# Patient Record
Sex: FEMALE | Race: WHITE | Employment: OTHER | URBAN - METROPOLITAN AREA
[De-identification: names, ages, dates, MRNs, and addresses within clinical notes are randomized per-mention and may not be internally consistent; named-entity substitution may affect disease eponyms.]

---

## 2020-08-26 ENCOUNTER — OFFICE VISIT (OUTPATIENT)
Dept: FAMILY MEDICINE CLINIC | Age: 25
End: 2020-08-26
Payer: COMMERCIAL

## 2020-08-26 VITALS
SYSTOLIC BLOOD PRESSURE: 104 MMHG | HEART RATE: 66 BPM | BODY MASS INDEX: 22.3 KG/M2 | DIASTOLIC BLOOD PRESSURE: 72 MMHG | HEIGHT: 64 IN | OXYGEN SATURATION: 99 % | WEIGHT: 130.6 LBS | TEMPERATURE: 98.3 F

## 2020-08-26 PROBLEM — F32.A ANXIETY AND DEPRESSION: Status: ACTIVE | Noted: 2020-08-26

## 2020-08-26 PROBLEM — N92.6 IRREGULAR MENSES: Status: ACTIVE | Noted: 2020-08-26

## 2020-08-26 PROBLEM — F41.9 ANXIETY AND DEPRESSION: Status: ACTIVE | Noted: 2020-08-26

## 2020-08-26 PROCEDURE — 99395 PREV VISIT EST AGE 18-39: CPT | Performed by: FAMILY MEDICINE

## 2020-08-26 PROCEDURE — 96160 PT-FOCUSED HLTH RISK ASSMT: CPT | Performed by: FAMILY MEDICINE

## 2020-08-26 RX ORDER — ESCITALOPRAM OXALATE 10 MG/1
10 TABLET ORAL DAILY
Qty: 30 TABLET | Refills: 5 | Status: SHIPPED | OUTPATIENT
Start: 2020-08-26 | End: 2021-01-14

## 2020-08-26 RX ORDER — ESCITALOPRAM OXALATE 5 MG/1
TABLET ORAL
COMMUNITY
Start: 2020-08-05 | End: 2020-08-26 | Stop reason: DRUGHIGH

## 2020-08-26 SDOH — SOCIAL STABILITY: SOCIAL NETWORK: HOW OFTEN DO YOU GET TOGETHER WITH FRIENDS OR RELATIVES?: TWICE A WEEK

## 2020-08-26 SDOH — SOCIAL STABILITY: SOCIAL NETWORK: HOW OFTEN DO YOU ATTEND CHURCH OR RELIGIOUS SERVICES?: NEVER

## 2020-08-26 SDOH — SOCIAL STABILITY: SOCIAL NETWORK: IN A TYPICAL WEEK, HOW MANY TIMES DO YOU TALK ON THE PHONE WITH FAMILY, FRIENDS, OR NEIGHBORS?: TWICE A WEEK

## 2020-08-26 SDOH — SOCIAL STABILITY: SOCIAL INSECURITY: WITHIN THE LAST YEAR, HAVE YOU BEEN AFRAID OF YOUR PARTNER OR EX-PARTNER?: NO

## 2020-08-26 SDOH — SOCIAL STABILITY: SOCIAL INSECURITY
WITHIN THE LAST YEAR, HAVE TO BEEN RAPED OR FORCED TO HAVE ANY KIND OF SEXUAL ACTIVITY BY YOUR PARTNER OR EX-PARTNER?: NO

## 2020-08-26 SDOH — SOCIAL STABILITY: SOCIAL INSECURITY: WITHIN THE LAST YEAR, HAVE YOU BEEN HUMILIATED OR EMOTIONALLY ABUSED IN OTHER WAYS BY YOUR PARTNER OR EX-PARTNER?: NO

## 2020-08-26 SDOH — HEALTH STABILITY: PHYSICAL HEALTH: ON AVERAGE, HOW MANY MINUTES DO YOU ENGAGE IN EXERCISE AT THIS LEVEL?: 60 MIN

## 2020-08-26 SDOH — SOCIAL STABILITY: SOCIAL NETWORK: HOW OFTEN DO YOU ATTENT MEETINGS OF THE CLUB OR ORGANIZATION YOU BELONG TO?: NEVER

## 2020-08-26 SDOH — SOCIAL STABILITY: SOCIAL NETWORK: ARE YOU MARRIED, WIDOWED, DIVORCED, SEPARATED, NEVER MARRIED, OR LIVING WITH A PARTNER?: MARRIED

## 2020-08-26 SDOH — HEALTH STABILITY: PHYSICAL HEALTH: ON AVERAGE, HOW MANY DAYS PER WEEK DO YOU ENGAGE IN MODERATE TO STRENUOUS EXERCISE (LIKE A BRISK WALK)?: 7 DAYS

## 2020-08-26 SDOH — SOCIAL STABILITY: SOCIAL INSECURITY
WITHIN THE LAST YEAR, HAVE YOU BEEN KICKED, HIT, SLAPPED, OR OTHERWISE PHYSICALLY HURT BY YOUR PARTNER OR EX-PARTNER?: NO

## 2020-08-26 SDOH — HEALTH STABILITY: MENTAL HEALTH
STRESS IS WHEN SOMEONE FEELS TENSE, NERVOUS, ANXIOUS, OR CAN'T SLEEP AT NIGHT BECAUSE THEIR MIND IS TROUBLED. HOW STRESSED ARE YOU?: RATHER MUCH

## 2020-08-26 SDOH — SOCIAL STABILITY: SOCIAL NETWORK
DO YOU BELONG TO ANY CLUBS OR ORGANIZATIONS SUCH AS CHURCH GROUPS UNIONS, FRATERNAL OR ATHLETIC GROUPS, OR SCHOOL GROUPS?: NO

## 2020-08-26 ASSESSMENT — PATIENT HEALTH QUESTIONNAIRE - PHQ9
4. FEELING TIRED OR HAVING LITTLE ENERGY: 2
2. FEELING DOWN, DEPRESSED OR HOPELESS: 2
3. TROUBLE FALLING OR STAYING ASLEEP: 2
9. THOUGHTS THAT YOU WOULD BE BETTER OFF DEAD, OR OF HURTING YOURSELF: 0
5. POOR APPETITE OR OVEREATING: 0
7. TROUBLE CONCENTRATING ON THINGS, SUCH AS READING THE NEWSPAPER OR WATCHING TELEVISION: 0
SUM OF ALL RESPONSES TO PHQ QUESTIONS 1-9: 8
1. LITTLE INTEREST OR PLEASURE IN DOING THINGS: 1
SUM OF ALL RESPONSES TO PHQ9 QUESTIONS 1 & 2: 3
6. FEELING BAD ABOUT YOURSELF - OR THAT YOU ARE A FAILURE OR HAVE LET YOURSELF OR YOUR FAMILY DOWN: 1
10. IF YOU CHECKED OFF ANY PROBLEMS, HOW DIFFICULT HAVE THESE PROBLEMS MADE IT FOR YOU TO DO YOUR WORK, TAKE CARE OF THINGS AT HOME, OR GET ALONG WITH OTHER PEOPLE: 2
SUM OF ALL RESPONSES TO PHQ QUESTIONS 1-9: 8

## 2020-08-26 ASSESSMENT — ANXIETY QUESTIONNAIRES
6. BECOMING EASILY ANNOYED OR IRRITABLE: 1-SEVERAL DAYS
2. NOT BEING ABLE TO STOP OR CONTROL WORRYING: 1-SEVERAL DAYS
7. FEELING AFRAID AS IF SOMETHING AWFUL MIGHT HAPPEN: 0-NOT AT ALL
1. FEELING NERVOUS, ANXIOUS, OR ON EDGE: 1-SEVERAL DAYS
3. WORRYING TOO MUCH ABOUT DIFFERENT THINGS: 1-SEVERAL DAYS
5. BEING SO RESTLESS THAT IT IS HARD TO SIT STILL: 0-NOT AT ALL
GAD7 TOTAL SCORE: 4
4. TROUBLE RELAXING: 0-NOT AT ALL

## 2020-08-26 ASSESSMENT — ENCOUNTER SYMPTOMS
EYE DISCHARGE: 0
EYE PAIN: 0
SORE THROAT: 0
CONSTIPATION: 0
DIARRHEA: 0
WHEEZING: 0
SHORTNESS OF BREATH: 0
BLOOD IN STOOL: 0
RHINORRHEA: 0
BACK PAIN: 1
ABDOMINAL PAIN: 0
COLOR CHANGE: 0

## 2020-08-26 NOTE — PATIENT INSTRUCTIONS
-increase lexapro to 10 mg daily  -see OBGYN in mean time  -see behavioral health at your own discretion and my recommendation  -remain active and recommend 150 minutes a week of physical activity, at least 1500 to 2000 calories a day higher fat/protein, lower carbohydrate    See me back in 6 weeks

## 2020-08-26 NOTE — PROGRESS NOTES
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 No Known Allergies    Social History     Socioeconomic History    Marital status:      Spouse name: Not on file    Number of children: Not on file    Years of education: Not on file    Highest education level: Not on file   Occupational History    Not on file   Social Needs    Financial resource strain: Not on file    Food insecurity     Worry: Not on file     Inability: Not on file    Transportation needs     Medical: Not on file     Non-medical: Not on file   Tobacco Use    Smoking status: Never Smoker    Smokeless tobacco: Never Used   Substance and Sexual Activity    Alcohol use:  Yes     Alcohol/week: 6.0 standard drinks     Types: 3 Glasses of wine, 3 Cans of beer per week    Drug use: Never    Sexual activity: Yes     Partners: Male   Lifestyle    Physical activity     Days per week: 7 days     Minutes per session: 60 min    Stress: Rather much Relationships    Social connections     Talks on phone: Twice a week     Gets together: Twice a week     Attends Adventism service: Never     Active member of club or organization: No     Attends meetings of clubs or organizations: Never     Relationship status:     Intimate partner violence     Fear of current or ex partner: No     Emotionally abused: No     Physically abused: No     Forced sexual activity: No   Other Topics Concern    Not on file   Social History Narrative    Not on file       There is no immunization history on file for this patient. Past medical, surgical, and social history reviewed and updated. Medications, immunizations, and allergies reviewed and updated     Review of Systems   Constitutional: Negative for chills, fever and unexpected weight change. HENT: Negative for congestion, rhinorrhea and sore throat. Eyes: Negative for pain, discharge and visual disturbance. Respiratory: Negative for shortness of breath and wheezing. Cardiovascular: Negative for chest pain and leg swelling. Gastrointestinal: Negative for abdominal pain, blood in stool, constipation and diarrhea. Endocrine: Negative for polyuria. Genitourinary: Negative for difficulty urinating, dysuria, flank pain, menstrual problem, pelvic pain, vaginal discharge and vaginal pain. Musculoskeletal: Positive for back pain. Negative for arthralgias and neck pain. Skin: Negative for color change, rash and wound. Allergic/Immunologic: Negative for environmental allergies, food allergies and immunocompromised state. Neurological: Positive for headaches. Negative for dizziness, speech difficulty, weakness and light-headedness. Hematological: Negative for adenopathy. Does not bruise/bleed easily. Psychiatric/Behavioral: Positive for dysphoric mood and sleep disturbance (trouble falling asleep; needs TV to fall asleep). Negative for suicidal ideas. The patient is nervous/anxious. OBJECTIVE:  /72 (Site: Right Upper Arm, Position: Sitting)   Pulse 66   Temp 98.3 °F (36.8 °C)   Ht 5' 3.9\" (1.623 m)   Wt 130 lb 9.6 oz (59.2 kg)   SpO2 99%   BMI 22.49 kg/m²     Physical Exam  Constitutional:       General: She is not in acute distress. Appearance: She is well-developed. HENT:      Head: Normocephalic and atraumatic. Right Ear: Tympanic membrane normal.      Left Ear: Tympanic membrane normal.      Nose: Nose normal. No rhinorrhea. Mouth/Throat:      Pharynx: Uvula midline. Eyes:      Pupils: Pupils are equal, round, and reactive to light. Neck:      Trachea: No tracheal deviation. Cardiovascular:      Rate and Rhythm: Normal rate and regular rhythm. Heart sounds: Normal heart sounds. No murmur. No friction rub. No gallop. Pulmonary:      Effort: Pulmonary effort is normal. No respiratory distress. Breath sounds: Normal breath sounds. No wheezing or rales. Abdominal:      General: Bowel sounds are normal. There is no distension. Palpations: Abdomen is soft. Tenderness: There is no abdominal tenderness. There is no rebound. Musculoskeletal: Normal range of motion. General: No tenderness. Lymphadenopathy:      Cervical: No cervical adenopathy. Skin:     General: Skin is warm and dry. Findings: No erythema or rash. Comments: Dry skin/chapping above upper lip   Neurological:      Mental Status: She is alert and oriented to person, place, and time. Cranial Nerves: No cranial nerve deficit. Deep Tendon Reflexes:      Reflex Scores:       Tricep reflexes are 2+ on the right side and 2+ on the left side. Bicep reflexes are 2+ on the right side and 2+ on the left side. Brachioradialis reflexes are 2+ on the right side and 2+ on the left side. Patellar reflexes are 2+ on the right side and 2+ on the left side. Psychiatric:         Mood and Affect: Mood is anxious and depressed.  Affect is tearful. Speech: Speech normal.         Behavior: Behavior normal.         Thought Content: Thought content does not include homicidal or suicidal ideation. Judgment: Judgment normal.       ASSESSMENT/PLAN:  Vera Cabot is 32 y/o female here for establish care/annual physical.  Had chapped upper lips, anxiety/depression, concerns about fertility, need for preconception counseling/fertility work up. Referred to behavioral health for mood, increased lexapro to 10 mg daily. Referred to OBGYN for womne's health. Hx of migraines, no Rx indicated currently for prophylaxis/ of headaches. Had HPV series, Tdap in college. Pap smear in past year or two in [place]. Will get records from previous PCP/OBGYN. Follow up in 6 weeks for depression and anxiety. 1. Encounter to establish care  2. Annual physical exam  -Chart/records reviewed, history and physical performed, health maintenance addressed and updated, presenting problems addressed. 3. Anxiety and depression  PHQ-9 Total Score: 8 (2020 11:43 AM)  Thoughts that you would be better off dead, or of hurting yourself in some way: 0 (2020 11:43 AM)    DIONI 7 SCORE 2020   DIONI-7 Total Score 4     - escitalopram (LEXAPRO) 10 MG tablet; Take 1 tablet by mouth daily  Dispense: 30 tablet; Refill: 5  - External Referral To Psychology    4. Pre-conception counseling  5. Cervical cancer screening  6. Irregular menses  - [facility], DO, Obstetrics, [place]      Reviewed treatment plan with patient. Patient verbalized understanding to treatment plan and questions were answered. Return in about 6 weeks (around 10/7/2020). Marilyn Karimi.      2020

## 2020-09-30 ENCOUNTER — OFFICE VISIT (OUTPATIENT)
Dept: ORTHOPEDIC SURGERY | Age: 25
End: 2020-09-30
Payer: COMMERCIAL

## 2020-09-30 ENCOUNTER — TELEPHONE (OUTPATIENT)
Dept: ORTHOPEDIC SURGERY | Age: 25
End: 2020-09-30

## 2020-09-30 VITALS — WEIGHT: 130.51 LBS | HEIGHT: 64 IN | BODY MASS INDEX: 22.28 KG/M2

## 2020-09-30 PROCEDURE — L4361 PNEUMA/VAC WALK BOOT PRE OTS: HCPCS | Performed by: ORTHOPAEDIC SURGERY

## 2020-09-30 PROCEDURE — 99203 OFFICE O/P NEW LOW 30 MIN: CPT | Performed by: ORTHOPAEDIC SURGERY

## 2020-09-30 NOTE — PROGRESS NOTES
deformity or injury. Range of motion is unremarkable. There is no gross instability. There are no rashes, ulcerations or lesions. Strength and tone are normal.    Radiology:     X-rays obtained and reviewed in office:  Views 3  Location right ankle  Impression shows no evidence of obvious fracture mortise is well reduced. Assessment : Right anterior lateral ankle pain ATFL injury    Impression:  No diagnosis found. Office Procedures:  No orders of the defined types were placed in this encounter. Treatment Plan:  The etiology of anterior lateral ankle sprain and it's appropriate treatment were discussed in great detail. I wrote out her plan of care and copied to the media section of her chart.   I put her in a high tide boot Ace bandage she can wean off the crutches over the next 2 weeks follow-up with me in 2 weeks repeat x-rays of the ankle and hopefully at that point I can get her into a brace

## 2020-10-07 ENCOUNTER — OFFICE VISIT (OUTPATIENT)
Dept: FAMILY MEDICINE CLINIC | Age: 25
End: 2020-10-07
Payer: COMMERCIAL

## 2020-10-07 VITALS
WEIGHT: 134.2 LBS | SYSTOLIC BLOOD PRESSURE: 98 MMHG | BODY MASS INDEX: 22.91 KG/M2 | TEMPERATURE: 98.3 F | OXYGEN SATURATION: 98 % | HEART RATE: 73 BPM | DIASTOLIC BLOOD PRESSURE: 62 MMHG | HEIGHT: 64 IN

## 2020-10-07 PROCEDURE — 99213 OFFICE O/P EST LOW 20 MIN: CPT | Performed by: FAMILY MEDICINE

## 2020-10-07 RX ORDER — ESCITALOPRAM OXALATE 10 MG/1
10 TABLET ORAL DAILY
COMMUNITY
Start: 2020-08-26 | End: 2020-10-07 | Stop reason: SDUPTHER

## 2020-10-07 SDOH — ECONOMIC STABILITY: FOOD INSECURITY: WITHIN THE PAST 12 MONTHS, YOU WORRIED THAT YOUR FOOD WOULD RUN OUT BEFORE YOU GOT MONEY TO BUY MORE.: NEVER TRUE

## 2020-10-07 SDOH — ECONOMIC STABILITY: TRANSPORTATION INSECURITY
IN THE PAST 12 MONTHS, HAS THE LACK OF TRANSPORTATION KEPT YOU FROM MEDICAL APPOINTMENTS OR FROM GETTING MEDICATIONS?: NO

## 2020-10-07 SDOH — ECONOMIC STABILITY: INCOME INSECURITY: HOW HARD IS IT FOR YOU TO PAY FOR THE VERY BASICS LIKE FOOD, HOUSING, MEDICAL CARE, AND HEATING?: NOT HARD AT ALL

## 2020-10-07 SDOH — ECONOMIC STABILITY: TRANSPORTATION INSECURITY
IN THE PAST 12 MONTHS, HAS LACK OF TRANSPORTATION KEPT YOU FROM MEETINGS, WORK, OR FROM GETTING THINGS NEEDED FOR DAILY LIVING?: NO

## 2020-10-07 SDOH — ECONOMIC STABILITY: FOOD INSECURITY: WITHIN THE PAST 12 MONTHS, THE FOOD YOU BOUGHT JUST DIDN'T LAST AND YOU DIDN'T HAVE MONEY TO GET MORE.: NEVER TRUE

## 2020-10-07 ASSESSMENT — PATIENT HEALTH QUESTIONNAIRE - PHQ9
SUM OF ALL RESPONSES TO PHQ QUESTIONS 1-9: 7
9. THOUGHTS THAT YOU WOULD BE BETTER OFF DEAD, OR OF HURTING YOURSELF: 0
6. FEELING BAD ABOUT YOURSELF - OR THAT YOU ARE A FAILURE OR HAVE LET YOURSELF OR YOUR FAMILY DOWN: 1
10. IF YOU CHECKED OFF ANY PROBLEMS, HOW DIFFICULT HAVE THESE PROBLEMS MADE IT FOR YOU TO DO YOUR WORK, TAKE CARE OF THINGS AT HOME, OR GET ALONG WITH OTHER PEOPLE: 0
4. FEELING TIRED OR HAVING LITTLE ENERGY: 1
SUM OF ALL RESPONSES TO PHQ9 QUESTIONS 1 & 2: 2
8. MOVING OR SPEAKING SO SLOWLY THAT OTHER PEOPLE COULD HAVE NOTICED. OR THE OPPOSITE, BEING SO FIGETY OR RESTLESS THAT YOU HAVE BEEN MOVING AROUND A LOT MORE THAN USUAL: 0
5. POOR APPETITE OR OVEREATING: 0
SUM OF ALL RESPONSES TO PHQ QUESTIONS 1-9: 7
2. FEELING DOWN, DEPRESSED OR HOPELESS: 1
1. LITTLE INTEREST OR PLEASURE IN DOING THINGS: 1
3. TROUBLE FALLING OR STAYING ASLEEP: 2
7. TROUBLE CONCENTRATING ON THINGS, SUCH AS READING THE NEWSPAPER OR WATCHING TELEVISION: 1

## 2020-10-07 ASSESSMENT — ANXIETY QUESTIONNAIRES
2. NOT BEING ABLE TO STOP OR CONTROL WORRYING: 0-NOT AT ALL
GAD7 TOTAL SCORE: 3
5. BEING SO RESTLESS THAT IT IS HARD TO SIT STILL: 0-NOT AT ALL
6. BECOMING EASILY ANNOYED OR IRRITABLE: 1-SEVERAL DAYS
1. FEELING NERVOUS, ANXIOUS, OR ON EDGE: 0-NOT AT ALL
4. TROUBLE RELAXING: 1-SEVERAL DAYS
7. FEELING AFRAID AS IF SOMETHING AWFUL MIGHT HAPPEN: 0-NOT AT ALL
3. WORRYING TOO MUCH ABOUT DIFFERENT THINGS: 1-SEVERAL DAYS

## 2020-10-07 ASSESSMENT — ENCOUNTER SYMPTOMS
SORE THROAT: 0
BLOOD IN STOOL: 0
ABDOMINAL PAIN: 0
DIARRHEA: 0
SHORTNESS OF BREATH: 0
TROUBLE SWALLOWING: 0
CONSTIPATION: 0
RHINORRHEA: 0

## 2020-10-07 NOTE — PROGRESS NOTES
SUBJECTIVE:  Chief Complaint   Patient presents with    Anxiety     pt states that the increase dose of her Lexapro has helped some.  Depression    Ear Fullness     pt states that she was seen in an urgent care for an ear infection, finished antibiotics but still has muffled hearing.  Ankle Injury    Ankle Pain     HPI     Lamonte Barrett is a 31 y. o.female that presents today for 6 week follow up for mood, ankle sprain, ear pain/hearing change:    -Stress/anxiety/depression:  -hast not sought out but will consider behavioral health  -increased lexapro from 5 to 10 mg daily last OV  -still having times of tearfulness or feels like doesn't want to talk  -overall feeling some better per patient  PHQ-9 Total Score: 7 (10/7/2020  8:49 AM)  Thoughts that you would be better off dead, or of hurting yourself in some way: 0 (10/7/2020  8:49 AM)    DIONI 7 SCORE 10/7/2020 8/26/2020   DIONI-7 Total Score 3 4     -Right ankle sprain:  Sprained right ankle 9/24/2020  Saw Dr. Sejal Maurer on 9/30/2020  Aircast boot for a weeks  Still very bruised, not nearly as swollen  Pain with getting in and out of car and on stairs  Okay as long as she is in the boot.    -Hearing change/right ear pain:  About 4 weeks ago had right ear pain  Got worse at first, mild pain, discharge and hearing loss  Treated for otitis externa  With cortisporin drops and Augmentin pills  No longer in pain but having muffled hearing and some fullness    Past Medical History:   Diagnosis Date    Anxiety     Depression     Headache        Past Surgical History:   Procedure Laterality Date    WISDOM TOOTH EXTRACTION         Family History   Problem Relation Age of Onset    No Known Problems Mother     Kidney stones Father     Collagen Disease Father         diverticulitis       Current Outpatient Medications   Medication Sig Dispense Refill    escitalopram (LEXAPRO) 10 MG tablet Take 1 tablet by mouth daily 30 tablet 5     No current facility-administered medications for this visit. No Known Allergies    Social History     Socioeconomic History    Marital status:      Spouse name: Deirdre Every Number of children: Not on file    Years of education: Not on file    Highest education level: Not on file   Occupational History    Not on file   Social Needs    Financial resource strain: Not hard at all   [place] insecurity     Worry: Never true     Inability: Never true   [employer] needs     Medical: No     Non-medical: No   Tobacco Use    Smoking status: Never Smoker    Smokeless tobacco: Never Used   Substance and Sexual Activity    Alcohol use: Yes     Alcohol/week: 6.0 standard drinks     Types: 3 Glasses of wine, 3 Cans of beer per week    Drug use: Never    Sexual activity: Yes     Partners: Male   Lifestyle    Physical activity     Days per week: 7 days     Minutes per session: 60 min    Stress: Rather much   Relationships    Social connections     Talks on phone: Twice a week     Gets together: Twice a week     Attends Spiritism service: Never     Active member of club or organization: No     Attends meetings of clubs or organizations: Never     Relationship status:     Intimate partner violence     Fear of current or ex partner: No     Emotionally abused: No     Physically abused: No     Forced sexual activity: No   Other Topics Concern    Not on file   Social History Narrative    Not on file       There is no immunization history on file for this patient. Past medical, surgical, and social history reviewed and updated. Medications, immunizations, and allergies reviewed and updated     Review of Systems   Constitutional: Negative for chills, fever and unexpected weight change. HENT: Positive for hearing loss (right hearing change). Negative for congestion, ear discharge, ear pain, rhinorrhea, sore throat and trouble swallowing. Eyes: Negative for visual disturbance. Respiratory: Negative for shortness of breath. Cardiovascular: Negative for chest pain. Gastrointestinal: Negative for abdominal pain, blood in stool, constipation and diarrhea. Endocrine: Negative for polyuria. Genitourinary: Negative for dysuria and hematuria. Musculoskeletal: Positive for arthralgias and joint swelling (right ankle). Skin: Negative for rash. Allergic/Immunologic: Negative for environmental allergies and food allergies. Neurological: Negative for weakness, numbness and headaches. Psychiatric/Behavioral: Positive for dysphoric mood and sleep disturbance. The patient is nervous/anxious. OBJECTIVE:  BP 98/62   Pulse 73   Temp 98.3 °F (36.8 °C) (Temporal)   Ht 5' 3.9\" (1.623 m)   Wt 134 lb 3.2 oz (60.9 kg)   LMP 09/18/2020   SpO2 98%   BMI 23.11 kg/m²     Physical Exam  Constitutional:       General: She is not in acute distress. Appearance: She is well-developed. HENT:      Head: Normocephalic and atraumatic. Right Ear: A middle ear effusion is present. There is impacted cerumen. Tympanic membrane is erythematous. Left Ear: Tympanic membrane normal.      Ears:      [place]ona exam findings: lateralizes right. Right Rinne: AC > BC. Left Rinne: AC > BC. Nose: Nose normal. No rhinorrhea. Mouth/Throat:      Pharynx: Uvula midline. Eyes:      Pupils: Pupils are equal, round, and reactive to light. Neck:      Trachea: No tracheal deviation. Cardiovascular:      Rate and Rhythm: Normal rate and regular rhythm. Heart sounds: Normal heart sounds. No murmur. No friction rub. No gallop. Pulmonary:      Effort: Pulmonary effort is normal. No respiratory distress. Breath sounds: Normal breath sounds. No wheezing or rales. Abdominal:      General: Bowel sounds are normal. There is no distension. Palpations: Abdomen is soft. Tenderness: There is no abdominal tenderness. There is no rebound. Musculoskeletal: Normal range of motion. General: No tenderness. Lymphadenopathy:      Cervical: No cervical adenopathy. Skin:     General: Skin is warm and dry. Findings: No erythema or rash. Neurological:      Mental Status: She is alert and oriented to person, place, and time. Cranial Nerves: No cranial nerve deficit. Psychiatric:         Speech: Speech normal.         Thought Content: Thought content does not include homicidal or suicidal ideation. ASSESSMENT/PLAN:  Harris Rossi is 79-year-old female seen today for anxiety and depression follow-up, right ear pain and hearing change, and right ankle sprain. Mood somewhat improved but still about the same on Lexapro 10 mg which we increased from 5 mg 6 weeks ago. Patient is planning to reach out  with behavioral health in the future but has not sought out care yet. Right ankle sprain in the last few weeks and followed up with orthopedics and has a follow-up in 1 week. Currently in a right sided pneumo boot/Aircast.  Patient also with otitis externa and treated with oral and topical antibiotics at an urgent care, still having some muffling of the right ear, some tenderness to palpation when looking in the ear today and inner ear redness and cerumen. Encourage patient to do an additional 7 days of Cortisporin drops and if no improvement we will refer to ENT for more formal testing. Barahona test lateralized to the affected ear today. 1. Anxiety and depression  PHQ-9 Total Score: 7 (10/7/2020  8:49 AM)  Thoughts that you would be better off dead, or of hurting yourself in some way: 0 (10/7/2020  8:49 AM)    DIONI 7 SCORE 10/7/2020 8/26/2020   DIONI-7 Total Score 3 4     -will consider seeing behavioral health  -continue lexapro 10 mg daily    2. Other infective acute otitis externa of right ear  -restart cortisporin drops for 7 more days  -refer for audiometry/ENT assessment if persists/fails to improve    3.  Sprain of right ankle, unspecified ligament, sequela  -continue air cast, follow up with  [facility] as planned in 1 week    Reviewed treatment plan with patient. Patient verbalized understanding to treatment plan and questions were answered. Return if symptoms worsen or fail to improve. Paris Ramires.      10/7/2020

## 2020-10-07 NOTE — PATIENT INSTRUCTIONS
Use the cortisporin drops for an additional 7 days  Let me know if not improving or still muffled and will have you see the ENT  Continue Lexapro at 10 mg daily  See behavioral health  Follow up with Dr. Courtney Kaur  See gynecology/fertility at your convenience

## 2020-10-21 NOTE — PROGRESS NOTES
Subjective: Patient is here for follow-up of right ankle pain. She states that she has been out of the boot a little bit and her pain is gone down significantly since last time I saw her on 9/30/2020. 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 She has been using her boot on a regular basis and is significantly better  Objective: Physical exam shows minimal effusion in the right ankle. Anterior drawer and talar tilt show no gross laxity sensations intact 10 degrees dorsiflexion 50 degrees of plantarflexion anterior drawer and talar tilt show minimal laxity patient is tender over the ATFL not over the peroneals  Imaging: 3 views of the right ankle show some lucency on the AP and the either anterior lateral or posterior lateral tibia. Otherwise mortise is well reduced  Assessment and plan: Patient is doing significantly better. We talked about using a brace versus a work boot she will gradually increase her activities follow-up with me in 4 weeks as needed.   If she does follow-up repeat x-rays of the ankle should be obtained

## 2020-10-22 ENCOUNTER — OFFICE VISIT (OUTPATIENT)
Dept: ORTHOPEDIC SURGERY | Age: 25
End: 2020-10-22
Payer: COMMERCIAL

## 2020-10-22 VITALS — BODY MASS INDEX: 23.74 KG/M2 | WEIGHT: 134 LBS | HEIGHT: 63 IN

## 2020-10-22 PROCEDURE — 99214 OFFICE O/P EST MOD 30 MIN: CPT | Performed by: ORTHOPAEDIC SURGERY

## 2021-01-12 ENCOUNTER — OFFICE VISIT (OUTPATIENT)
Dept: PRIMARY CARE CLINIC | Age: 26
End: 2021-01-12
Payer: COMMERCIAL

## 2021-01-12 VITALS
HEART RATE: 84 BPM | HEIGHT: 63 IN | WEIGHT: 137.8 LBS | BODY MASS INDEX: 24.41 KG/M2 | DIASTOLIC BLOOD PRESSURE: 84 MMHG | OXYGEN SATURATION: 98 % | SYSTOLIC BLOOD PRESSURE: 118 MMHG | TEMPERATURE: 97.1 F

## 2021-01-12 DIAGNOSIS — F41.9 ANXIETY AND DEPRESSION: Primary | ICD-10-CM

## 2021-01-12 DIAGNOSIS — F32.A ANXIETY AND DEPRESSION: Primary | ICD-10-CM

## 2021-01-12 PROCEDURE — 99203 OFFICE O/P NEW LOW 30 MIN: CPT | Performed by: FAMILY MEDICINE

## 2021-01-12 ASSESSMENT — ENCOUNTER SYMPTOMS
CHEST TIGHTNESS: 0
SINUS PRESSURE: 0
SHORTNESS OF BREATH: 0
VOMITING: 0
SORE THROAT: 0
COUGH: 0
NAUSEA: 0
BLOOD IN STOOL: 0
SINUS PAIN: 0
ABDOMINAL PAIN: 0
WHEEZING: 0
DIARRHEA: 0

## 2021-01-12 NOTE — PROGRESS NOTES
Subjective  Nely Tavares, 31 y.o. female presents today with:  Chief Complaint   Patient presents with    Medication Check         Past Medical History:   Diagnosis Date    Anxiety     Depression     Headache      Past Surgical History:   Procedure Laterality Date    WISDOM TOOTH EXTRACTION       Social History     Socioeconomic History    Marital status:      Spouse name: Richard Elliott Number of children: Not on file    Years of education: Not on file    Highest education level: Not on file   Occupational History    Not on file   Social Needs    Financial resource strain: Not hard at all   [employer] insecurity     Worry: Never true     Inability: Never true   [employer] needs     Medical: No     Non-medical: No   Tobacco Use    Smoking status: Never Smoker    Smokeless tobacco: Never Used   Substance and Sexual Activity    Alcohol use:  Yes     Alcohol/week: 6.0 standard drinks     Types: 3 Glasses of wine, 3 Cans of beer per week    Drug use: Never    Sexual activity: Yes     Partners: Male   Lifestyle    Physical activity     Days per week: 7 days     Minutes per session: 60 min    Stress: Rather much   Relationships    Social connections     Talks on phone: Twice a week     Gets together: Twice a week     Attends Anglican service: Never     Active member of club or organization: No     Attends meetings of clubs or organizations: Never     Relationship status:     Intimate partner violence     Fear of current or ex partner: No     Emotionally abused: No     Physically abused: No     Forced sexual activity: No   Other Topics Concern    Not on file   Social History Narrative    Not on file     Family History   Problem Relation Age of Onset    No Known Problems Mother     Kidney stones Father     Collagen Disease Father         diverticulitis     No Known Allergies  Current Outpatient Medications   Medication Sig Dispense Refill    escitalopram (LEXAPRO) 10 MG tablet Take 1 tablet by mouth daily 30 tablet 5     No current facility-administered medications for this visit. PMH, Surgical Hx, Family Hx, and Social Hx reviewed and updated. Health Maintenance reviewed. Mood Disorders Screen:  Risk factors: none  Symptoms:  endorses none, denies {[id number]  No data recorded       Safety Assessment:  Functional ability/ADLs:  Difficulty with bathing- no, grooming- no, meals- no, incontinence- no.  Driving- no. Home safety: Lives with family . Number of stairs to enter home: 0, within home: 24.  Risk factors for falls: no.  Home environment modifications:  no.   Exercise: walking  Seatbelt use: yes      Cognitive Screening:  Clock drawing test score: 0/5. Mini-mental status exam score: not completed because not due yet. Last eye exam: na, normal    End of Life Planning:  Advanced Directive: has NO advanced directive - not interested in additional information. Objective      Reviewed with the patient: current clinical status,medications, activities and diet.

## 2021-01-12 NOTE — PATIENT INSTRUCTIONS
Patient Education        Depression Treatment: Care Instructions  Your Care Instructions     Depression is a condition that affects the way you feel, think, and act. It causes symptoms such as low energy, loss of interest in daily activities, and sadness or grouchiness that goes on for a long time. Depression is very common and affects men and women of all ages. Depression is a medical illness caused by changes in the natural chemicals in your brain. It is not a character flaw, and it does not mean that you are a bad or weak person. It does not mean that you are going crazy. It is important to know that depression can be treated. Medicines, counseling, and self-care can all help. Many people do not get help because they are embarrassed or think that they will get over the depression on their own. But some people do not get better without treatment. Follow-up care is a key part of your treatment and safety. Be sure to make and go to all appointments, and call your doctor if you are having problems. It's also a good idea to know your test results and keep a list of the medicines you take. How can you care for yourself at home? Learn about antidepressant medicines  Antidepressant medicines can improve or end the symptoms of depression. You may need to take the medicine for at least 6 months, and often longer. Keep taking your medicine even if you feel better. If you stop taking it too soon, your symptoms may come back or get worse. You may start to feel better within 1 to 3 weeks of taking antidepressant medicine. But it can take as many as 6 to 8 weeks to see more improvement. Talk to your doctor if you have problems with your medicine or if you do not notice any improvement after 3 weeks. Antidepressants can make you feel tired, dizzy, or nervous. Some people have dry mouth, constipation, headaches, sexual problems, an upset stomach, or diarrhea.  Many of these side effects are mild and go away on their own after you take the medicine for a few weeks. Some may last longer. Talk to your doctor if side effects bother you too much. You might be able to try a different medicine. If you are pregnant or breastfeeding, talk to your doctor about what medicines you can take. Learn about counseling  In many cases, counseling can work as well as medicines to treat mild to moderate depression. Counseling is done by licensed mental health providers, such as psychologists, social workers, and some types of nurses. It can be done in one-on-one sessions or in a group setting. Many people find group sessions helpful. Cognitive-behavioral therapy is a type of counseling. In this treatment therapy, you learn how to see and change unhelpful thinking styles that may be adding to your depression. Counseling and medicines often work well when used together. Here are other things you could try to help with depression:  · Get regular exercise. It may help you feel better. · Plan something pleasant for yourself every day. Include activities that you have enjoyed in the past.  · Get enough sleep. Talk to your doctor if you have problems sleeping. · Eat a balanced diet. If you do not feel hungry, eat small snacks rather than large meals. · Avoid using illegal drugs or marijuana and drinking alcohol. Do not take medicines that have not been prescribed for you. They may interfere with your treatment, or they may make your depression worse. · Spend time with family and friends. It may help to speak openly about your depression with people you trust.  · Take your medicines exactly as prescribed. Call your doctor if you think you are having a problem with your medicine. · Do not make major life decisions while you are depressed. Depression may change the way you think. You will be able to make better decisions after you feel better. · Think positively. Challenge negative thoughts with statements such as \"I am hopeful\";  \"Things will get better\"; and \"I can ask for the help I need. \" Write down these statements and read them often, even if you don't believe them yet. · Be patient with yourself. It took time for your depression to develop, and it will take time for your symptoms to improve. Do not take on too much or be too hard on yourself. · Learn all you can about depression from written and online materials. · Check out behavioral health classes to learn more about dealing with depression. · If you or someone you know talks about suicide, self-harm, or feeling hopeless, get help right away. Call the [address] [place] [address] at 3-436-617-CXZL ([phone]) or text HOME to 266705 to access the [employer] Text Line. Consider saving these numbers in your phone. When should you call for help? Call 911 anytime you think you may need emergency care. For example, call if:    · You feel you cannot stop from hurting yourself or someone else. Call your doctor now or seek immediate medical care if:    · You hear voices.     · You feel much more depressed. Watch closely for changes in your health, and be sure to contact your doctor if:    · You are having problems with your depression medicine.     · You are not getting better as expected. Where can you learn more? Go to [email or web address] org and sign in to your Ch[employer] account. Enter G909 in the [employer] box to learn more about \"Depression Treatment: Care Instructions. \"     If you do not have an account, please click on the \"Sign Up Now\" link. Current as of: January 31, 2020               Content Version: 12.6  © 1563-5239 [employer]. Care instructions adapted under license by [facility] [facility] If you have questions about a medical condition or this instruction, always ask your healthcare professional. [address] any warranty or liability for your use of this information.

## 2021-01-12 NOTE — PROGRESS NOTES
Richelle Xiong  1995    No Known Allergies  Current Outpatient Medications   Medication Sig Dispense Refill    escitalopram (LEXAPRO) 10 MG tablet Take 1 tablet by mouth daily 30 tablet 5     No current facility-administered medications for this visit. Consultants:   Patient Care Team:  Nhung Fontanez. Doneta Boxer., DO as PCP - General (Family Medicine)  Nhung Fontanez. Doneta Boxer., DO as PCP - [facility] Radha Schmidt Provider    Chief Complaint:     Richelle Xiong is a 31 y.o. female  who presents for New Patient with Personalized Prevention Plan Services. 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 Patient states that she has been on Lexapro 10 mg daily for many years, she explains that on [place] [employer] she decided to go ahead and stop the medication because she did not feel that it was helping. She states that she was having a hard time functioning on the medication felt like her depression was worse in her anxiety and understands that she did the wrong thing by just stopping the medication without consulting her medical physician but states that she was going through some withdrawal such as tingling randomly throughout her body and irritability. This has been over a month now and states that most of these symptoms have resolved. States that she is not wanting to go back on medication at this point. Denies any SI or HI. She is wondering what options are at this point. Patient denies any other acute complaints or concerns.         Patient Active Problem List   Diagnosis    Anxiety and depression    Irregular menses           Health Maintenance Completed:  Health Maintenance   Topic Date Due    Hepatitis C screen  1995    HPV vaccine (1 - 2-dose series) 04/19/2005    HIV screen 04/19/2009    DTaP/Tdap/Td vaccine (1 - Tdap) 04/19/2013    Cervical cancer screen  04/19/2015    Flu vaccine (1) 09/01/2020    Hepatitis A vaccine  Aged Out    Hepatitis B vaccine  Aged Out    Hib vaccine  Aged Out    Meningococcal (ACWY) vaccine  Aged Out    Pneumococcal 0-64 years Vaccine  Aged Out    Varicella vaccine  Discontinued            There is no immunization history on file for this patient. Review of Systems:    Review of Systems   Constitutional: Negative for chills, diaphoresis, fatigue, fever and unexpected weight change. HENT: Negative for congestion, ear pain, sinus pressure, sinus pain and sore throat. Eyes: Negative for visual disturbance. Respiratory: Negative for cough, chest tightness, shortness of breath and wheezing. Cardiovascular: Negative for chest pain and palpitations. Gastrointestinal: Negative for abdominal pain, blood in stool, diarrhea, nausea and vomiting. Endocrine: Negative for cold intolerance and heat intolerance. Genitourinary: Negative for dysuria and hematuria. Musculoskeletal: Negative for arthralgias, gait problem and myalgias. Skin: Negative for pallor and rash. Neurological: Negative for dizziness and headaches. Hematological: Negative for adenopathy. Does not bruise/bleed easily. Psychiatric/Behavioral: Positive for agitation. Negative for behavioral problems, confusion and suicidal ideas. The patient is not nervous/anxious. Physical Exam:     Vitals:    01/12/21 0831   BP: 118/84   Pulse: 84   Temp: 97.1 °F (36.2 °C)   TempSrc: Temporal   SpO2: 98%   Weight: 137 lb 12.8 oz (62.5 kg)   Height: 5' 3\" (1.6 m)     Body mass index is 24.41 kg/m². BP Readings from Last 3 Encounters:   01/12/21 118/84   10/07/20 98/62   08/26/20 104/72          Physical Exam  Constitutional:       Appearance: Normal appearance. HENT:      Head: Normocephalic and atraumatic.       Mouth/Throat:      Mouth: Mucous membranes are moist. Pharynx: Oropharynx is clear. No oropharyngeal exudate or posterior oropharyngeal erythema. Eyes:      Extraocular Movements: Extraocular movements intact. Pupils: Pupils are equal, round, and reactive to light. Neck:      Musculoskeletal: Normal range of motion and neck supple. Cardiovascular:      Rate and Rhythm: Normal rate and regular rhythm. Pulses: Normal pulses. Heart sounds: Normal heart sounds. Pulmonary:      Effort: Pulmonary effort is normal.      Breath sounds: Normal breath sounds. Abdominal:      General: Bowel sounds are normal.      Palpations: Abdomen is soft. Tenderness: There is no abdominal tenderness. There is no guarding. Musculoskeletal: Normal range of motion. General: No swelling. Skin:     General: Skin is warm and dry. Capillary Refill: Capillary refill takes less than 2 seconds. Neurological:      General: No focal deficit present. Mental Status: She is alert and oriented to person, place, and time. Psychiatric:         Mood and Affect: Mood normal.         Behavior: Behavior normal.            Assessment/Plan:  Odalys was seen today for medication check. 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 Longstanding history. Previously on Lexapro 10 mg daily times many years, but stopped on [place] [employer] 2020.   Counseled patient on the importance of acutely withdrawing SSRI therapy. Patient was apologetic and appreciative. Counseled on treatment options including medical management and CBT. Patient elects to proceed with CBT at this point. Referral completed for behavioral medicine, Dr. Jp Moser. If patient wishes to consider medical management we will have this discussion and I am happy to initiate therapy as indicated. Health Maintenance:  (F) Breast Cancer Screen:   (F) Cervical Cancer Screen: Followed by [facility]  CRC/Colonoscopy Screening:   Lung Ca Screening: Annual LDCT (+smoker age 49-80, smoked within 15 years, total of 20 pack yr history):  DEXA Screen:  HIV Screen: (16-65 yr old, and all pregnant patients): Hep C Screen: (18-79 yr old):  [facility] [address] Screen:  (all pts with cirrhosis and high risk Hep B (US q6 mo)):    Return in about 3 months (around 4/12/2021) for Annual and FU on Dep/Anxiety. 3 months   - for annual, does not need PAP, has completed with [address] on Dep/Anxiety, consider restarting meds (previously on Lexapro but didn't feel that worked  - FU on Dr. Eder [facility]      MA Note Attestation:  I have reviewed the chief complaint and history of present illness (including ROS and [address]) and vital documentation by my staff and I agree with their documentation and have added where applicable. EMR Dragon/transcription disclaimer:  Much of this encounter note is electronic transcription/translation of spoken language to printed texts. The electronic translation of spoken language may be erroneous, or at times, nonsensical words or phrases may be inadvertently transcribed.   Although I have reviewed the note for such errors, some may still exist.

## 2021-01-13 ENCOUNTER — OFFICE VISIT (OUTPATIENT)
Dept: PRIMARY CARE CLINIC | Age: 26
End: 2021-01-13
Payer: COMMERCIAL

## 2021-01-13 DIAGNOSIS — F41.1 GENERALIZED ANXIETY DISORDER: ICD-10-CM

## 2021-01-13 DIAGNOSIS — F33.2 SEVERE EPISODE OF RECURRENT MAJOR DEPRESSIVE DISORDER, WITHOUT PSYCHOTIC FEATURES (HCC): Primary | ICD-10-CM

## 2021-01-13 PROCEDURE — 90791 PSYCH DIAGNOSTIC EVALUATION: CPT | Performed by: COUNSELOR

## 2021-01-13 ASSESSMENT — PATIENT HEALTH QUESTIONNAIRE - PHQ9
5. POOR APPETITE OR OVEREATING: 3
SUM OF ALL RESPONSES TO PHQ QUESTIONS 1-9: 24
2. FEELING DOWN, DEPRESSED OR HOPELESS: 3
4. FEELING TIRED OR HAVING LITTLE ENERGY: 3
6. FEELING BAD ABOUT YOURSELF - OR THAT YOU ARE A FAILURE OR HAVE LET YOURSELF OR YOUR FAMILY DOWN: 3
3. TROUBLE FALLING OR STAYING ASLEEP: 3
9. THOUGHTS THAT YOU WOULD BE BETTER OFF DEAD, OR OF HURTING YOURSELF: 0

## 2021-01-13 ASSESSMENT — COLUMBIA-SUICIDE SEVERITY RATING SCALE - C-SSRS
2. HAVE YOU ACTUALLY HAD ANY THOUGHTS OF KILLING YOURSELF?: NO
6. HAVE YOU EVER DONE ANYTHING, STARTED TO DO ANYTHING, OR PREPARED TO DO ANYTHING TO END YOUR LIFE?: NO
1. WITHIN THE PAST MONTH, HAVE YOU WISHED YOU WERE DEAD OR WISHED YOU COULD GO TO SLEEP AND NOT WAKE UP?: NO

## 2021-01-14 DIAGNOSIS — F33.2 SEVERE EPISODE OF RECURRENT MAJOR DEPRESSIVE DISORDER, WITHOUT PSYCHOTIC FEATURES (HCC): Primary | ICD-10-CM

## 2021-01-14 RX ORDER — CITALOPRAM 20 MG/1
20 TABLET ORAL DAILY
Qty: 90 TABLET | Refills: 3 | Status: SHIPPED | OUTPATIENT
Start: 2021-01-14 | End: 2022-01-10

## 2021-01-14 NOTE — PROGRESS NOTES
[facility]    Time Start: 1:05pm    Time End:  2:15pm   Date of Service:  1/13/2021    Basis of Evaluation:   [x]  Clinical Interview  [x]  Review of Medical Record    [x] Patient Health Questionnaire (PHQ)  []  Interview family member    Presenting Concerns:  Luane Cushing is a 31 y.o. who was referred by her primary care physician, Saulo Hatch. Sheron Gonzalez DO, due to concerns about depression and anxiety. Odalys reported the following symptoms of depression : anhedonia, depressed mood, difficulty concentrating, fatigue, feelings of worthlessness/guilt and hopelessness. She also reported crying spells, loss of energy, sense of worthlessness, sense of helplessness, sense of hopelessness, irritability, too much sleep, sad mood, loss of interest and anxiousness. In addition, Odalys reported symptoms of excessive worry, fearfulness and depression. There is no evidence of mare or a thought disorder. She reported that her symptoms began \"all throughout childhood, but became noticeably worse later in college when classes became smaller. 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 \"    Previous behavioral health treatment history: None  has a current medication list which includes the following prescription(s): escitalopram.    PHQ Scores 1/13/2021 10/7/2020 8/26/2020   PHQ2 Score 6 2 3   PHQ9 Score 24 7 8     Interpretation of Total Score Depression Severity: 1-4 = Minimal depression, 5-9 = Mild depression, 10-14 = Moderate depression, 15-19 = Moderately severe depression, 20-27 = Severe depression    Odalys    Mental Status:  Appearance: age appropriate, casually dressed and poor hygiene   Affect:  consistent with expectations based upon mood and flat   Attitude: Cooperative, [place] and Friendly   Mood:   Dysphoric and Anxious Thought Process:  Flight of ideas, Disorganized and goal directed   Delusions: no evidence of delusions   Perceptions: Depersonalization   Behavior:   open, friendly, cooperative, restless, tearful and difficult to redirect   Psychomotor: Within normal limits   Speech: Within normal limits   Eye Contact: fair   Orientation:  oriented to person, place, time, and general circumstances   Judgment & Insight:  normal insight and judgment     Risk Assessment:  Current Suicide Risk:  no suicidal ideation, nonsuicidal morbid ideation  Current Homicide Risk:  no homocidal ideation    Odalys reported no previous history of suicidal ideation or behavior. Social History/Functioning:  Elza Jimenez is currently living in a home with her  Josefa Mora,  3yrs, together a total of 8yrs), numerous farm animals, and reported a good social support network including her sister and parents, though they live in [place], and describes her mother as having \"poor boundaries. \". She denied any current cultural or spiritual concerns. Social History     Socioeconomic History    Marital status:      Spouse name: Rubi Scott Number of children: Not on file    Years of education: Not on file    Highest education level: Not on file   Occupational History    Not on file   Social Needs    Financial resource strain: Not hard at all   [employer] insecurity     Worry: Never true     Inability: Never true   [employer] needs     Medical: No     Non-medical: No   Tobacco Use    Smoking status: Never Smoker    Smokeless tobacco: Never Used   Substance and Sexual Activity    Alcohol use:  Yes     Alcohol/week: 6.0 standard drinks     Types: 3 Glasses of wine, 3 Cans of beer per week    Drug use: Never    Sexual activity: Yes     Partners: Male   Lifestyle    Physical activity     Days per week: 7 days     Minutes per session: 60 min    Stress: Rather much   Relationships    Social connections     Talks on phone: Twice a week     Gets and depressive symptoms have worsened, therapist will follow-up with primary care physician to prescribe an antidepressant, if agreeable. Initial Treatment Plan/Recommendations:    No follow-ups on file. Contact Rosario Monsivais as needed. Electronically signed by Rosario Monsivais Ed.D., [facility]  Licensed Professional Clinical Counselor  Director of [place] [id number] and [facility] Medicine Residency Program at [address]    This note will not be viewable in Tasspasshart for the following reason(s). This is a Psychotherapy Note.

## 2021-01-20 ENCOUNTER — OFFICE VISIT (OUTPATIENT)
Dept: PRIMARY CARE CLINIC | Age: 26
End: 2021-01-20
Payer: COMMERCIAL

## 2021-01-20 DIAGNOSIS — F41.1 GENERALIZED ANXIETY DISORDER: ICD-10-CM

## 2021-01-20 DIAGNOSIS — F33.9 MAJOR DEPRESSION, RECURRENT, CHRONIC (HCC): Primary | ICD-10-CM

## 2021-01-20 PROCEDURE — 90837 PSYTX W PT 60 MINUTES: CPT | Performed by: COUNSELOR

## 2021-01-21 NOTE — PROGRESS NOTES
[address] [facility]    Time Start: 9:00 am    Time End:  10:00 am   Date of Service:  1/20/2021    Subjective:  Odalys reported a significant increase in positive mood, resulting from new medication prescribed by Dr. Timmy Avendano, as well as the recent multi-day visit from her sister and her two nieces. She shared, \"I just feel so much better and want to dance. \" Further, Odalys shared, \"I hope I stay feeling this way after my sister and nieces leave to return home this afternoon. \" In addition to a more positive mood, Odalys reported that her  has been incredibly positive since her sister's visit (which is not typically the case). She remains optimistic the positivity will remain a constant, and will report back on her mood, post-family visit, next week's session. Odalys shared that having a positive mood has implored her to seek out having some difficult conversations with her  that have been ruminating for some time. First, Bony Maddox approached her  about his chronic use of marijuana, challenging him to find alternate coping mechanisms to deal with physical pain, and offered her support to help him in any way he needed. Her  was agreeable to decreasing use, and has gone from daily use to 1x/week. Second, Bony Maddox discussed their ongoing attempt to get pregnant (a triggering subject for her  because he thinks he is responsible for their fertility issues). A sperm sample has been submitted for evaluation, and both Odalys and her  will have their results within a week. Therapist and Bony Maddox role-played conversations with her  regarding both outcomes of the results. Odalys was complimented on her healthy communication techniques in application to these 'tough' conversations with her .     Last, Odalys described her desire to take more of a role in her community, specifically with serving the homeless population in some way. She expressed a desire to create a daily schedule for accountability, as well as to do some preliminary research on areas she can provide service to the homeless community. Objective:  Mental Status  Appearance: age appropriate and casually dressed   Affect:  consistent with expectations based upon mood   Attitude: Cooperative and Friendly   Mood:   Anxious   Thought Process:  Linear and goal directed   Delusions: no evidence of delusions   Perceptions: No perceptual disturbance   Behavior:   open, friendly, cooperative   Psychomotor: Within normal limits   Speech: Within normal limits   Eye Contact: good   Orientation:  oriented to person, place, time, and general circumstances   Judgment & Insight:  normal insight and judgment     Risk Assessment:  Current Suicide Risk:  no suicidal ideation, nonsuicidal morbid ideation  Current Homicide Risk:  no homocidal ideation    Diagnosis:   Diagnosis Orders   1. Major depression, recurrent, chronic ([facility] [address]     2. Generalized anxiety disorder         Plan/Recommendations: Today, Odalys participated in treatment for treatment for depression and anxiety. Primary goals of therapy continue to be collaboratively identified as creating a daily schedule, work through challenges regarding infertility, empower self-esteem and confidence-building, dispute irrational thoughts, and insight development into depression and anxiety symptoms. Continue use of Cognitive Behavioral therapy to challenge irrational thoughts, Psychoanalysis for insight development, Existential Therapy for acceptance and motivation, and Dialectical Behavioral Therapy to address emotional management. Electronically signed by Cynthia Mullins Ed.D., [facility]  Licensed Professional Clinical Counselor  Director of [facility] and [facility] Medicine Residency Program at [facility]    This note will not be viewable in GOBAhart for the following reason(s).  This is a Psychotherapy Note.

## 2021-01-27 ENCOUNTER — OFFICE VISIT (OUTPATIENT)
Dept: PRIMARY CARE CLINIC | Age: 26
End: 2021-01-27
Payer: COMMERCIAL

## 2021-01-27 DIAGNOSIS — F41.1 GENERALIZED ANXIETY DISORDER: ICD-10-CM

## 2021-01-27 DIAGNOSIS — F33.9 MAJOR DEPRESSION, RECURRENT, CHRONIC (HCC): Primary | ICD-10-CM

## 2021-01-27 PROCEDURE — 90837 PSYTX W PT 60 MINUTES: CPT | Performed by: COUNSELOR

## 2021-01-27 NOTE — PROGRESS NOTES
[facility]    Time Start: 9:00am   Time End:  10:00am  Date of Service:  1/27/2021    Subjective:  Saurabh Sanchez reports a \"very successful week. \" Her medication and participation in therapy is what she attributes to her success with depression. She is committed a to a new motto in life, \"Progress over [employer]. \" She wants to focus on noticing the subtle improvements and opportunities for growth, and has begun to notice these in just the past week. Some of these areas of growth include her emotionally mature response to her  sharing that his sperm count results are low, and responding calmly when her sister invaded her privacy by finding her antidepressants in the bathroom and \"judged\" her for having to take them. 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 It is her plan to discuss this plan, along with a desire to communicate emotions more with her , during an upcoming week-long camping retreat. Keeping a schedule, as outlined last session, has \"significantly\" contributed to decreased depression in the past week; though, she aims to wake up earlier to help her  feed the animals in the morning. Techniques for waking up when the alarm goes off were discussed. Objective:  Mental Status  Appearance: age appropriate and casually dressed   Affect:  consistent with expectations based upon mood   Attitude: Cooperative, Derral Villanueva, Friendly and Help-seeking   Mood:   Anxious    Thought Process:  Linear and goal directed   Delusions: no evidence of delusions   Perceptions: No perceptual disturbance   Behavior:   open, friendly and cooperative   Psychomotor: Within normal limits   Speech:    Within normal limits   Eye Contact: good   Orientation:  oriented to person, place, time, and general circumstances   Judgment & Insight:  normal insight and judgment     Risk Assessment:  Current Suicide

## 2021-02-10 ENCOUNTER — OFFICE VISIT (OUTPATIENT)
Dept: PRIMARY CARE CLINIC | Age: 26
End: 2021-02-10
Payer: COMMERCIAL

## 2021-02-10 DIAGNOSIS — F41.1 GENERALIZED ANXIETY DISORDER: ICD-10-CM

## 2021-02-10 DIAGNOSIS — F33.41 RECURRENT MAJOR DEPRESSIVE DISORDER, IN PARTIAL REMISSION (HCC): Primary | ICD-10-CM

## 2021-02-10 PROCEDURE — 90837 PSYTX W PT 60 MINUTES: CPT | Performed by: COUNSELOR

## 2021-02-11 NOTE — PROGRESS NOTES
1000 [facility]    Time Start: 10:00am   Time End:  11:00am  Date of Service:  2/10/2021    Subjective:   Odalys shared that her anxiety within the past two weeks has been \"fine. 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 In regards to her depression, she reports that she is \"feeling okay. ..but would like to have more focus. \" Specifically, she continues to work on getting up at [address] [place] [address] to help her  with feeding the animals; however, she usually doesn't awake until 8 or 9am. She was successful getting up early one day in the past week. Clinician reminded her of the motto Odalys shared in a previous session, \"progress over perfection,\" and applauded her ability to be successful for the one day she awoke on-time. Odalys shared that the recent 1-week getaway with her  was \"very restful and needed. \" Several goals were set last session for this trip, including creating a personal and business plan for the upcoming year with her . These goals were reportedly completed, as evidenced by the two having created a work schedule (something Odalys asked for to assist in identifying her role within the business), and talking more about how they wanted to spend quality time nurturing their relationship within the upcoming year. 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 Odalys expressed a desire to increase exercise and improve communication with her , but feels these goals will not be top priority until the next phase of the business tasks are complete (next 2-3 months).     Objective:  Mental Status  Appearance: age appropriate, casually dressed and within normal Limits   Affect:  consistent with expectations based upon mood   Attitude: Cooperative and Engageable   Mood:   Describe: Content   Thought Process:  Linear and goal directed   Delusions: no evidence of delusions   Perceptions: No perceptual disturbance   Behavior:   open and cooperative   Psychomotor: Within normal limits   Speech: Within normal limits   Eye Contact: good   Orientation:  oriented to person, place, time, and general circumstances   Judgment & Insight:  normal insight and judgment     Risk Assessment:  Current Suicide Risk:  no suicidal ideation, nonsuicidal morbid ideation  Current Homicide Risk:  no homocidal ideation    Diagnosis:   Diagnosis Orders   1. Recurrent major depressive disorder, in partial remission ([facility] [address]     2. Generalized anxiety disorder         Plan/Recommendations: Sara Bazzi participated in treatment for treatment for depression and anxiety. Primary goals of therapy continue to be collaboratively identified as creating a daily schedule, work through challenges regarding infertility, empower self-esteem and confidence-building, dispute irrational thoughts, and insight development into decreasing symptoms of depression and anxiety. Continue use of Cognitive Behavioral therapy to challenge irrational thoughts, Psychoanalysis for insight development, Existential Therapy for acceptance and motivation, and Dialectical Behavioral Therapy to address emotional management.       Electronically signed by Loida Dick Ed.D., [facility]  Licensed Professional Clinical Counselor  Director of Behavioral Medicine  Family and Community Medicine Residency Program at [facility]  This note will not be viewable in Jay[employer] for the following reason(s). This is a Psychotherapy Note.

## 2021-02-24 ENCOUNTER — OFFICE VISIT (OUTPATIENT)
Dept: PRIMARY CARE CLINIC | Age: 26
End: 2021-02-24
Payer: COMMERCIAL

## 2021-02-24 ENCOUNTER — HOSPITAL ENCOUNTER (OUTPATIENT)
Age: 26
Discharge: HOME | End: 2021-02-24
Payer: COMMERCIAL

## 2021-02-24 VITALS
HEART RATE: 72 BPM | OXYGEN SATURATION: 98 % | SYSTOLIC BLOOD PRESSURE: 114 MMHG | HEIGHT: 63 IN | BODY MASS INDEX: 23.92 KG/M2 | DIASTOLIC BLOOD PRESSURE: 65 MMHG | TEMPERATURE: 98.1 F | WEIGHT: 135 LBS

## 2021-02-24 DIAGNOSIS — F33.41 RECURRENT MAJOR DEPRESSIVE DISORDER IN PARTIAL REMISSION (HCC): Primary | ICD-10-CM

## 2021-02-24 DIAGNOSIS — G47.10 EXCESSIVE SLEEPINESS: ICD-10-CM

## 2021-02-24 DIAGNOSIS — R53.83 FATIGUE, UNSPECIFIED TYPE: ICD-10-CM

## 2021-02-24 DIAGNOSIS — F32.A ANXIETY AND DEPRESSION: ICD-10-CM

## 2021-02-24 DIAGNOSIS — F41.9 ANXIETY AND DEPRESSION: ICD-10-CM

## 2021-02-24 DIAGNOSIS — F41.1 GENERALIZED ANXIETY DISORDER: ICD-10-CM

## 2021-02-24 DIAGNOSIS — R53.83 FATIGUE, UNSPECIFIED TYPE: Primary | ICD-10-CM

## 2021-02-24 LAB
A/G RATIO: 1.5 (ref 1.1–2.2)
ALBUMIN SERPL-MCNC: 4.4 G/DL (ref 3.4–5)
ALP BLD-CCNC: 63 U/L (ref 40–129)
ALT SERPL-CCNC: 13 U/L (ref 10–40)
ANION GAP SERPL CALCULATED.3IONS-SCNC: 9 MMOL/L (ref 3–16)
AST SERPL-CCNC: 19 U/L (ref 15–37)
BASOPHILS ABSOLUTE: 0 K/UL (ref 0–0.2)
BASOPHILS RELATIVE PERCENT: 0.8 %
BILIRUB SERPL-MCNC: 1 MG/DL (ref 0–1)
BUN BLDV-MCNC: 10 MG/DL (ref 7–20)
CALCIUM SERPL-MCNC: 9.5 MG/DL (ref 8.3–10.6)
CHLORIDE BLD-SCNC: 101 MMOL/L (ref 99–110)
CHOLESTEROL, TOTAL: 196 MG/DL (ref 0–199)
CO2: 25 MMOL/L (ref 21–32)
CREAT SERPL-MCNC: 0.6 MG/DL (ref 0.6–1.1)
EOSINOPHILS ABSOLUTE: 0.2 K/UL (ref 0–0.6)
EOSINOPHILS RELATIVE PERCENT: 2.6 %
GFR AFRICAN AMERICAN: >60
GFR NON-AFRICAN AMERICAN: >60
GLOBULIN: 2.9 G/DL
GLUCOSE BLD-MCNC: 62 MG/DL (ref 70–99)
HCT VFR BLD CALC: 37.6 % (ref 36–48)
HDLC SERPL-MCNC: 58 MG/DL (ref 40–60)
HEMOGLOBIN: 13.1 G/DL (ref 12–16)
LDL CHOLESTEROL CALCULATED: 118 MG/DL
LYMPHOCYTES ABSOLUTE: 1.6 K/UL (ref 1–5.1)
LYMPHOCYTES RELATIVE PERCENT: 28 %
MCH RBC QN AUTO: 31 PG (ref 26–34)
MCHC RBC AUTO-ENTMCNC: 34.8 G/DL (ref 31–36)
MCV RBC AUTO: 89.1 FL (ref 80–100)
MONOCYTES ABSOLUTE: 0.4 K/UL (ref 0–1.3)
MONOCYTES RELATIVE PERCENT: 6.3 %
NEUTROPHILS ABSOLUTE: 3.6 K/UL (ref 1.7–7.7)
NEUTROPHILS RELATIVE PERCENT: 62.3 %
PDW BLD-RTO: 12.4 % (ref 12.4–15.4)
PLATELET # BLD: 285 K/UL (ref 135–450)
PMV BLD AUTO: 7.8 FL (ref 5–10.5)
POTASSIUM SERPL-SCNC: 3.9 MMOL/L (ref 3.5–5.1)
RBC # BLD: 4.21 M/UL (ref 4–5.2)
SODIUM BLD-SCNC: 135 MMOL/L (ref 136–145)
TOTAL PROTEIN: 7.3 G/DL (ref 6.4–8.2)
TRIGL SERPL-MCNC: 101 MG/DL (ref 0–150)
TSH REFLEX FT4: 0.78 UIU/ML (ref 0.27–4.2)
TSH REFLEX: NORMAL UIU/ML (ref 0.27–4.2)
VITAMIN D 25-HYDROXY: 33.1 NG/ML
VLDLC SERPL CALC-MCNC: 20 MG/DL
WBC # BLD: 5.8 K/UL (ref 4–11)

## 2021-02-24 PROCEDURE — 90837 PSYTX W PT 60 MINUTES: CPT | Performed by: COUNSELOR

## 2021-02-24 PROCEDURE — 82306 VITAMIN D 25 HYDROXY: CPT

## 2021-02-24 PROCEDURE — 83036 HEMOGLOBIN GLYCOSYLATED A1C: CPT

## 2021-02-24 PROCEDURE — 85025 COMPLETE CBC W/AUTO DIFF WBC: CPT

## 2021-02-24 PROCEDURE — 99213 OFFICE O/P EST LOW 20 MIN: CPT | Performed by: FAMILY MEDICINE

## 2021-02-24 PROCEDURE — 36415 COLL VENOUS BLD VENIPUNCTURE: CPT

## 2021-02-24 PROCEDURE — 80053 COMPREHEN METABOLIC PANEL: CPT

## 2021-02-24 PROCEDURE — 84443 ASSAY THYROID STIM HORMONE: CPT

## 2021-02-24 PROCEDURE — 80061 LIPID PANEL: CPT

## 2021-02-24 ASSESSMENT — ENCOUNTER SYMPTOMS
COUGH: 0
VOMITING: 0
NAUSEA: 0
SHORTNESS OF BREATH: 0

## 2021-02-24 NOTE — PATIENT INSTRUCTIONS
Patient Education        Fatigue: Care Instructions  Your Care Instructions     Fatigue is a feeling of tiredness, exhaustion, or lack of energy. You may feel fatigue because of too much or not enough activity. It can also come from stress, lack of sleep, boredom, and poor diet. Many medical problems, such as viral infections, can cause fatigue. Emotional problems, especially depression, are often the cause of fatigue. Fatigue is most often a symptom of another problem. Treatment for fatigue depends on the cause. For example, if you have fatigue because you have a certain health problem, treating this problem also treats your fatigue. If depression or anxiety is the cause, treatment may help. Follow-up care is a key part of your treatment and safety. Be sure to make and go to all appointments, and call your doctor if you are having problems. It's also a good idea to know your test results and keep a list of the medicines you take. How can you care for yourself at home? · Get regular exercise. But don't overdo it. Go back and forth between rest and exercise. · Get plenty of rest.  · Eat a healthy diet. Do not skip meals, especially breakfast.  · Reduce your use of caffeine, tobacco, and alcohol. Caffeine is most often found in coffee, tea, cola drinks, and chocolate. · Limit medicines that can cause fatigue. This includes tranquilizers and cold and allergy medicines. When should you call for help? Watch closely for changes in your health, and be sure to contact your doctor if:    · You have new symptoms such as fever or a rash.     · Your fatigue gets worse.     · You have been feeling down, depressed, or hopeless. Or you may have lost interest in things that you usually enjoy.     · You are not getting better as expected. Where can you learn more? Go to [email or web address] and sign in to your [email or web address] account.  Enter O886 in the [employer] box to learn more about \"Fatigue: Care Instructions. \"     If you do not have an account, please click on the \"Sign Up Now\" link. Current as of: June 26, 2019               Content Version: 12.6  © 9862-5834 [employer]. Care instructions adapted under license by [facility] ([facility]). If you have questions about a medical condition or this instruction, always ask your healthcare professional. [address] any warranty or liability for your use of this information.

## 2021-02-25 LAB
ESTIMATED AVERAGE GLUCOSE: 76.7 MG/DL
HBA1C MFR BLD: 4.3 %

## 2021-02-25 NOTE — PROGRESS NOTES
1000 [facility]    Time Start: 9:00am    Time End:  10:00am  Date of Service:  2/24/2021    Subjective:  Marina Wing began the session describing her previous two weeks as having been \"really good. \" She continues to experience decreased symptoms of depression and anxiety, and attributes the success to her new medication, as well as her motivation to using skills learned in clinical therapy. While doing well clinically, Odalys has reported \"significant fatigue\" and wonders if this is not attributed as a side effect from her anti-anxiety medication. 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 Overall, Deja Henderson spent a good portion of the session describing how everything is going really well, yet really busy with the new business, and did not seem inspired to discuss any systemic or familial issues; however, when asked to discuss the nature of her relationship with her mother, which has been previously discussed as \"fine,\" Odalys seemed uniquely triggered and shared that she always has to \"force herself\" to communicate with her mom. She went into further detail regarding her lack of relationship with her mom and her perception of her mom, which includes feeling that she is \"lazy and has lost having any role for herself in her life because she does not engage in work or hobbies. \" Marina Wing was agreeable to explore the impacts that her perception of her mom has on the decisions she makes in her life in light of having strong perceptions of her mothers behavior and her desire to \"be nothing like her. \"      Objective:  Mental Status  Appearance: age appropriate, casually dressed and within normal Limits   Affect:  consistent with expectations based upon mood   Attitude: Cooperative   Mood:   Anxious   Thought Process:  Linear and goal directed   Delusions: no evidence of delusions and mood congruent   Perceptions: No perceptual disturbance   Behavior:   open   Psychomotor: Within normal limits   Speech: Within normal limits   Eye Contact: good   Orientation:  oriented to person, place, time, and general circumstances   Judgment & Insight:  normal insight and judgment     Risk Assessment:  Current Suicide Risk:  no suicidal ideation, nonsuicidal morbid ideation  Current Homicide Risk:  no homocidal ideation    Diagnosis:   Diagnosis Orders   1. Recurrent major depressive disorder in partial remission ([facility] [address]     2. Generalized anxiety disorder         Plan/Recommendations: Ruben Ball participated in treatment for treatment for depression and anxiety. Primary goals of therapy continue to be collaboratively identified as creating a daily schedule, work through challenges regarding infertility, empower self-esteem and confidence-building, dispute irrational thoughts, and insight development into decreasing symptoms of depression and anxiety. Continue use of Cognitive Behavioral therapy to challenge irrational thoughts, Psychoanalysis for insight development, Existential Therapy for acceptance and motivation, and Dialectical Behavioral Therapy to address emotional management.       Electronically signed by Gonzalez Hunter Ed.D., [facility]  Licensed Professional Clinical Counselor  Director of Behavioral Medicine  Family and Community Medicine Residency Program at [facility]    This note will not be viewable in [employer] for the following reason(s). This is a Psychotherapy Note.

## 2021-02-25 NOTE — RESULT ENCOUNTER NOTE
All of your labs look okay. I do not see any lab results that explain your fatigue. Please follow up with recommendations as we discussed at your appointment. Thank you.

## 2021-04-07 ENCOUNTER — OFFICE VISIT (OUTPATIENT)
Dept: PRIMARY CARE CLINIC | Age: 26
End: 2021-04-07
Payer: COMMERCIAL

## 2021-04-07 DIAGNOSIS — F33.41 RECURRENT MAJOR DEPRESSIVE DISORDER IN PARTIAL REMISSION (HCC): Primary | ICD-10-CM

## 2021-04-07 PROCEDURE — 90837 PSYTX W PT 60 MINUTES: CPT | Performed by: COUNSELOR

## 2021-04-07 NOTE — PROGRESS NOTES
1000 [facility]    Time Start: 9:00am   Time End: 10:00am  Date of Service:  4/7/2021    Subjective:  Odalys shared she has been doing [place] good\" over the course of the past month, in regards to depression symptoms. 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 \" She noticed that her schedule requires her to work every Saturday over the course of the next year, so she made a surprise visit to her hometown in [place] to visit with her parents and siblings. Odalys reports the visit went well, except for a moment where she became frustrated in conversation with her mother. In this instance, Maykel Edward felt her mother was \"complaining for over an hour about something tedious, and didn't even ask me how I was doing. \" Further, Odalys shared that she proposed her mother see a therapist, to which her mother became offended. With the exception of that exchange, Odalys shared the rest of her three day trip with her family went very well. Jonas Rowland communicated her ongoing struggle to strengthen communication with her . She reports her 's addiction to marijuana, and attempt to quit, renders him incapable of effectively communicating stressors and feelings within the relationship. 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 Odalys role-played an upcoming conversation with her , using \"I\" statements to invite her  to seek clinical therapy for mental health issues.  Follow up next session on progress of conversation. As an ongoing goal, Larissa Giles seeks to further develop her self-identity outside of work and family roles. She seeks intellectually fulfill herself by taking a permaculture class, and aims to have more dinners with friends to carve time out for wellness. Assess wellness next session. Objective:  Mental Status  Appearance: age appropriate, casually dressed and within normal Limits   Affect:  consistent with expectations based upon mood   Attitude: Cooperative, [place] and Friendly   Mood:   Dysphoric and Anxious   Thought Process:  Linear and goal directed   Delusions: no evidence of delusions   Perceptions: No perceptual disturbance   Behavior:   open, friendly, cooperative and restless   Psychomotor: Within normal limits   Speech: Within normal limits   Eye Contact: good   Orientation:  oriented to person, place, time, and general circumstances   Judgment & Insight:  normal insight and judgment     Risk Assessment:  Current Suicide Risk:  no suicidal ideation, nonsuicidal morbid ideation  Current Homicide Risk:  no homocidal ideation    Diagnosis:   Diagnosis Orders   1. Recurrent [facility]         Plan/Recommendations: Today, Odalys participated in treatment for treatment for depression and anxiety. Primary goals of therapy continue to be collaboratively identified as creating a daily schedule, work through challenges regarding infertility, empower self-esteem and confidence-building, dispute irrational thoughts, and insight development into decreasing symptoms of depression and anxiety. Continue use of Cognitive Behavioral therapy to challenge irrational thoughts, Psychoanalysis for insight development, Existential Therapy for acceptance and motivation, and Dialectical Behavioral Therapy to address emotional management.        Electronically signed by Maeve Bourgeois Ed.D., [facility]  Licensed Professional Clinical Counselor  Director of Behavioral

## 2021-04-28 ENCOUNTER — OFFICE VISIT (OUTPATIENT)
Dept: BEHAVIORAL/MENTAL HEALTH CLINIC | Age: 26
End: 2021-04-28
Payer: COMMERCIAL

## 2021-04-28 DIAGNOSIS — F33.41 RECURRENT MAJOR DEPRESSIVE DISORDER IN PARTIAL REMISSION (HCC): Primary | ICD-10-CM

## 2021-04-28 DIAGNOSIS — F41.1 GENERALIZED ANXIETY DISORDER: ICD-10-CM

## 2021-04-28 PROCEDURE — 90837 PSYTX W PT 60 MINUTES: CPT | Performed by: COUNSELOR

## 2021-04-28 NOTE — PROGRESS NOTES
1000 [facility]    Time Start: 10:11am   Time End:  11:10am  Date of Service:  4/28/2021    Subjective:  Odalys shared her anxiety and depression has remained at a lower level over the past several weeks, which she attributes to effective medication and employing clinical techniques learned in therapy She reports several experiences over the past several weeks that have caused some incidences of anxiety and depression, including her grandfather's passing last week, a \"very big fight\" with her mother last week, ongoing marital challenges with her , and her inability to consistently wake up at 8am.    In response to her delayed wake time in the morning, Chicho Catherine has agreed to complete a sleep journal, in order to identify patterns of behavior relating to her abilities and inabilities to consistently awake at 8am each morning. Review next session. Odalys shared that she struggles with her mother having issue with the boundaries Odalys has placed regarding her decision not to share her mental health journey, and the judgment that exist because of this decision. Poonam Calderón became upset when she noticed her mother texting everyone in the family about her sister needing to know specific information because of anxiety that she was experiencing. Odalys perceived it was \"out of place and wrong\" for her mother to divulge her sister's anxiety to everyone in the family. She explored and processed the insight of her projecting her own mental health journey boundaries onto her sister, and agreed this occurred. Odalys confirmed that her  continues to struggle with excessive marijuana use, and has not been following his doctors request to complete a food journal. These efforts have been outlined in order to increase his current low sperm count, as it relates to their ability to become pregnant.  Odalys shared that she continues to support her  through his Truong Owens  This note will not be viewable in MyChart for the following reason(s). This is a Psychotherapy Note.

## 2021-05-12 ENCOUNTER — OFFICE VISIT (OUTPATIENT)
Dept: BEHAVIORAL/MENTAL HEALTH CLINIC | Age: 26
End: 2021-05-12
Payer: COMMERCIAL

## 2021-05-12 DIAGNOSIS — F33.41 RECURRENT MAJOR DEPRESSIVE DISORDER, IN PARTIAL REMISSION (HCC): Primary | ICD-10-CM

## 2021-05-12 DIAGNOSIS — F41.1 GENERALIZED ANXIETY DISORDER: ICD-10-CM

## 2021-05-12 PROCEDURE — 99215 OFFICE O/P EST HI 40 MIN: CPT | Performed by: COUNSELOR

## 2021-05-13 NOTE — PROGRESS NOTES
1000 [facility]    Time Start: 10:00am  Time End:  11:01   Date of Service:  5/12/2021    Subjective:  Odalys shared the past couple of weeks have been a bit stressful, specifically as it relates to her reported inability to focus and complete projects. For example, she outlined several tasks that include property maintenance, accounting, cleaning out items in a barn, as some projects that she has started but has not completed. Odalys reports a noticeable difference in her inability to focus, compared to her ability to focus in the past. She is curious if her lack of focus is attributed to her depression medication. Clinician and Tamanna Torrez outlined a list of existing variables in Vivian's life that would justifiably validate focus challenges. Odalys agreed the broad scope of tasks that need to be completed prevent her from preferably diving into depths of just a couple projects. Furthermore., Sabine Ginny attributes a sense of burnout to lack of wellness. Odalys spent last Saturday night inviting friends over to her house and was enjoying herself for the first time in days until she reported that her  pulled her aside to ask why everyone else 'gets the best version of her and he gets the worst.' Odalys acknowledges the productive value of having social connections, external to her , to her overall health and wellness, but reports feeling guilty that her  doesn't always get the best version of her. Role-playing and communication in these specific scenarios will be conducted next session where Odalys can assertively communicate her needs in the relationship. Odalys confirmed she would delay requesting a decrease dose of antidepressant, and would further assess the origin of her lack of focus. She will report back on her assessment next session.     Objective:  Mental Status  Appearance: age appropriate, casually dressed and within normal Limits Affect:  consistent with expectations based upon mood   Attitude: Cooperative and Engageable   Mood:   Dysphoric   Thought Process:  Linear and goal directed   Delusions: no evidence of delusions   Perceptions: No perceptual disturbance   Behavior:   open, cooperative and tearful   Psychomotor: Within normal limits   Speech: Within normal limits   Eye Contact: good   Orientation:  oriented to person, place, time, and general circumstances   Judgment & Insight:  normal insight and judgment     Risk Assessment:  Current Suicide Risk:  no suicidal ideation, nonsuicidal morbid ideation  Current Homicide Risk:  no homocidal ideation    Diagnosis:   Diagnosis Orders   1. Recurrent major depressive disorder, in partial remission ([facility] [address]     2. Generalized anxiety disorder         Plan/Recommendations:  Continue clinical therapy treatment using evidence-based techniques to address collaboratively identified goals, including creating and following a daily schedule, process and address challenges regarding infertility, empower self-esteem and confidence-building, dispute irrational thoughts, and insight development into decreasing symptoms of depression and anxiety. Continue use of Cognitive Behavioral therapy to challenge irrational thoughts, Psychoanalysis for insight development, Existential Therapy for acceptance and motivation, and Dialectical Behavioral Therapy to address emotional management.       Electronically signed by Ceci Marte Ed.D., [facility]  Licensed Professional Clinical Counselor  Director of Behavioral Medicine  Family and Community Medicine Residency Program at [address] [place] [address]  This note will not be viewable in [employer] for the following reason(s). This is a Psychotherapy Note.

## 2021-05-26 ENCOUNTER — OFFICE VISIT (OUTPATIENT)
Dept: BEHAVIORAL/MENTAL HEALTH CLINIC | Age: 26
End: 2021-05-26
Payer: COMMERCIAL

## 2021-05-26 DIAGNOSIS — F33.41 RECURRENT MAJOR DEPRESSIVE DISORDER, IN PARTIAL REMISSION (HCC): Primary | ICD-10-CM

## 2021-05-26 DIAGNOSIS — F41.1 GENERALIZED ANXIETY DISORDER: ICD-10-CM

## 2021-05-26 PROCEDURE — 90837 PSYTX W PT 60 MINUTES: CPT | Performed by: COUNSELOR

## 2021-05-26 NOTE — PROGRESS NOTES
1000 [facility]    Time Start: 10:07am   Time End:  11:00am  Date of Service:  2021    Subjective:  Odalys described the past week as being \"moderately better\" than last. While she attributes decreased anxiety and depression symptoms, she remains unclear about her perceived \"extreme lack of focus\" in her personal and professional life. She communicates having created schedules, lists, charts, etc.; however, she finds herself wandering from project to project and unable to focus on finishing them. Clinician was able to have Odalys share several projects that she did finish, including a bedroom location change. Clinician ruled out potential issues with wellness, boredom, and anxiety. No clear or obvious reason for lack of focus has surfaced or developed to insight. Clinician invited Talita Silva to use the '94253' grounding technique to keep herself presentt and in the moment when she feels her mind wander. Assess for adult ADHD next session. While Odalys communicated that anxiety and depression symptoms have mostly been averted since last session, she shared the death anniversary of a close college friend on Tuesday, who passed from suicide five years ago, kept her feeling in a sad mood all day this past Tuesday. Further, this event kept her low because a mutual friend of the  friend has recently had a baby, reminding Odalys of the challenges in her own situation where she has not been able to conceive. Odalys shared she was able to communicate her thoughts and feelings about the mood shift with her  Soham Sotelo, despite knowing that bringing up the infertility is a sensitive subject for him. Odalys shared that she will be driving to [place] to spend the weekend with her parents and siblings, and she remains optimistic that it will be a fun trip. Recent family trips have resulted in verbal arguments between Talita Silva and her mother and sister. Follow-up next session. Objective:  Mental Status  Appearance: age appropriate, casually dressed and within normal Limits   Affect:  flat   Attitude: Cooperative   Mood:   Dysphoric and Anxious   Thought Process:  Linear and goal directed   Delusions: no evidence of delusions   Perceptions: No perceptual disturbance   Behavior:   open, friendly, cooperative and tearful   Psychomotor: Within normal limits   Speech:   [place]   Eye Contact: fair   Orientation:  oriented to person, place, time, and general circumstances   Judgment & Insight:  normal insight and judgment     Risk Assessment:  Current Suicide Risk:  no suicidal ideation, nonsuicidal morbid ideation  Current Homicide Risk:  no homocidal ideation    Diagnosis:   Diagnosis Orders   1. Recurrent major depressive disorder, in partial remission ([facility] [address]     2. Generalized anxiety disorder         Plan/Recommendations:  Continue clinical therapy treatment using evidence-based techniques to address collaboratively identified goals, including creating and following a daily schedule, process and address challenges regarding infertility, empower self-esteem and confidence-building, dispute irrational thoughts, and insight development into decreasing symptoms of depression and anxiety.  Continue use of Cognitive Behavioral therapy to challenge irrational thoughts, Psychoanalysis for insight development, Existential Therapy for acceptance and motivation, and Dialectical Behavioral Therapy to address emotional management.       Electronically signed by Basil Albrecht Ed.D., [facility]  Licensed Professional Clinical Counselor  Director of Behavioral Medicine  [facility] Residency Program at [address] [place] [address]

## 2021-06-23 ENCOUNTER — OFFICE VISIT (OUTPATIENT)
Dept: BEHAVIORAL/MENTAL HEALTH CLINIC | Age: 26
End: 2021-06-23
Payer: COMMERCIAL

## 2021-06-23 DIAGNOSIS — F41.1 GENERALIZED ANXIETY DISORDER: ICD-10-CM

## 2021-06-23 DIAGNOSIS — F33.41 RECURRENT MAJOR DEPRESSIVE DISORDER, IN PARTIAL REMISSION (HCC): Primary | ICD-10-CM

## 2021-06-23 DIAGNOSIS — Z63.0 MARITAL STRESS: ICD-10-CM

## 2021-06-23 PROCEDURE — 99215 OFFICE O/P EST HI 40 MIN: CPT | Performed by: COUNSELOR

## 2021-06-23 SDOH — SOCIAL STABILITY - SOCIAL INSECURITY: PROBLEMS IN RELATIONSHIP WITH SPOUSE OR PARTNER: Z63.0

## 2021-06-23 NOTE — PROGRESS NOTES
[facility]    Time Start:1:04pm   Time End: 2:00pm   Date of Service:  6/23/2021    Subjective:  Odalys reported her depression and anxiety has increased since last session, correlated to marital stress and poor communication with her , Smooth Winter. They are business owners of a mushroom farm, and she feels that her  is hyper-critical of her productivity levels, as well as performance. Specifically, he does not help her when she asks for help. Further, she desires Smooth Winter to communicate more about his feelings, and to listen to hers, but indicates he does not effectively communicate, and does not seem to make any changes when she communicates what she needs. Marital counseling was recommended, and Odalys shared she would inquire with Smooth Winter about his interest.     Paula Garcia reports that she continues to struggle to get out of bed, mainly because she \"dreads another long day of hard work\" that she no longer feels inspired by. She does not enjoy the work anymore, and does not enjoy feeling like a failure at all tasks. Additionally, she reports saying \"I'm sorry\" repeatedly to Smooth Winter all throughout the day, and \"hates\" that she has become someone who \"apologizes all of the time. \" She has considered alternative employment. Another variable contributing to her depression and anxiety include her feeling that Mathew's family does not prioritize spending time or \"making and effort\" in engaging with them. She provided several examples where they were left out or \"not considered\" when family plans were made. Paula Garcia is frustrated that Mathew's response to her frustration is to \"communicate with them. \" She seeks more actionable response from Smooth Winter. In this regard, and all areas listed during session, Paula Garcia was invited to communicate with Smooth Winter once more, with specific points and areas of support needed. Follow-up next session.      Overall, Odalys reports that she has noticed an increase of feelings where she \"doesn't want to be alive. \" Currently, she experiences suicidal ideation, without intent or plan. Follow-up next session. Objective:  Mental Status  Appearance: age appropriate, casually dressed and within normal Limits   Affect:  flat   Attitude: Distant   Mood:   Dysphoric and Anxious   Thought Process:  Vague and goal directed   Delusions: no evidence of delusions   Perceptions: No perceptual disturbance   Behavior:   guarded, shy and tearful   Psychomotor: Within normal limits   Speech:   Soft and Laconic   Eye Contact: fair   Orientation:  oriented to person, place, time, and general circumstances   Judgment & Insight:  normal insight and judgment     Risk Assessment:  Current Suicide Risk:  suicidal ideation with no plan or intent, nonsuicidal morbid ideation  Current Homicide Risk:  no homocidal ideation    Diagnosis:   Diagnosis Orders   1. Recurrent major depressive disorder, in partial remission ([facility] [address]     2. Generalized anxiety disorder     3. Marital stress         Plan/Recommendations:  Continue clinical therapy treatment using evidence-based techniques to address collaboratively identified goals, including creating and following a daily schedule, process and address challenges regarding infertility, empower self-esteem and confidence-building, dispute irrational thoughts, and insight development into decreasing symptoms of depression and anxiety.  Continue use of Cognitive Behavioral therapy to challenge irrational thoughts, Psychoanalysis for insight development, Existential Therapy for acceptance and motivation, and Dialectical Behavioral Therapy to address emotional management.     Electronically signed by Kate Celaya Ed.D., [facility]  Licensed Professional Clinical Counselor  Director of Behavioral Medicine  [facility] Medicine Residency Program at [address] [place] [address]

## 2021-06-25 ENCOUNTER — TELEPHONE (OUTPATIENT)
Dept: PRIMARY CARE CLINIC | Age: 26
End: 2021-06-25

## 2021-06-25 NOTE — TELEPHONE ENCOUNTER
Informed pt of no appointments available and to go to urgent care. Pt expressed understand and stated that she will go.

## 2021-06-25 NOTE — TELEPHONE ENCOUNTER
----- Message from Chay Talley sent at 6/25/2021  8:48 AM EDT -----  Subject: Appointment Request    Reason for Call: Urgent Eye Problem    QUESTIONS  Type of Appointment? New Patient/New to Provider  Reason for appointment request? No appointments available during search  Additional Information for Provider? Patient eye is red and is having pain     ---------------------------------------------------------------------------  --------------  CALL BACK INFO  What is the best way for the office to contact you? OK to leave message on   voicemail  Preferred Call Back Phone Number? [phone]  ---------------------------------------------------------------------------  --------------  SCRIPT ANSWERS  Relationship to Patient? Self  Appointment reason? Symptomatic  Select script based on patient symptoms? Adult Eye Problem [Pink Eye,   Conjunctivitis, Glaucoma, Macular Degeneration]  Have you had an injury or trauma? No  Have you had sudden loss of vision? No  Are you having eye pain? Yes  Have you been diagnosed with, awaiting test results for, or told that you   are suspected of having COVID-19 (Coronavirus)? (If patient has tested   negative or was tested as a requirement for work, school, or travel and   not based on symptoms, answer no)? No  Do you currently have flu-like symptoms including fever or chills, cough,   shortness of breath, difficulty breathing, or new loss of taste or smell? No  Have you had close contact with someone with COVID-19 in the last 14 days? No  (Service Expert  click yes below to proceed with [place] As Usual   Scheduling)?  Yes

## 2021-07-14 ENCOUNTER — E-VISIT (OUTPATIENT)
Dept: PRIMARY CARE CLINIC | Age: 26
End: 2021-07-14
Payer: COMMERCIAL

## 2021-07-14 DIAGNOSIS — H10.31 ACUTE BACTERIAL CONJUNCTIVITIS OF RIGHT EYE: Primary | ICD-10-CM

## 2021-07-14 PROCEDURE — 99421 OL DIG E/M SVC 5-10 MIN: CPT | Performed by: NURSE PRACTITIONER

## 2021-07-14 RX ORDER — GENTAMICIN SULFATE 3 MG/ML
1 SOLUTION/ DROPS OPHTHALMIC 4 TIMES DAILY
Qty: 5 ML | Refills: 0 | Status: SHIPPED | OUTPATIENT
Start: 2021-07-14 | End: 2021-07-19

## 2021-07-14 NOTE — PROGRESS NOTES
Thank you for submitting an Evisit. I'm sorry that you aren't feeling well.  Based upon your responses and photo(s), I think that you most likely have bacterial conjunctivitis (pink eye).  I have sent the following prescription(s) to your pharmacy: gentamycin drops. For additional symptom relief, please refer to the supportive care guidelines provided below. Please contact your primary care provider's office if any of the following occur, as they can be signs of a more serious eye problem:  · You have pain in your eye(s), not just irritation on the surface  · Your eye(s) become extremely light sensitive  · You have a change in vision or loss of vision  · You have an increase in discharge from the eye(s)  · Your eye(s) has not started to improve or begins to get worse within 48 hours after you start using antibiotics  · Pinkeye lasts longer than 7 days    Sincerely,  SHLOMO Wu - CNP    Pinkeye: Care Instructions    With pinkeye, the lining of your eyelid and the eye surface become red and swollen. The lining is called the conjunctiva. Pinkeye is also called conjunctivitis. Pinkeye can be caused by bacteria, a virus, or an allergy. How can you care for yourself at home? · Use moist cotton or a clean, wet cloth to remove the crust from your eyes. Wipe from the inside corner of your eye to the outside. Use a clean part of the cloth for each wipe. · Close your eyes and put cold or warm wet cloths on them a few times a day if your eyes hurt or are itching. · Do not wear contact lenses until your pinkeye is gone. Clean the contacts and storage case. · If you wear disposable contacts, get out a new pair when your eyes have cleared and it is safe to wear contacts again. · Do not wear eye make-up until your pinkeye is gone, then you should replace your old eye make-up. Prevent pinkeye from spreading  · Wash your hands often.  Always wash them before and after you treat pinkeye or touch your eyes or face.  · Don't share towels, pillows, or washcloths while you have pinkeye. Use clean linens, towels, and washcloths each day. · Do not share your contact lens equipment, containers, or solutions. · Do not share your eye medicine. If your pinkeye is caused by bacteria   · This type of pinkeye can spread quickly from person to person, usually from touching your eye(s), then other surfaces. · Pinkeye from bacteria usually clears up 2 to 3 days after you start treatment with antibiotic eyedrops or ointment. · If the doctor gave you antibiotic medicine, such as an ointment or eyedrops, use it as directed. Do not stop using it just because your eyes start to look better. You need to take the full course of antibiotics. Keep the bottle tip clean. · To put in eyedrops or ointment:  · Tilt your head back and pull your lower eyelid down with one finger. · Drop or squirt the medicine inside the lower lid. · Close your eye for 30 to 60 seconds to let the drops or ointment move around. · Do not touch the tip of the bottle or tube to your eye, eyelid, eyelashes, or any other surface. © 8614-3311 [employer]. Care instructions adapted under license by [facility] ([facility]). If you have questions about a medical condition or this instruction, always ask your healthcare professional. [place] [address] any warranty or liability for your use of this information.

## 2021-07-30 ENCOUNTER — OFFICE VISIT (OUTPATIENT)
Dept: BEHAVIORAL/MENTAL HEALTH CLINIC | Age: 26
End: 2021-07-30
Payer: COMMERCIAL

## 2021-07-30 DIAGNOSIS — F41.1 GENERALIZED ANXIETY DISORDER: ICD-10-CM

## 2021-07-30 DIAGNOSIS — Z63.0 MARITAL STRESS: ICD-10-CM

## 2021-07-30 DIAGNOSIS — F33.41 RECURRENT MAJOR DEPRESSIVE DISORDER, IN PARTIAL REMISSION (HCC): Primary | ICD-10-CM

## 2021-07-30 PROCEDURE — 90834 PSYTX W PT 45 MINUTES: CPT | Performed by: COUNSELOR

## 2021-07-30 SDOH — SOCIAL STABILITY - SOCIAL INSECURITY: PROBLEMS IN RELATIONSHIP WITH SPOUSE OR PARTNER: Z63.0

## 2021-07-30 NOTE — PROGRESS NOTES
[address] [facility]    Time Start: 3:57pm   Time End: 4:49pm  Date of Service:  7/30/2021    Subjective:  Odalys shared that she has experienced \"improved mood\" since last session on account of business doing better, her 's improved communication, and getting more social opportunities in her life. Though she describes things as \"improved,\" she admits things still aren't where she'd like them to be. 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 She continues to have concerns about what she considers ADHD symptoms, and requested assessment next session. Clinician will follow-up. Odalys communicated that she still struggles with infertility, as it relates to her 's low sperm count. She feels the issue is beyond his diet and marijuana use, and looks forward to future imaging testing that he is scheduled to receive, to learn more information about options for family planning. Objective:  Mental Status:  Appearance: age appropriate, casually dressed and within normal Limits   Affect:  consistent with expectations based upon mood   Attitude: Cooperative and Engageable   Mood:   Apathetic and Anxious   Thought Process:  Linear and goal directed   Delusions: no evidence of delusions   Perceptions: No perceptual disturbance   Behavior:   open and cooperative   Psychomotor: Within normal limits   Speech:    Within normal limits   Eye Contact: good   Orientation:  oriented to person, place, time, and general circumstances   Judgment & Insight:  normal insight and judgment     Risk Assessment:  Current Suicide Risk:  no suicidal ideation, nonsuicidal morbid ideation  Current Homicide Risk:  no homocidal ideation    Diagnosis:   Diagnosis Orders 1. Recurrent major depressive disorder, in partial remission ([facility] [address]     2. Generalized anxiety disorder     3. Marital stress         Plan/Recommendations:  Continue clinical therapy treatment using evidence-based techniques to address collaboratively identified goals, including creating and following a daily schedule, process and address challenges regarding infertility, empower self-esteem and confidence-building, dispute irrational thoughts, and insight development into decreasing symptoms of depression and anxiety.  Continue use of Cognitive Behavioral therapy to challenge irrational thoughts, Psychoanalysis for insight development, Existential Therapy for acceptance and motivation, and Dialectical Behavioral Therapy to address emotional management.     Electronically signed by Juanita Richardson Ed.D., LPCC-S  Licensed Professional Clinical Counselor  Director of Behavioral Medicine  Family and [facility] Medicine Residency Program at [facility]

## 2021-09-13 ENCOUNTER — OFFICE VISIT (OUTPATIENT)
Dept: BEHAVIORAL/MENTAL HEALTH CLINIC | Age: 26
End: 2021-09-13
Payer: COMMERCIAL

## 2021-09-13 DIAGNOSIS — F41.1 GENERALIZED ANXIETY DISORDER: ICD-10-CM

## 2021-09-13 DIAGNOSIS — F33.41 RECURRENT MAJOR DEPRESSIVE DISORDER, IN PARTIAL REMISSION (HCC): Primary | ICD-10-CM

## 2021-09-13 DIAGNOSIS — Z63.0 MARITAL STRESS: ICD-10-CM

## 2021-09-13 PROCEDURE — 90837 PSYTX W PT 60 MINUTES: CPT | Performed by: COUNSELOR

## 2021-09-13 SDOH — SOCIAL STABILITY - SOCIAL INSECURITY: PROBLEMS IN RELATIONSHIP WITH SPOUSE OR PARTNER: Z63.0

## 2021-09-13 NOTE — PROGRESS NOTES
1000 [facility]    Time Start: 9:31am    Time End: 10:30am  Date of Service:  9/13/2021    Subjective:  Liz Bhat reports that a recent vacation to [place] with various friends and family has left her feeling an improved mood, focus, and confidence. 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 Her guilt was further perpetuated when she arrived back home from vacation and Lesa Herzog made frequent comments about how challenging it was without her help, and that he wish she knew more how to handle the farm so he could have a vacation. Significant communication issues remain in the marriage, according to Liz Bhat. She feels Mathew's need to have a vacation, and inability to take one (his choice), leaves her managing his ongoing moods and poor coping strategies, which include his increase in marijuana use, and increased alcohol use. Clinician provided a role-play scenario for Odalys to communicate an effective plan for supporting Mathew's need to schedule a vacation (cross-training was brought up), as well as her communication regarding issues related to his substance abuse. Odalys presented as motivated to have the vacation planning conversation, and contemplative in her motivation to discuss his substance use. Odalys requests an ADHD assessment next visit.     Objective:    PHQ Scores 1/13/2021 10/7/2020 8/26/2020   PHQ2 Score 6 2 3   PHQ9 Score 24 7 8     Interpretation of Total Score Depression Severity: 1-4 = Minimal depression, 5-9 = Mild depression, 10-14 = Moderate depression, 15-19 = Moderately severe depression, 20-27 = Severe depression    Mental Status:  Appearance: age appropriate, casually dressed and within normal Limits   Affect:  consistent with expectations based upon mood   Attitude: Cooperative and Engageable   Mood:   Dysphoric and Apathetic   Thought Process:  Linear and goal directed   Delusions: no evidence of delusions   Perceptions: No perceptual disturbance   Behavior:   open, cooperative and tearful   Psychomotor: Within normal limits   Speech: Within normal limits   Eye Contact: good   Orientation:  oriented to person, place, time, and general circumstances   Judgment & Insight:  normal insight and judgment     Risk Assessment:  Current Suicide Risk:  no suicidal ideation, nonsuicidal morbid ideation  Current Homicide Risk:  no homocidal ideation    Diagnosis:   Diagnosis Orders   1. Recurrent major depressive disorder, in partial remission ([facility] [address]     2. Generalized anxiety disorder     3. Marital stress         Plan/Recommendations:  Continue clinical therapy treatment using evidence-based techniques to address collaboratively identified goals, including creating and following a daily schedule, process and address challenges regarding infertility, empower self-esteem and confidence-building, dispute irrational thoughts, and insight development into decreasing symptoms of depression and anxiety.  Continue use of Cognitive Behavioral therapy to challenge irrational thoughts, Psychoanalysis for insight development, Existential Therapy for acceptance and motivation, and Dialectical Behavioral Therapy to address emotional management.     Electronically signed by Celia Kerr Ed.D., LPCC-S  Licensed Professional Clinical Counselor  Director of Behavioral Medicine  Family and [facility] Medicine Residency Program at [facility]

## 2021-09-14 ENCOUNTER — OFFICE VISIT (OUTPATIENT)
Dept: PRIMARY CARE CLINIC | Age: 26
End: 2021-09-14
Payer: COMMERCIAL

## 2021-09-14 VITALS
BODY MASS INDEX: 24.09 KG/M2 | WEIGHT: 136 LBS | OXYGEN SATURATION: 98 % | SYSTOLIC BLOOD PRESSURE: 118 MMHG | DIASTOLIC BLOOD PRESSURE: 74 MMHG | HEART RATE: 76 BPM

## 2021-09-14 DIAGNOSIS — J30.89 ENVIRONMENTAL AND SEASONAL ALLERGIES: Primary | ICD-10-CM

## 2021-09-14 DIAGNOSIS — K59.00 CONSTIPATION, UNSPECIFIED CONSTIPATION TYPE: ICD-10-CM

## 2021-09-14 DIAGNOSIS — Z00.00 HEALTHCARE MAINTENANCE: ICD-10-CM

## 2021-09-14 PROBLEM — N92.6 IRREGULAR MENSES: Status: RESOLVED | Noted: 2020-08-26 | Resolved: 2021-09-14

## 2021-09-14 PROCEDURE — 99213 OFFICE O/P EST LOW 20 MIN: CPT | Performed by: STUDENT IN AN ORGANIZED HEALTH CARE EDUCATION/TRAINING PROGRAM

## 2021-09-14 RX ORDER — MONTELUKAST SODIUM 10 MG/1
10 TABLET ORAL DAILY
Qty: 30 TABLET | Refills: 3 | Status: SHIPPED | OUTPATIENT
Start: 2021-09-14 | End: 2022-05-18 | Stop reason: SDUPTHER

## 2021-09-14 RX ORDER — FLUTICASONE PROPIONATE 50 MCG
2 SPRAY, SUSPENSION (ML) NASAL DAILY
Qty: 48 G | Refills: 3 | Status: SHIPPED | OUTPATIENT
Start: 2021-09-14 | End: 2022-01-18

## 2021-09-14 RX ORDER — FEXOFENADINE HCL 180 MG/1
180 TABLET ORAL DAILY
Qty: 30 TABLET | Refills: 3 | Status: SHIPPED | OUTPATIENT
Start: 2021-09-14 | End: 2022-01-12

## 2021-09-14 RX ORDER — OLOPATADINE HYDROCHLORIDE 1 MG/ML
1 SOLUTION/ DROPS OPHTHALMIC 2 TIMES DAILY
Qty: 1 EACH | Refills: 3 | Status: SHIPPED | OUTPATIENT
Start: 2021-09-14 | End: 2021-10-14

## 2021-09-14 ASSESSMENT — ENCOUNTER SYMPTOMS
EYE PAIN: 0
EYE REDNESS: 0
EYE ITCHING: 1
FACIAL SWELLING: 0
RHINORRHEA: 1
WHEEZING: 0
SHORTNESS OF BREATH: 0
STRIDOR: 0
COUGH: 1
EYE DISCHARGE: 1
PHOTOPHOBIA: 0
CONSTIPATION: 1

## 2021-09-14 NOTE — PROGRESS NOTES
having a HEPA filter due to the nature of her work (). Denied swelling of the throat and fevers. She reports having constipation in the last 2 weeks and only going to the bathroom 3 times per week. Her diet is low in fiber. Denies any blood in her stool or in her urine. Patient Active Problem List   Diagnosis    Anxiety and depression    Environmental and seasonal allergies       Past Medical History:    Past Medical History:   Diagnosis Date    Anxiety     Depression     Environmental and seasonal allergies 2017    Headache     Irregular menses 08/26/2020    Sprain of anterior talofibular ligament of right ankle        Past Surgical History:  Past Surgical History:   Procedure Laterality Date    WISDOM TOOTH EXTRACTION         Home Meds:  Prior to Visit Medications    Medication Sig Taking? Authorizing Provider   fexofenadine (ALLEGRA) 180 MG tablet Take 1 tablet by mouth daily Yes Mercy Yap MD   fluticasone (FLONASE) 50 MCG/ACT nasal spray 2 sprays by Each Nostril route daily Yes Mercy Yap MD   montelukast (SINGULAIR) 10 MG tablet Take 1 tablet by mouth daily Yes Mercy Yap MD   olopatadine (PATANOL) 0.1 % ophthalmic solution Place 1 drop into both eyes 2 times daily Please dispense a bottle per month. Yes Mercy Yap MD   citalopram (CELEXA) 20 MG tablet Take 1 tablet by mouth daily Yes Federica Dale MD       Allergies:    Patient has no known allergies.     Family History:       Problem Relation Age of Onset    No Known Problems Mother     Kidney stones Father     Collagen Disease Father         diverticulitis       Health Maintenance Completed:  Health Maintenance   Topic Date Due    Hepatitis C screen  Never done    HIV screen  Never done    DTaP/Tdap/Td vaccine (1 - Tdap) Never done    Flu vaccine (1) Never done    Pap smear  04/02/2022    COVID-19 Vaccine  Completed    Hepatitis A vaccine Aged Out    Hepatitis B vaccine  Aged Out    Hib vaccine  Aged Out    Meningococcal (ACWY) vaccine  Aged Out    Pneumococcal 0-64 years Vaccine  Aged Out    Varicella vaccine  Discontinued          Immunization History   Administered Date(s) Administered    COVID-19, J&J, PF, 0.5 mL 04/09/2021       Review of Systems:  Review of Systems   Constitutional: Negative. HENT: Positive for congestion, ear pain and rhinorrhea. Negative for dental problem, facial swelling, hearing loss and postnasal drip. Eyes: Positive for discharge and itching. Negative for photophobia, pain, redness and visual disturbance. Respiratory: Positive for cough. Negative for shortness of breath, wheezing and stridor. Cardiovascular: Negative. Gastrointestinal: Positive for constipation. Genitourinary: Negative. Musculoskeletal: Negative. Skin: Negative. Allergic/Immunologic: Positive for environmental allergies. Neurological: Negative. Psychiatric/Behavioral: Negative. Physical Exam:   Vitals:    09/14/21 0923   BP: 118/74   Pulse: 76   SpO2: 98%   Weight: 136 lb (61.7 kg)     Body mass index is 24.09 kg/m². Wt Readings from Last 3 Encounters:   09/14/21 136 lb (61.7 kg)   02/24/21 135 lb (61.2 kg)   01/12/21 137 lb 12.8 oz (62.5 kg)       BP Readings from Last 3 Encounters:   09/14/21 118/74   02/24/21 114/65   01/12/21 118/84       Physical Exam  Constitutional:       Appearance: Normal appearance. She is normal weight. HENT:      Head: Normocephalic and atraumatic. Nose: Nose normal.      Mouth/Throat:      Mouth: Mucous membranes are moist.      Pharynx: Oropharynx is clear. Eyes:      Extraocular Movements: Extraocular movements intact. Conjunctiva/sclera: Conjunctivae normal.      Pupils: Pupils are equal, round, and reactive to light. Cardiovascular:      Rate and Rhythm: Normal rate and regular rhythm.    Pulmonary:      Effort: Pulmonary effort is normal.      Breath sounds: Normal breath sounds. Abdominal:      General: Abdomen is flat. Bowel sounds are normal.      Palpations: Abdomen is soft. Musculoskeletal:         General: Normal range of motion. Cervical back: Normal range of motion. Skin:     General: Skin is warm and dry. Neurological:      General: No focal deficit present. Mental Status: She is alert and oriented to person, place, and time. Psychiatric:         Mood and Affect: Mood normal.         Behavior: Behavior normal.         Thought Content: Thought content normal.         Judgment: Judgment normal.          Lab Review:   not applicable     Assessment/Plan:   Diagnosis Orders   1. Environmental and seasonal allergies     2. Constipation, unspecified constipation type     Laurel Maria is a 31 y.o. female who presents to the practice today for environmental and seasonal allergies. She has a past medical history of anxiety and depression. Today we are obtaining her permission to request the medical records from her PCP in [place] and her OB/GYN in [place]. 1. Environmental and seasonal allergies  -Not at goal, poorly controlled  -Counseled on using a [place] pot  -Counseled on staggering her medications so if she feels they are worn off by certain time, she has a backup to help   -Continue using HEPA filter  -Continue fexofenadine 100 mg daily  -Continue montelukast 10 mg daily  -Ordered olopatdine drops 2x daily   -Ordered Flonase 2 sprays in each nostril daily  -Use [place] pot  -Referral for immunology if new treatment regimen does not improve  -Return to clinic in 4 weeks for monitoring symptoms    2. Constipation  -Not at goal, patient reports going to the restroom 3 times a week  -Counseled on eating more green leafy vegetables and including more fiber in her diet  -If diet change does not help, we will try Metamucil  -Return to clinic in 4 weeks for constipation    3. Healthcare maintenance   -Not at goal  -Counseled patient on receiving flu and Tdap vaccine. She will make a nurse visit for this  -Declined HIV and hepatitis C screen at this time because she believes she had previously obtained it in [place]  -Patient is filling out medical history request forms for her primary care doctor and OB/GYN  -Consider ordering hepatitis C and HIV screen if not obtained in [place]  -Return to clinic for nurse visit to receive vaccines    Health Maintenance Due:  Health Maintenance Due   Topic Date Due    Hepatitis C screen  Never done    HIV screen  Never done    DTaP/Tdap/Td vaccine (1 - Tdap) Never done    Flu vaccine (1) Never done        Health Care Decision Maker:  Does not meet age criteria    Health Maintenance: (USPSTF Recommendations)  (F) Breast Cancer Screen: (40-49 (C), 50-74 biennial screening mammogram (B)): Not indicated at this time   (F) Cervical Cancer Screen: (21-29 q3yr cytology alone; 30-65 q3yr cytology alone, q5yr with hrHPV alone, or q5yr cytology+hrHPV (A)): Recently completed with her obgyn and will obtain records   CRC/Colonoscopy Screening: (adults 39-53 (B), 50-75 (A)):  Lung Ca Screening: Annual LDCT (+smoker age 49-80, smoked within 15 years, total of 20 pack yr history (B)): Not indicated at this time   DEXA Screen: (women >65 and older, <65 if at risk/postmenopausal (B)): Not indicated at this time   HIV Screen: (15-65 yr old, and all pregnant patients (A)): Did not order because patient believes she completed in [place], obtaining records today   Hep C Screen: (21-70 yr old (B)): Did not order because patient believes she completed in [facility], obtaining records today   [address]: (all pts with cirrhosis and high risk Hep B (US q6 mo)):  Immunizations:  -Will come for a nurse visit to receive Tdap and flu    RTC:  Return in about 4 weeks (around 10/12/2021). EMR Dragon/transcription disclaimer:  Much of this encounter note is electronic transcription/translation of spoken language to printed texts.   The electronic translation of spoken language may be erroneous, or at times, nonsensical words or phrases may be inadvertently transcribed.   Although I have reviewed the note for such errors, some may still exist.     Vani Edwards MD  PGY-1 Resident  [facility]

## 2021-09-14 NOTE — PATIENT INSTRUCTIONS
What is a neti pot? And why would you use one? Answer From Kane Mark M.D., Ph.D.  Madison Perry pot  A neti pot is a container designed to rinse debris or mucus from your nasal cavity. You might use a neti pot to treat symptoms of nasal allergies, sinus problems or colds. If you choose to make your own saltwater solution, it's important to use bottled water that has been distilled or sterilized. Tap water is acceptable if it's been passed through a filter with a pore size of 1 micron or smaller or if it's been boiled for several minutes and then left to cool until it's lukewarm. To use the neti pot, tilt your head sideways over the sink and place the spout of the neti pot in the upper nostril. Breathing through your open mouth, gently pour the saltwater solution into your upper nostril so that the liquid drains through the lower nostril. Repeat on the other side. Be sure to rinse the irrigation device after each use with similarly distilled, sterile, previously boiled and cooled, or filtered water and leave open to air-dry. Neti pots are often available in pharmacies, health food stores and online. Other devices, such as squeeze bottles and pressurized canisters, also can be used to rinse or irrigate the nasal passages. Talk to your doctor to see if nasal rinsing is right for you.

## 2021-10-11 ENCOUNTER — OFFICE VISIT (OUTPATIENT)
Dept: BEHAVIORAL/MENTAL HEALTH CLINIC | Age: 26
End: 2021-10-11
Payer: COMMERCIAL

## 2021-10-11 DIAGNOSIS — F41.1 GENERALIZED ANXIETY DISORDER: ICD-10-CM

## 2021-10-11 DIAGNOSIS — Z63.0 MARITAL STRESS: ICD-10-CM

## 2021-10-11 DIAGNOSIS — F33.41 RECURRENT MAJOR DEPRESSIVE DISORDER, IN PARTIAL REMISSION (HCC): Primary | ICD-10-CM

## 2021-10-11 PROCEDURE — 90834 PSYTX W PT 45 MINUTES: CPT | Performed by: COUNSELOR

## 2021-10-11 SDOH — SOCIAL STABILITY - SOCIAL INSECURITY: PROBLEMS IN RELATIONSHIP WITH SPOUSE OR PARTNER: Z63.0

## 2021-10-11 NOTE — PROGRESS NOTES
[facility] Medicine    Time Start: 9:30am   Time End: 10:20am  Date of Service:  10/11/2021    Subjective:  Odalys reports her mood has been \"good, lately. \" She attributes this improvement on account of having experienced a successful visit with her family in [place] last week, where she visited her parents for 2 days, her sister for 2 days, and her brother for 1 day. She felt that while her visit was somewhat \"superficial\" with her mom, that no drama made for a \"good trip. \" Upon return from her trip, Jess King reports that she often feels like an \"outsider\" to her family, though she knows she isn't. She reconciled that the physical distance and infrequent visits keeps her \"feeling off\" for a day when she returned. 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 Further, Odalys reports she was successful in communication with her , Claribel Villasenor, about his need for a vacation. He was unwilling at first, but then agreed to hike at the [facility] with a friend for a few days. Odalys wishes he would take more time away, and is enjoying her time taking care of tasks and \"proving to Jose Alfredo\" that she can handle the farm while he is gone somewhere taking care of his own mental health. Additionally, she reports she was able to communicate him about her concern with his increased drinking, and Claribel Villasenor responded with decreasing intake, as he agreed it was increasing. While she remains frustrated that they still \"do not talk\" about Jose Alfredo's infertility \"concern,\" she was pleased that he was willing to sit down and schedule his doctor visits for the remainder of the year.      There have not been any panic attacks or moments of anxiety and depression since last session. Continue to follow-up each session. Objective:  Mental Status:  Appearance: age appropriate, casually dressed, piercings and within normal Limits   Affect:  consistent with expectations based upon mood   Attitude: Cooperative, Robbie Kelsy and Friendly   Mood:   Apathetic   Thought Process:  Linear and goal directed   Delusions: no evidence of delusions   Perceptions: No perceptual disturbance   Behavior:   open, friendly and cooperative   Psychomotor: Within normal limits   Speech: Within normal limits   Eye Contact: good   Orientation:  oriented to person, place, time, and general circumstances   Judgment & Insight:  normal insight and judgment     Risk Assessment:  Current Suicide Risk:  no suicidal ideation, nonsuicidal morbid ideation  Current Homicide Risk:  no homocidal ideation    Diagnosis:   Diagnosis Orders   1. Recurrent major depressive disorder, in partial remission ([facility] [address]     2. Generalized anxiety disorder     3. Marital stress         Plan/Recommendations:  Continue clinical therapy treatment using evidence-based techniques to address collaboratively identified goals, including creating and following a daily schedule, process and address challenges regarding infertility, empower self-esteem and confidence-building, dispute irrational thoughts, and insight development into decreasing symptoms of depression and anxiety.  Continue use of Cognitive Behavioral therapy to challenge irrational thoughts, Psychoanalysis for insight development, Existential Therapy for acceptance and motivation, and Dialectical Behavioral Therapy to address emotional management.         Electronically signed by Juan Jose Johnson Ed.D., [facility] Hospital[facility]S  Licensed Professional Clinical Counselor  Director of Behavioral Medicine  [facility] Medicine Residency Program at [facility]

## 2021-11-08 ENCOUNTER — OFFICE VISIT (OUTPATIENT)
Dept: BEHAVIORAL/MENTAL HEALTH CLINIC | Age: 26
End: 2021-11-08
Payer: COMMERCIAL

## 2021-11-08 DIAGNOSIS — F41.1 GENERALIZED ANXIETY DISORDER: ICD-10-CM

## 2021-11-08 DIAGNOSIS — Z62.820 RELATIONSHIP PROBLEM WITH PARENT: ICD-10-CM

## 2021-11-08 DIAGNOSIS — Z63.0 MARITAL STRESS: ICD-10-CM

## 2021-11-08 DIAGNOSIS — F33.41 RECURRENT MAJOR DEPRESSIVE DISORDER, IN PARTIAL REMISSION (HCC): Primary | ICD-10-CM

## 2021-11-08 PROCEDURE — 99215 OFFICE O/P EST HI 40 MIN: CPT | Performed by: COUNSELOR

## 2021-11-08 SDOH — SOCIAL STABILITY - SOCIAL INSECURITY: PROBLEMS IN RELATIONSHIP WITH SPOUSE OR PARTNER: Z63.0

## 2021-11-22 ENCOUNTER — OFFICE VISIT (OUTPATIENT)
Dept: BEHAVIORAL/MENTAL HEALTH CLINIC | Age: 26
End: 2021-11-22
Payer: COMMERCIAL

## 2021-11-22 DIAGNOSIS — F33.41 RECURRENT MAJOR DEPRESSIVE DISORDER, IN PARTIAL REMISSION (HCC): Primary | ICD-10-CM

## 2021-11-22 DIAGNOSIS — Z63.0 MARITAL STRESS: ICD-10-CM

## 2021-11-22 DIAGNOSIS — Z62.820 PARENT RELATIONSHIP PROBLEM: ICD-10-CM

## 2021-11-22 DIAGNOSIS — F41.1 GENERALIZED ANXIETY DISORDER: ICD-10-CM

## 2021-11-22 PROCEDURE — 90837 PSYTX W PT 60 MINUTES: CPT | Performed by: COUNSELOR

## 2021-11-22 SDOH — SOCIAL STABILITY - SOCIAL INSECURITY: PROBLEMS IN RELATIONSHIP WITH SPOUSE OR PARTNER: Z63.0

## 2021-11-24 NOTE — PROGRESS NOTES
mother when she visits and talks to her on the phone. 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 It is in the preliminary stages of purchase, but Maty Nair is looking forward to things in her future. 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 Their business continues to expand. Last, Maty Nair has struggled with family issues in her , Jose Alfredo's, family, including his father recently developing hyperthermia last week, as well as his brother requiring inpatient mental health services, which Maty Nair believes is due to a TBI which occurred about 10yrs ago. Odalys shared her and Geovany Espinoza continue to openly communicate about these existing issues to better manage their own mental health. Follow-up next session. Objective:  Mental Status:  Appearance: age appropriate, casually dressed and within normal Limits   Affect:  consistent with expectations based upon mood   Attitude: Cooperative and Engageable   Mood:   Apathetic and Anxious   Thought Process:  Linear and goal directed   Delusions: no evidence of delusions   Perceptions: No perceptual disturbance   Behavior:   open and cooperative   Psychomotor: Within normal limits   Speech: Within normal limits   Eye Contact: good   Orientation:  oriented to person, place, time, and general circumstances   Judgment & Insight:  normal insight and judgment     Risk Assessment:  Current Suicide Risk:  no suicidal ideation, nonsuicidal morbid ideation  Current Homicide Risk:  no homocidal ideation    Diagnosis:   Diagnosis Orders   1. Recurrent major depressive disorder, in partial remission ([facility] [address]     2. Generalized anxiety disorder     3. Marital stress     4. Parent relationship problem         Plan/Recommendations:  Continue clinical therapy treatment using evidence-based techniques to address collaboratively identified goals, including creating and following a daily schedule, process and address challenges regarding infertility, empower self-esteem and confidence-building, dispute irrational thoughts, and insight development into decreasing symptoms of depression and anxiety, as well as managing strained relationship with her mother.  Continue use of Cognitive Behavioral therapy to challenge irrational thoughts, Psychoanalysis for insight development, Existential Therapy for acceptance and motivation, and Dialectical Behavioral Therapy to address emotional management.       Electronically signed by Guerrero Youngblood Ed.D., LPCC-S  Licensed Professional Clinical Counselor  Director of Behavioral Medicine  [facility] and [facility] Residency Program at [facility]

## 2021-12-17 ENCOUNTER — HOSPITAL ENCOUNTER (EMERGENCY)
Age: 26
Discharge: HOME | End: 2021-12-17
Payer: COMMERCIAL

## 2021-12-17 VITALS
HEIGHT: 62 IN | OXYGEN SATURATION: 100 % | WEIGHT: 135 LBS | BODY MASS INDEX: 24.84 KG/M2 | TEMPERATURE: 98.8 F | HEART RATE: 81 BPM | DIASTOLIC BLOOD PRESSURE: 79 MMHG | SYSTOLIC BLOOD PRESSURE: 114 MMHG | RESPIRATION RATE: 14 BRPM

## 2021-12-17 DIAGNOSIS — H66.011 ACUTE SUPPURATIVE OTITIS MEDIA OF RIGHT EAR WITH SPONTANEOUS RUPTURE OF TYMPANIC MEMBRANE, RECURRENCE NOT SPECIFIED: ICD-10-CM

## 2021-12-17 DIAGNOSIS — H72.91 PERFORATED EAR DRUM, RIGHT: Primary | ICD-10-CM

## 2021-12-17 PROCEDURE — 6370000000 HC RX 637 (ALT 250 FOR IP): Performed by: NURSE PRACTITIONER

## 2021-12-17 PROCEDURE — 99283 EMERGENCY DEPT VISIT LOW MDM: CPT

## 2021-12-17 RX ORDER — AMOXICILLIN AND CLAVULANATE POTASSIUM 875; 125 MG/1; MG/1
1 TABLET, FILM COATED ORAL 2 TIMES DAILY
Qty: 20 TABLET | Refills: 0 | Status: SHIPPED | OUTPATIENT
Start: 2021-12-17 | End: 2021-12-27

## 2021-12-17 RX ORDER — TRAMADOL HYDROCHLORIDE 50 MG/1
50 TABLET ORAL EVERY 6 HOURS PRN
Qty: 12 TABLET | Refills: 0 | Status: SHIPPED | OUTPATIENT
Start: 2021-12-17 | End: 2021-12-20

## 2021-12-17 RX ORDER — TRAMADOL HYDROCHLORIDE 50 MG/1
50 TABLET ORAL ONCE
Status: COMPLETED | OUTPATIENT
Start: 2021-12-17 | End: 2021-12-17

## 2021-12-17 RX ADMIN — TRAMADOL HYDROCHLORIDE 50 MG: 50 TABLET ORAL at 21:30

## 2021-12-17 ASSESSMENT — PAIN SCALES - GENERAL
PAINLEVEL_OUTOF10: 8
PAINLEVEL_OUTOF10: 8

## 2021-12-17 ASSESSMENT — PAIN DESCRIPTION - DESCRIPTORS: DESCRIPTORS: SHOOTING

## 2021-12-17 ASSESSMENT — PAIN DESCRIPTION - ORIENTATION: ORIENTATION: RIGHT

## 2021-12-17 ASSESSMENT — PAIN DESCRIPTION - FREQUENCY: FREQUENCY: INTERMITTENT

## 2021-12-17 ASSESSMENT — PAIN DESCRIPTION - LOCATION: LOCATION: EAR

## 2021-12-17 ASSESSMENT — PAIN DESCRIPTION - PROGRESSION: CLINICAL_PROGRESSION: GRADUALLY WORSENING

## 2021-12-17 ASSESSMENT — PAIN DESCRIPTION - PAIN TYPE: TYPE: ACUTE PAIN

## 2021-12-17 ASSESSMENT — PAIN DESCRIPTION - ONSET: ONSET: ON-GOING

## 2021-12-18 NOTE — ED PROVIDER NOTES
History Narrative    She is a . She is  and is currently trying to conceive. She has 3 dogs, 2 cats, 3 goats, and 27 chickens. Moved from [email or web address] in April 2020     Social Determinants of Health     Financial Resource Strain:     Difficulty of Paying Living Expenses: Not on file   Food Insecurity:     Worried About Running Out of Food in the Last Year: Not on file    Angie of Food in the Last Year: Not on file   Transportation Needs:     Lack of Transportation (Medical): Not on file    Lack of Transportation (Non-Medical): Not on file   Physical Activity:     Days of Exercise per Week: Not on file    Minutes of Exercise per Session: Not on file   Stress:     Feeling of Stress : Not on file   Social Connections:     Frequency of Communication with Friends and Family: Not on file    Frequency of Social Gatherings with Friends and Family: Not on file    Attends Buddhist Services: Not on file    Active Member of [address] [place] [address] or Organizations: Not on file    Attends Club or Organization Meetings: Not on file    Marital Status: Not on file   Intimate Partner Violence:     Fear of Current or Ex-Partner: Not on file    Emotionally Abused: Not on file    Physically Abused: Not on file    Sexually Abused: Not on file   Housing Stability:     Unable to Pay for Housing in the Last Year: Not on file    Number of [place] in the Last Year: Not on file    Unstable Housing in the Last Year: Not on file     No current facility-administered medications for this encounter. Current Outpatient Medications   Medication Sig Dispense Refill    amoxicillin-clavulanate (AUGMENTIN) 875-125 MG per tablet Take 1 tablet by mouth 2 times daily for 10 days 20 tablet 0    traMADol (ULTRAM) 50 MG tablet Take 1 tablet by mouth every 6 hours as needed for Pain for up to 3 days. Intended supply: 3 days.  Take lowest dose possible to manage pain 12 tablet 0    fexofenadine (ALLEGRA) 180 MG tablet Take 1 tablet by mouth daily 30 tablet 3    fluticasone (FLONASE) 50 MCG/ACT nasal spray 2 sprays by Each Nostril route daily 48 g 3    montelukast (SINGULAIR) 10 MG tablet Take 1 tablet by mouth daily 30 tablet 3    citalopram (CELEXA) 20 MG tablet Take 1 tablet by mouth daily 90 tablet 3     No Known Allergies    REVIEW OF SYSTEMS  6 systems reviewed, pertinent positives per HPI otherwise noted to be negative    PHYSICAL EXAM  /79   Pulse 81   Temp 98.8 °F (37.1 °C) (Oral)   Resp 14   Ht 5' 2\" (1.575 m)   Wt 135 lb (61.2 kg)   LMP 12/03/2021   SpO2 100%   BMI 24.69 kg/m²   GENERAL APPEARANCE: Awake and alert. Cooperative. No acute distress. HEAD: Normocephalic. Atraumatic. EYES: PERRL. EOM's grossly intact. ENT: Mucous membranes are moist.  Right otitis media perforated with drainage and erythema. Left otitis media intact no bulging, erythema, effusion, perforation. NECK: Supple. Normal ROM. CHEST: Equal symmetric chest rise. LUNGS: Breathing is unlabored. Speaking comfortably in full sentences. Abdomen: Nondistended  EXTREMITIES: MAEE. No acute deformities. SKIN: Warm and dry. NEUROLOGICAL: Alert and oriented. Strength is 5/5 in all extremities and sensation is intact. ED COURSE/MDM  Patient seen and evaluated. Old records reviewed. Diagnostic testing reviewed and results discussed. I have independently evaluated this patient based upon my scope of practice. Supervising physician was in the department as needed for consultation. Lebron Wiley presented to the ED today with above noted complaints. Patient's antibiotic changed to Augmentin we discussed keeping area dry, patient provided with ENT for follow-up for perforated eardrum. Patient received Reamadol for pain, with good relief.      While in ED patient received   Medications   traMADol (ULTRAM) tablet 50 mg (50 mg Oral Given 12/17/21 2130)       At this point I do not feel the patient requires further work up and it is reasonable to discharge the patient. A discussion was had with the patient and/or their surrogate regarding diagnosis, diagnostic testing results, treatment/ plan of care, and follow up. There was shared decision-making between myself as well as the patient and/or their surrogate and we are all in agreement with discharge home. There was an opportunity for questions and all questions were answered to the best of my ability and to the satisfaction of the patient and/or patient family. Patient will follow up with ENT for further evaluation/treatment. The patient was given strict return precautions as we discussed symptoms that would necessitate return to the ED. Patient will return to ED for new/worsening symptoms. The patient verbalized their understanding and agreement with the above plan. Please refer to AVS for further details regarding discharge instructions. No results found for this visit on 12/17/21. I estimate there is LOW risk for EPIGLOTTITIS, PNEUMONIA, MENINGITIS, OR URINARY TRACT INFECTION, thus I consider the discharge disposition reasonable. Also, there is no evidence or peritonitis, sepsis, or toxicity. Beto Aiken and I have discussed the diagnosis and risks, and we agree with discharging home to follow-up with their primary doctor. We also discussed returning to the Emergency Department immediately if new or worsening symptoms occur. We have discussed the symptoms which are most concerning (e.g., changing or worsening pain, trouble swallowing or breating, neck stiffness, fever) that necessitate immediate return. Final Impression    1. Perforated ear drum, right    2. Acute suppurative otitis media of right ear with spontaneous rupture of tympanic membrane, recurrence not specified        Discharge Vital Signs:  Blood pressure 114/79, pulse 81, temperature 98.8 °F (37.1 °C), temperature source Oral, resp.  rate 14, height 5' 2\" (1.575 m), weight 135 lb (61.2 kg), last menstrual period 12/03/2021, SpO2 100 %. mdm    Patient was sent home with a prescription for below medication/s. I did [place] patient on appropriate use of these medication. Discharge Medication List as of 12/17/2021  9:34 PM      START taking these medications    Details   amoxicillin-clavulanate (AUGMENTIN) 875-125 MG per tablet Take 1 tablet by mouth 2 times daily for 10 days, Disp-20 tablet, R-0Print      traMADol (ULTRAM) 50 MG tablet Take 1 tablet by mouth every 6 hours as needed for Pain for up to 3 days. Intended supply: 3 days. Take lowest dose possible to manage pain, Disp-12 tablet, R-0Print           FOLLOW UP  Elkin Gonzales DO  [facility] [address] [address] [phone]          DISPOSITION  Patient was discharged to home in good condition. Comment: Please note this report has been produced using speech recognition software and may contain errors related to that system including errors in grammar, punctuation, and spelling, as well as words and phrases that may be inappropriate. If there are any questions or concerns please feel free to contact the dictating provider for clarification.         Mikel Washington, SHLOMO - CNP  12/17/21 0335

## 2022-01-04 ENCOUNTER — OFFICE VISIT (OUTPATIENT)
Dept: ENT CLINIC | Age: 27
End: 2022-01-04
Payer: COMMERCIAL

## 2022-01-04 VITALS
BODY MASS INDEX: 25.21 KG/M2 | WEIGHT: 137 LBS | DIASTOLIC BLOOD PRESSURE: 54 MMHG | HEIGHT: 62 IN | TEMPERATURE: 97.3 F | SYSTOLIC BLOOD PRESSURE: 107 MMHG

## 2022-01-04 DIAGNOSIS — H66.011 NON-RECURRENT ACUTE SUPPURATIVE OTITIS MEDIA OF RIGHT EAR WITH SPONTANEOUS RUPTURE OF TYMPANIC MEMBRANE: Primary | ICD-10-CM

## 2022-01-04 DIAGNOSIS — H91.91 HEARING LOSS OF RIGHT EAR, UNSPECIFIED HEARING LOSS TYPE: ICD-10-CM

## 2022-01-04 PROCEDURE — 99204 OFFICE O/P NEW MOD 45 MIN: CPT | Performed by: STUDENT IN AN ORGANIZED HEALTH CARE EDUCATION/TRAINING PROGRAM

## 2022-01-04 PROCEDURE — 92504 EAR MICROSCOPY EXAMINATION: CPT | Performed by: STUDENT IN AN ORGANIZED HEALTH CARE EDUCATION/TRAINING PROGRAM

## 2022-01-04 RX ORDER — AMOXICILLIN 875 MG/1
TABLET, COATED ORAL
COMMUNITY
Start: 2021-12-15 | End: 2022-01-18

## 2022-01-04 ASSESSMENT — ENCOUNTER SYMPTOMS
COUGH: 0
RHINORRHEA: 0
EYE PAIN: 0
SHORTNESS OF BREATH: 0
NAUSEA: 0
VOMITING: 0

## 2022-01-04 NOTE — PROGRESS NOTES
Sue      Patient Name: Kevin Travis  Medical Record Number:  [id number]  Primary Care Physician:  Ashley Glaser. [address]   Date of Consultation: 1/4/2022    Chief Complaint:   Chief Complaint   Patient presents with    New Patient     Patient states she has a ruptured right eardrum with drainage        HISTORY OF PRESENT ILLNESS  Luther Villasenor is a(n) 31 y.o. female who presents for evaluation of right-sided eardrum rupture with drainage and hearing loss. She states that she developed a right-sided ear infection and was started on amoxicillin by the urgent care. She then had worsening of her ear pain and went back to the emergency room and was prescribed Augmentin. The Augmentin began to work however she had a rupture of her eardrum with drainage. She then continued to take the amoxicillin after the Augmentin ran out. She feels significantly better today however she continues to have drainage from her right ear and hearing loss. Prior to this most recent encounter she has not had any ear issues. She has never had any ear surgeries. She not have any recent history of imaging. Patient Active Problem List   Diagnosis    Anxiety and depression    Environmental and seasonal allergies     Past Surgical History:   Procedure Laterality Date    WISDOM TOOTH EXTRACTION       Family History   Problem Relation Age of Onset    No Known Problems Mother     Kidney stones Father     Collagen Disease Father         diverticulitis     Social History     Socioeconomic History    Marital status:      Spouse name: Lisseth Ruiz Number of children: Not on file    Years of education: Not on file    Highest education level:  Bachelor's degree (e.g., BA, AB, BS)   Occupational History    Occupation: farmer   Tobacco Use    Smoking status: Never Smoker    Smokeless tobacco: Never Used   Vaping Use    Vaping Use: Never used   Substance and Sexual Activity    Alcohol use: Yes     Alcohol/week: 6.0 standard drinks     Types: 3 Glasses of wine, 3 Cans of beer per week    Drug use: Never    Sexual activity: Yes     Partners: Male   Other Topics Concern    Not on file   Social History Narrative    She is a . She is  and is currently trying to conceive. She has 3 dogs, 2 cats, 3 goats, and 27 chickens. Moved from [address] [employer] in April 2020     Social Determinants of Health     Financial Resource Strain:     Difficulty of Paying Living Expenses: Not on file   Food Insecurity:     Worried About Running Out of Food in the Last Year: Not on file    Angie of Food in the Last Year: Not on file   Transportation Needs:     Lack of Transportation (Medical): Not on file    Lack of Transportation (Non-Medical): Not on file   Physical Activity:     Days of Exercise per Week: Not on file    Minutes of Exercise per Session: Not on file   Stress:     Feeling of Stress : Not on file   Social Connections:     Frequency of Communication with Friends and Family: Not on file    Frequency of Social Gatherings with Friends and Family: Not on file    Attends Buddhism Services: Not on file    Active Member of [address] [place] [address] [employer] or Organizations: Not on file    Attends Club or Organization Meetings: Not on file    Marital Status: Not on file   Intimate Partner Violence:     Fear of Current or Ex-Partner: Not on file    Emotionally Abused: Not on file    Physically Abused: Not on file    Sexually Abused: Not on file   Housing Stability:     Unable to Pay for Housing in the Last Year: Not on file    Number of [place] in the Last Year: Not on file    Unstable Housing in the Last Year: Not on file       DRUG/FOOD ALLERGIES: Patient has no known allergies. CURRENT MEDICATIONS  Prior to Admission medications    Medication Sig Start Date End Date Taking?  Authorizing Provider   amoxicillin (AMOXIL) 875 MG tablet TAKE 1 TABLET BY MOUTH TWICE DAILY FOR 10 DAYS 12/15/21   Historical Provider, MD   fexofenadine (ALLEGRA) 180 MG tablet Take 1 tablet by mouth daily 9/14/21 1/12/22  Franki Disla MD   fluticasone (FLONASE) 50 MCG/ACT nasal spray 2 sprays by Each Nostril route daily 9/14/21   Mercy Yap MD   montelukast (SINGULAIR) 10 MG tablet Take 1 tablet by mouth daily 9/14/21   Franki Disla MD   citalopram (CELEXA) 20 MG tablet Take 1 tablet by mouth daily 1/14/21   Norm Glynn MD       REVIEW OF SYSTEMS  The following systems were reviewed and revealed the following in addition to any already discussed in the HPI:    Review of Systems   Constitutional: Negative for fatigue and fever. HENT: Positive for ear discharge, ear pain and hearing loss. Negative for congestion, postnasal drip, rhinorrhea and sneezing. Eyes: Negative for pain and visual disturbance. Respiratory: Negative for cough and shortness of breath. Cardiovascular: Negative for chest pain. Gastrointestinal: Negative for nausea and vomiting. Endocrine: Negative. Genitourinary: Negative. Musculoskeletal: Negative for neck pain and neck stiffness. Skin: Negative for rash. Neurological: Negative for dizziness and headaches.           PHYSICAL EXAM  BP (!) 107/54 (Site: Right Upper Arm, Position: Sitting)   Temp 97.3 °F (36.3 °C)   Ht 5' 2\" (1.575 m)   Wt 137 lb (62.1 kg)   BMI 25.06 kg/m²     GENERAL: No Acute Distress, Alert and Oriented, no hoarseness  EYES: EOMI, Anti-icteric  NOSE: No epistaxis, nasal mucosa within normal limits, no purulent drainage  EARS: Normal external canal appearance, EAC patent on left and TM intact without evidence of effusion, EAC with purulence on the right  FACE: 1/6 House-Brackmann Scale, symmetric, sensation equal bilaterally  ORAL CAVITY: No masses or lesions palpated, uvula is midline, moist mucous membranes,   NECK: Normal range of motion, no thyromegaly, trachea is midline, no lymphadenopathy, no neck masses, no crepitus  CHEST: Normal respiratory effort, no retractions, breathing comfortably  SKIN: No rashes, normal appearing skin, no evidence of skin lesions/tumors    RADIOLOGY  Summary of findings:      PROCEDURE  Otomicroscopy and Debris Removal    An operating microscope was utilized to visualize the external auditory canals using a speculum. The external auditory canals where occluded with debris on the right. The debris was removed with instrumentation under microscopic evaluation. The right tympanic membrane is covered in purulence with suspected perforation       ASSESSMENT/PLAN  Clarice Durham is a very pleasant 31 y.o. female with    1. Non-recurrent acute suppurative otitis media of right ear with spontaneous rupture of tympanic membrane  Advil samples were given to the patient to treat topically. She will continue current amoxicillin until it has been completed. - [place] EAR MICROSCOPY EXAMINATION    2. Hearing loss of right ear, unspecified hearing loss type  Hearing loss is likely secondary to the TM perforation and purulence in her middle ear. We will treat the infection first see how her hearing involved. She will follow-up next week for recheck. Medical Decision Making:   The following items were considered in medical decision making:  Independent review of images  Review / order clinical lab tests  Review / order radiology tests  Decision to obtain old records

## 2022-01-09 DIAGNOSIS — F33.2 SEVERE EPISODE OF RECURRENT MAJOR DEPRESSIVE DISORDER, WITHOUT PSYCHOTIC FEATURES (HCC): ICD-10-CM

## 2022-01-10 ENCOUNTER — TELEPHONE (OUTPATIENT)
Dept: ENT CLINIC | Age: 27
End: 2022-01-10

## 2022-01-10 ENCOUNTER — OFFICE VISIT (OUTPATIENT)
Dept: ENT CLINIC | Age: 27
End: 2022-01-10
Payer: COMMERCIAL

## 2022-01-10 VITALS — HEIGHT: 62 IN | TEMPERATURE: 97.2 F | WEIGHT: 138.6 LBS | BODY MASS INDEX: 25.51 KG/M2

## 2022-01-10 DIAGNOSIS — H91.91 HEARING LOSS OF RIGHT EAR, UNSPECIFIED HEARING LOSS TYPE: ICD-10-CM

## 2022-01-10 DIAGNOSIS — H60.591 OTHER NONINFECTIVE ACUTE OTITIS EXTERNA OF RIGHT EAR: Primary | ICD-10-CM

## 2022-01-10 PROCEDURE — 99214 OFFICE O/P EST MOD 30 MIN: CPT | Performed by: STUDENT IN AN ORGANIZED HEALTH CARE EDUCATION/TRAINING PROGRAM

## 2022-01-10 PROCEDURE — 92504 EAR MICROSCOPY EXAMINATION: CPT | Performed by: STUDENT IN AN ORGANIZED HEALTH CARE EDUCATION/TRAINING PROGRAM

## 2022-01-10 RX ORDER — METHYLPREDNISOLONE 4 MG/1
TABLET ORAL
Qty: 1 KIT | Refills: 0 | Status: SHIPPED | OUTPATIENT
Start: 2022-01-10 | End: 2022-01-16

## 2022-01-10 RX ORDER — CIPROFLOXACIN AND DEXAMETHASONE 3; 1 MG/ML; MG/ML
4 SUSPENSION/ DROPS AURICULAR (OTIC) 2 TIMES DAILY
Qty: 7.5 ML | Refills: 0 | Status: SHIPPED | OUTPATIENT
Start: 2022-01-10 | End: 2022-01-15

## 2022-01-10 RX ORDER — CITALOPRAM 20 MG/1
20 TABLET ORAL DAILY
Qty: 90 TABLET | Refills: 3 | Status: SHIPPED | OUTPATIENT
Start: 2022-01-10 | End: 2022-04-28

## 2022-01-10 ASSESSMENT — ENCOUNTER SYMPTOMS
NAUSEA: 0
EYE PAIN: 0
VOMITING: 0
COUGH: 0
SHORTNESS OF BREATH: 0
RHINORRHEA: 0

## 2022-01-10 NOTE — PROGRESS NOTES
[address]       Patient Name: Ken Celis  Medical Record Number:  [id number]  Primary Care Physician:  eBrnardo Triana. Leatha Villanueva DO  Date of Consultation: 1/10/2022    Chief Complaint:   Chief Complaint   Patient presents with    Follow-up     RT ear has gotten worse, states yesterday had a lot of black stuff that came out of her ear. HISTORY OF PRESENT ILLNESS  Lizz Castañeda is a(n) 31 y.o. female who presents for evaluation of right-sided eardrum rupture with drainage and hearing loss. She states that she developed a right-sided ear infection and was started on amoxicillin by the urgent care. She then had worsening of her ear pain and went back to the emergency room and was prescribed Augmentin. The Augmentin began to work however she had a rupture of her eardrum with drainage. She then continued to take the amoxicillin after the Augmentin ran out. She feels significantly better today however she continues to have drainage from her right ear and hearing loss. Prior to this most recent encounter she has not had any ear issues. She has never had any ear surgeries. She not have any recent history of imaging. Interval History  Has finished her Otovel samples and amoxicillin without significant improvement. Continues to have pain and discharge from the right ear as well as decreased hearing. Patient Active Problem List   Diagnosis    Anxiety and depression    Environmental and seasonal allergies     Past Surgical History:   Procedure Laterality Date    WISDOM TOOTH EXTRACTION       Family History   Problem Relation Age of Onset    No Known Problems Mother     Kidney stones Father     Collagen Disease Father         diverticulitis     Social History     Socioeconomic History    Marital status:      Spouse name: Homer Rivers Number of children: Not on file    Years of education: Not on file    Highest education level:  Bachelor's degree (e.g., BA, AB, BS)   Occupational History    Occupation: farmer   Tobacco Use    Smoking status: Never Smoker    Smokeless tobacco: Never Used   Vaping Use    Vaping Use: Never used   Substance and Sexual Activity    Alcohol use: Yes     Alcohol/week: 6.0 standard drinks     Types: 3 Glasses of wine, 3 Cans of beer per week    Drug use: Never    Sexual activity: Yes     Partners: Male   Other Topics Concern    Not on file   Social History Narrative    She is a . She is  and is currently trying to conceive. She has 3 dogs, 2 cats, 3 goats, and 27 chickens. Moved from [place] in April 2020     Social Determinants of Health     Financial Resource Strain:     Difficulty of Paying Living Expenses: Not on file   Food Insecurity:     Worried About Running Out of Food in the Last Year: Not on file    Angie of Food in the Last Year: Not on file   Transportation Needs:     Lack of Transportation (Medical): Not on file    Lack of Transportation (Non-Medical):  Not on file   Physical Activity:     Days of Exercise per Week: Not on file    Minutes of Exercise per Session: Not on file   Stress:     Feeling of Stress : Not on file   Social Connections:     Frequency of Communication with Friends and Family: Not on file    Frequency of Social Gatherings with Friends and Family: Not on file    Attends Mormon Services: Not on file    Active Member of [address] [place] [address] or Organizations: Not on file    Attends Club or Organization Meetings: Not on file    Marital Status: Not on file   Intimate Partner Violence:     Fear of Current or Ex-Partner: Not on file    Emotionally Abused: Not on file    Physically Abused: Not on file    Sexually Abused: Not on file   Housing Stability:     Unable to Pay for Housing in the Last Year: Not on file    Number of [place] in the Last Year: Not on file    Unstable Housing in the Last Year: Not on file       DRUG/FOOD ALLERGIES: Patient has no known allergies. CURRENT MEDICATIONS  Prior to Admission medications    Medication Sig Start Date End Date Taking? Authorizing Provider   citalopram (CELEXA) 20 MG tablet TAKE 1 TABLET BY MOUTH DAILY 1/10/22  Yes Hodan Munson. Dany Eldridge, DO   ciprofloxacin-dexamethasone (CIPRODEX) 0.3-0.1 % otic suspension Place 4 drops into the left ear 2 times daily for 5 days 1/10/22 1/15/22 Yes Storm Guillermo,    methylPREDNISolone (MEDROL DOSEPACK) 4 MG tablet Take by mouth. 1/10/22 1/16/22 Yes Storm Guillermo,    amoxicillin (AMOXIL) 875 MG tablet TAKE 1 TABLET BY MOUTH TWICE DAILY FOR 10 DAYS 12/15/21  Yes Historical Provider, MD   fexofenadine (ALLEGRA) 180 MG tablet Take 1 tablet by mouth daily 9/14/21 1/12/22 Yes Mercy Yap MD   fluticasone (FLONASE) 50 MCG/ACT nasal spray 2 sprays by Each Nostril route daily 9/14/21  Yes Mercy Yap MD   montelukast (SINGULAIR) 10 MG tablet Take 1 tablet by mouth daily 9/14/21  Yes Mercy Yap MD       REVIEW OF SYSTEMS  The following systems were reviewed and revealed the following in addition to any already discussed in the HPI:    Review of Systems   Constitutional: Negative for fatigue and fever. HENT: Positive for ear discharge, ear pain and hearing loss. Negative for congestion, postnasal drip, rhinorrhea and sneezing. Eyes: Negative for pain and visual disturbance. Respiratory: Negative for cough and shortness of breath. Cardiovascular: Negative for chest pain. Gastrointestinal: Negative for nausea and vomiting. Endocrine: Negative. Genitourinary: Negative. Musculoskeletal: Negative for neck pain and neck stiffness. Skin: Negative for rash. Neurological: Negative for dizziness and headaches.           PHYSICAL EXAM  Temp 97.2 °F (36.2 °C) (Infrared)   Ht 5' 2\" (1.575 m)   Wt 138 lb 9.6 oz (62.9 kg)   BMI 25.35 kg/m²     GENERAL: No Acute Distress, Alert and Oriented, no hoarseness  EYES: EOMI, Anti-icteric  NOSE: No epistaxis, nasal mucosa within normal limits, no purulent drainage  EARS: Normal external canal appearance, EAC patent on left and TM intact without evidence of effusion, EAC with purulence on the right  FACE: 1/6 House-Brackmann Scale, symmetric, sensation equal bilaterally  ORAL CAVITY: No masses or lesions palpated, uvula is midline, moist mucous membranes,   NECK: Normal range of motion, no thyromegaly, trachea is midline, no lymphadenopathy, no neck masses, no crepitus  CHEST: Normal respiratory effort, no retractions, breathing comfortably  SKIN: No rashes, normal appearing skin, no evidence of skin lesions/tumors    RADIOLOGY  Summary of findings:      PROCEDURE  Otomicroscopy and Debris Removal    An operating microscope was utilized to visualize the external auditory canals using a speculum. The external auditory canals where occluded with debris on the right. The debris was removed with instrumentation under microscopic evaluation. The right tympanic membrane is covered in purulence with suspected perforation       ASSESSMENT/PLAN  Kirti Mcghee is a very pleasant 31 y.o. female with     1. Other noninfective acute otitis externa of right ear  Ear was suctioned out underneath microscopic visualization. I will represcribe Ciprodex. If it is expensive she will call and I will switch it to Floxin. - ciprofloxacin-dexamethasone (CIPRODEX) 0.3-0.1 % otic suspension; Place 4 drops into the left ear 2 times daily for 5 days  Dispense: 7.5 mL; Refill: 0  - methylPREDNISolone (MEDROL DOSEPACK) 4 MG tablet; Take by mouth. Dispense: 1 kit; Refill: 0  - [place] EAR MICROSCOPY EXAMINATION    2. Hearing loss of right ear, unspecified hearing loss type  Hearing loss is likely secondary to her otitis externa. We will monitor the hearing and if it does not resolve we will get hearing test.    She will follow-up in 1 week. Medical Decision Making:   The following items were considered in medical decision making:  Independent review of images  Review / order clinical lab tests  Review / order radiology tests  Decision to obtain old records

## 2022-01-10 NOTE — TELEPHONE ENCOUNTER
Call placed to patient to let her know the PA went through and the medication is only $20. Patient expressed understanding.

## 2022-01-10 NOTE — TELEPHONE ENCOUNTER
I received the following in an email just now.   Dear Prior Authorization Staff,  [id number] [employer] updated the outcome for this PA: Favorable  Request Bains: Alhaji ZIEGLER created on: 2022-01-10 12:25:51 -050

## 2022-01-10 NOTE — TELEPHONE ENCOUNTER
Call placed to [facility] in [facility] to see how much the prescription will cost the patient even after the PA per Dr. Nadia Bazzi. Spoke with Adán Bauer, she stated the price is $20 after the PA.

## 2022-01-10 NOTE — TELEPHONE ENCOUNTER
Ciprodex. If it is expensive she will call and I will switch it to Floxin    Patient called to let Dr. Ephraim Teague know her prescription for Ciprodex is too expensive and would like to have Floxin. Patient seen in office today 1/10/22.     Patient pharmacy [facility] [phone]    Patient phone number [phone]

## 2022-01-18 ENCOUNTER — OFFICE VISIT (OUTPATIENT)
Dept: ENT CLINIC | Age: 27
End: 2022-01-18
Payer: COMMERCIAL

## 2022-01-18 VITALS — TEMPERATURE: 97.3 F | HEIGHT: 62 IN | WEIGHT: 138 LBS | BODY MASS INDEX: 25.4 KG/M2

## 2022-01-18 DIAGNOSIS — H60.591 OTHER NONINFECTIVE ACUTE OTITIS EXTERNA OF RIGHT EAR: Primary | ICD-10-CM

## 2022-01-18 DIAGNOSIS — H91.91 HEARING LOSS OF RIGHT EAR, UNSPECIFIED HEARING LOSS TYPE: ICD-10-CM

## 2022-01-18 PROCEDURE — 92504 EAR MICROSCOPY EXAMINATION: CPT | Performed by: STUDENT IN AN ORGANIZED HEALTH CARE EDUCATION/TRAINING PROGRAM

## 2022-01-18 PROCEDURE — 99213 OFFICE O/P EST LOW 20 MIN: CPT | Performed by: STUDENT IN AN ORGANIZED HEALTH CARE EDUCATION/TRAINING PROGRAM

## 2022-01-18 ASSESSMENT — ENCOUNTER SYMPTOMS
SHORTNESS OF BREATH: 0
EYE PAIN: 0
NAUSEA: 0
RHINORRHEA: 0
COUGH: 0
VOMITING: 0

## 2022-01-18 NOTE — PROGRESS NOTES
[facility]      Patient Name: Ankit Ga  Medical Record Number:  [id number]  Primary Care Physician:  Tasha Saxena. Hetal Lau DO  Date of Consultation: 1/18/2022    Chief Complaint:   Chief Complaint   Patient presents with    Follow-up     2 week follow up on her right ear. Patient states he is feeling better, but states her ear feels blocked        HISTORY OF PRESENT ILLNESS  Salas Peterson is a(n) 31 y.o. female who presents for evaluation of right-sided eardrum rupture with drainage and hearing loss. She states that she developed a right-sided ear infection and was started on amoxicillin by the urgent care. She then had worsening of her ear pain and went back to the emergency room and was prescribed Augmentin. The Augmentin began to work however she had a rupture of her eardrum with drainage. She then continued to take the amoxicillin after the Augmentin ran out. She feels significantly better today however she continues to have drainage from her right ear and hearing loss. Prior to this most recent encounter she has not had any ear issues. She has never had any ear surgeries. She not have any recent history of imaging. Interval History 1/10/2022  Has finished her Otovel samples and amoxicillin without significant improvement. Continues to have pain and discharge from the right ear as well as decreased hearing. Interval History 1/18/2022  Pain has significantly improved over the last week. She does feel that the right side is still muffled and that there is something in her ear.     Patient Active Problem List   Diagnosis    Anxiety and depression    Environmental and seasonal allergies     Past Surgical History:   Procedure Laterality Date    WISDOM TOOTH EXTRACTION       Family History   Problem Relation Age of Onset    No Known Problems Mother     Kidney stones Father     Collagen Disease Father         diverticulitis     Social History Socioeconomic History    Marital status:      Spouse name: Terrie Flores Number of children: Not on file    Years of education: Not on file    Highest education level: Bachelor's degree (e.g., BA, AB, BS)   Occupational History    Occupation: farmer   Tobacco Use    Smoking status: Never Smoker    Smokeless tobacco: Never Used   Vaping Use    Vaping Use: Never used   Substance and Sexual Activity    Alcohol use: Yes     Alcohol/week: 6.0 standard drinks     Types: 3 Glasses of wine, 3 Cans of beer per week    Drug use: Never    Sexual activity: Yes     Partners: Male   Other Topics Concern    Not on file   Social History Narrative    She is a . She is  and is currently trying to conceive. She has 3 dogs, 2 cats, 3 goats, and 27 chickens. Moved from [place] in April 2020     Social Determinants of Health     Financial Resource Strain:     Difficulty of Paying Living Expenses: Not on file   Food Insecurity:     Worried About Running Out of Food in the Last Year: Not on file    Angie of Food in the Last Year: Not on file   Transportation Needs:     Lack of Transportation (Medical): Not on file    Lack of Transportation (Non-Medical):  Not on file   Physical Activity:     Days of Exercise per Week: Not on file    Minutes of Exercise per Session: Not on file   Stress:     Feeling of Stress : Not on file   Social Connections:     Frequency of Communication with Friends and Family: Not on file    Frequency of Social Gatherings with Friends and Family: Not on file    Attends Caodaism Services: Not on file    Active Member of Clubs or Organizations: Not on file    Attends Club or Organization Meetings: Not on file    Marital Status: Not on file   Intimate Partner Violence:     Fear of Current or Ex-Partner: Not on file    Emotionally Abused: Not on file    Physically Abused: Not on file    Sexually Abused: Not on file   Housing Stability:  Unable to Pay for Housing in the Last Year: Not on file    Number of Places Lived in the Last Year: Not on file    Unstable Housing in the Last Year: Not on file       DRUG/FOOD ALLERGIES: Patient has no known allergies. CURRENT MEDICATIONS  Prior to Admission medications    Medication Sig Start Date End Date Taking? Authorizing Provider   citalopram (CELEXA) 20 MG tablet TAKE 1 TABLET BY MOUTH DAILY 1/10/22   Donna Damon. Dustin Yates,    montelukast (SINGULAIR) 10 MG tablet Take 1 tablet by mouth daily 9/14/21   Perla Taylor MD       REVIEW OF SYSTEMS  The following systems were reviewed and revealed the following in addition to any already discussed in the HPI:    Review of Systems   Constitutional: Negative for fatigue and fever. HENT: Positive for ear discharge, ear pain and hearing loss. Negative for congestion, postnasal drip, rhinorrhea and sneezing. Eyes: Negative for pain and visual disturbance. Respiratory: Negative for cough and shortness of breath. Cardiovascular: Negative for chest pain. Gastrointestinal: Negative for nausea and vomiting. Endocrine: Negative. Genitourinary: Negative. Musculoskeletal: Negative for neck pain and neck stiffness. Skin: Negative for rash. Neurological: Negative for dizziness and headaches.           PHYSICAL EXAM  Temp 97.3 °F (36.3 °C)   Ht 5' 2\" (1.575 m)   Wt 138 lb (62.6 kg)   BMI 25.24 kg/m²     GENERAL: No Acute Distress, Alert and Oriented, no hoarseness  EYES: EOMI, Anti-icteric  NOSE: No epistaxis, nasal mucosa within normal limits, no purulent drainage  EARS: Normal external canal appearance, EAC patent on left and TM intact without evidence of effusion, EAC with wax and debris from previous infection on eardrum  FACE: 1/6 House-Brackmann Scale, symmetric, sensation equal bilaterally  ORAL CAVITY: No masses or lesions palpated, uvula is midline, moist mucous membranes,   NECK: Normal range of motion, no thyromegaly, trachea is midline, no lymphadenopathy, no neck masses, no crepitus  CHEST: Normal respiratory effort, no retractions, breathing comfortably  SKIN: No rashes, normal appearing skin, no evidence of skin lesions/tumors    RADIOLOGY  Summary of findings:      PROCEDURE  Otomicroscopy and Debris Removal    An operating microscope was utilized to visualize the external auditory canals using a speculum. The external auditory canals where occluded with debris on the right. The debris was removed with instrumentation under microscopic evaluation. The right tympanic membrane is covered in purulence with suspected perforation       ASSESSMENT/PLAN  Tiffanie Odom is a very pleasant 31 y.o. female with    1. Other noninfective acute otitis externa of right ear  Wax and debris removed from the right ear canal.  Patient had resolution of the feeling of something being stuck in her ear. - AL EAR MICROSCOPY EXAMINATION    2. Hearing loss of right ear, unspecified hearing loss type  Hearing loss resolved with removal of debris from the ear    She will monitor her hearing for the next week or so and if she feels it is off we will obtain a hearing test that point time. Otherwise she will follow-up as needed        Medical Decision Making:   The following items were considered in medical decision making:  Independent review of images  Review / order clinical lab tests  Review / order radiology tests  Decision to obtain old records

## 2022-01-31 ENCOUNTER — OFFICE VISIT (OUTPATIENT)
Dept: BEHAVIORAL/MENTAL HEALTH CLINIC | Age: 27
End: 2022-01-31
Payer: COMMERCIAL

## 2022-01-31 DIAGNOSIS — Z62.820 RELATIONSHIP PROBLEM WITH PARENT: ICD-10-CM

## 2022-01-31 DIAGNOSIS — F33.41 RECURRENT MAJOR DEPRESSIVE DISORDER, IN PARTIAL REMISSION (HCC): Primary | ICD-10-CM

## 2022-01-31 DIAGNOSIS — Z63.0 MARITAL STRESS: ICD-10-CM

## 2022-01-31 DIAGNOSIS — F41.1 GENERALIZED ANXIETY DISORDER: ICD-10-CM

## 2022-01-31 PROCEDURE — 99215 OFFICE O/P EST HI 40 MIN: CPT | Performed by: COUNSELOR

## 2022-01-31 SDOH — SOCIAL STABILITY - SOCIAL INSECURITY: PROBLEMS IN RELATIONSHIP WITH SPOUSE OR PARTNER: Z63.0

## 2022-02-06 NOTE — PROGRESS NOTES
[facility]    Time Start: 2:46pm   Time End: 3:34pm  Date of Service:  1/31/2022    Subjective:  Laurel Madden reports she has been feeling \"pretty well, lately. \" She has not had a session in almost 2 months, and shared the holidays (Thanksgiving and Christmas) went well. She shared the specific techniques discussed during last session to mitigate conflict with her mother during both visits were effective. There was no incidence of conflict; however, Odalys remains remorsed that interactions with her mother are always \"without\" due to their tense relationship. Odalys and her  continue to get along well, as evidenced by her report of ongoing effective communication about their respective mental health symptoms on any given day. 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 Additionally, she no longer considers Mathew's marijuana or alcohol use to be an issue anymore. She did not provide details about his current level of use. She comments looking forward to Joshua providing a 2nd sample to the Urologist to determine his sperm count levels. Odayls reports Joshua shared the 1st sample was [employer] and she is aware he may be lying, but wants to give him the benefit of the doubt, and give him until the end of February to submit another sample so they may  learn the next steps for fertility treatment. They both share an interest to begin having children; however, recognize finding a remedy for Mathew's low sperm count will help them be successful with conception. Follow-up next session. Odalys reports she continues to struggle with focus and organization.  Clinician recommended an ADHD assessment, and invited Odalys to schedule an evaluation. She agreed. Beyond the assessment, Mary Kauffman is invited to schedule maintenance sessions to ensure mental health symptoms continue as well managed. Objective:  Mental Status:  Appearance: age appropriate, casually dressed and within normal Limits   Affect:  consistent with expectations based upon mood   Attitude: Cooperative and Engageable   Mood:   Euthymic   Thought Process:  Linear and goal directed   Delusions: no evidence of delusions   Perceptions: No perceptual disturbance   Behavior:   open and cooperative   Psychomotor: Within normal limits   Speech: Within normal limits   Eye Contact: good   Orientation:  oriented to person, place, time, and general circumstances   Judgment & Insight:  normal insight and judgment     Risk Assessment:  Current Suicide Risk:  no suicidal ideation  Current Homicide Risk:  no homocidal ideation    Diagnosis:   Diagnosis Orders   1. Recurrent major depressive disorder, in partial remission ([facility] [address]     2. Generalized anxiety disorder     3. Marital stress     4. Relationship problem with parent         Plan/Recommendations:  Continue clinical therapy treatment using evidence-based techniques to address collaboratively identified goals, including creating and following a daily schedule, process and address challenges regarding infertility, empower self-esteem and confidence-building, dispute irrational thoughts, and insight development into decreasing symptoms of depression and anxiety, as well as managing strained relationship with her mother.  Continue use of Cognitive Behavioral therapy to challenge irrational thoughts, Psychoanalysis for insight development, Existential Therapy for acceptance and motivation, and Dialectical Behavioral Therapy to address emotional management.        Electronically signed by Gamaliel Plasencia Ed.D., [facility]  Licensed Professional Clinical Counselor  Director of Behavioral Medicine  [facility] at [facility]

## 2022-02-28 ENCOUNTER — OFFICE VISIT (OUTPATIENT)
Dept: BEHAVIORAL/MENTAL HEALTH CLINIC | Age: 27
End: 2022-02-28
Payer: COMMERCIAL

## 2022-02-28 DIAGNOSIS — Z62.820 RELATIONSHIP PROBLEM WITH PARENT: ICD-10-CM

## 2022-02-28 DIAGNOSIS — Z63.0 MARITAL STRESS: ICD-10-CM

## 2022-02-28 DIAGNOSIS — Z56.6 WORK STRESS: ICD-10-CM

## 2022-02-28 DIAGNOSIS — F33.1 MODERATE EPISODE OF RECURRENT MAJOR DEPRESSIVE DISORDER (HCC): Primary | ICD-10-CM

## 2022-02-28 DIAGNOSIS — R68.89 EPISODES OF DECREASED ATTENTIVENESS: ICD-10-CM

## 2022-02-28 PROCEDURE — 99215 OFFICE O/P EST HI 40 MIN: CPT | Performed by: COUNSELOR

## 2022-02-28 SDOH — HEALTH STABILITY - MENTAL HEALTH: OTHER PHYSICAL AND MENTAL STRAIN RELATED TO WORK: Z56.6

## 2022-02-28 SDOH — SOCIAL STABILITY - SOCIAL INSECURITY: PROBLEMS IN RELATIONSHIP WITH SPOUSE OR PARTNER: Z63.0

## 2022-02-28 ASSESSMENT — PATIENT HEALTH QUESTIONNAIRE - PHQ9
10. IF YOU CHECKED OFF ANY PROBLEMS, HOW DIFFICULT HAVE THESE PROBLEMS MADE IT FOR YOU TO DO YOUR WORK, TAKE CARE OF THINGS AT HOME, OR GET ALONG WITH OTHER PEOPLE: 3
6. FEELING BAD ABOUT YOURSELF - OR THAT YOU ARE A FAILURE OR HAVE LET YOURSELF OR YOUR FAMILY DOWN: 1
8. MOVING OR SPEAKING SO SLOWLY THAT OTHER PEOPLE COULD HAVE NOTICED. OR THE OPPOSITE, BEING SO FIGETY OR RESTLESS THAT YOU HAVE BEEN MOVING AROUND A LOT MORE THAN USUAL: 0
SUM OF ALL RESPONSES TO PHQ QUESTIONS 1-9: 13
SUM OF ALL RESPONSES TO PHQ9 QUESTIONS 1 & 2: 4
3. TROUBLE FALLING OR STAYING ASLEEP: 3
4. FEELING TIRED OR HAVING LITTLE ENERGY: 2
1. LITTLE INTEREST OR PLEASURE IN DOING THINGS: 2
9. THOUGHTS THAT YOU WOULD BE BETTER OFF DEAD, OR OF HURTING YOURSELF: 0
5. POOR APPETITE OR OVEREATING: 2
2. FEELING DOWN, DEPRESSED OR HOPELESS: 2
7. TROUBLE CONCENTRATING ON THINGS, SUCH AS READING THE NEWSPAPER OR WATCHING TELEVISION: 1

## 2022-03-01 NOTE — PROGRESS NOTES
[facility]    Time Start: 9:45am   Time End: 10:54am  Date of Service:  2/28/2022    Subjective:  JUNE 30 yo who presents for recurrent major depressive disorder and marital stress. She also has a history of generalized anxiety disorder and is being evaluated for ADHD (brought in forms today). Pts greatest concern today is with her depression with PHQ-9 score of 13, but notes her anxiety has improved with DIONI-7 score of 2. She has been taking Lexapro 20 mg nightly due to causing her drowsiness. Concerning her depression, she states her sleep is out of control.  There are mornings where she is unable to get up and it has become debilitating. She theorizes that if her , Emily Riggins, were to come in at 7 AM to tell her to get up out of bed or hes getting a divorce, she cannot get up. She denies any relation of her inability to get out of bed to any foods or drinks, she does not drink alcohol. She states she is sleeping too much, going to bed around 11-11:30 and not getting out of bed until 9:30 without waking up at night. Sometimes she will wake to use the bathroom, and when she goes back to sleep, she feels like it is a deeper sleep with dreams that is difficult to get out. She does not remember her dreams much, but states nothing sticks out to her. Of note, there are some days when pt is able to wake up for work and other tasks. Examples include conferences for work and tasks such as taking her cat to the [employer] and the appointment with behavioral health this morning. There is a loose flexibility with her ability to get up and out of bed for work and tasks, as normal work days she finds it difficult to get out of bed and instead tries to go back to sleep. She finds that Emily Riggins is reasonably frustrated with her at times, and at other times is understanding.  More recently, he has verbalized his frustrations much more in the AM, stating that he has a lot of things going on too that he needs to do. An example is when she states that she will get up at 7 AM the next morning, but then she does not get up and follow-through. They have verbalized to each other the support that they need, and he has been able to do this, she knows she hasnt reciprocated her support. She recognizes that their relationship is strained, but he has been stepping up. She doesnt feel like an equal partner and feels like shit.  He continues to do the early morning duties of taking the dogs out, feeding the cats, feeding her chickens that she wanted to buy, turn on the barn equipment, and then they agreed she could do these tasks in the evening. She has been inconsistent with her PM duties due to guilt from lack of accomplishments during the day. Patient states her inability to get out of bed is not related to her occupation. She states the work she is doing is what she wants to be doing. Expresses excitement for this coming year, feeling good, and has a passion toward it. 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 However, patient begins to give a list of many things that need to be done, stating it can go on and on. Includes needing to write a maribell due this week, has presentation to do, is 6 months behind on a newsletter, etc. Patient states she has always been a procrastinator, and she puts herself down for this. In the meantime of these tasks, she has been able to get her office organized, but not able to get the work done. Continued encouragement of voicing needs her needs and trying to set up more realistic expectations of her. Patient is aware that something needs to change. When asked what would change look like, pt responds after a moment with I dont know.  She thinks something with her medication needs to change. She is on the max dose of Lexapro at 20 mg and knows this needs to be continued.  She has stopped taking medications before and states she went of depression and anxiety (in remission), as well as managing strained relationship with her mother.  Continue use of Cognitive Behavioral therapy to challenge irrational thoughts, Psychoanalysis for insight development, Existential Therapy for acceptance and motivation, and Dialectical Behavioral Therapy to address emotional management.          Electronically signed by Fredy Hartman Ed.D., [facility]  Licensed Professional Clinical Counselor  Director of Behavioral Medicine  [facility] Res[facility] Program at [facility]

## 2022-03-21 NOTE — PROGRESS NOTES
[facility]    Time Start: 10:30am   Time End: 11:30am  Date of Service:  3/22/2022    Basis of Evaluation:   [x]  Clinical Interview  [x]  Review of Medical Record    [x] Patient Health Questionnaire (PHQ)  []  Interview family member    Presenting Concerns:  Wes Talley is a 31 y.o. who was self-referred due to concerns about anxiety, depression, and suspected ADHD. Odalys reported the following symptoms of depression: anhedonia, depressed mood, difficulty concentrating, fatigue, feelings of worthlessness/guilt, hopelessness and hypersomnia. She also reported loss of energy, sense of worthlessness, sense of helplessness, sense of hopelessness, irritability, too much sleep, sad mood and loss of interest. In addition, Odalys reported symptoms of agitation. There is no evidence of mare or a thought disorder. 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 She struggles to consistently awake in the morning to complete job responsibilities, which causes marital tension because her  is left to complete all of the morning work. Odalys has attempted numerous strategies to attempt to awake in the morning; however, is prevalently unable to get out of bed. She reports ability to awake only on mornings when there is a farmer's market or outside appointment. Further, Odalys reports ongoing difficulty with completing tasks once they're started, often leaving numerous unfinished projects. She reports consistently becoming distracted with tasks required in both home and work setting, as well as recurring forgetfulness in daily activities. She shared at attempt to make lists and calendars, but has not been successful in her attempt to start or complete tasks.  This negatively impacts her experience with the business, as she does not perceive she is functioning at her required potential. Across all settings (work, home, activities. ? Often does not seem to listen when spoken to directly. ? Often does not follow through on instructions and fails to finish chores, or duties in the workplace (e.g., loses focus, side-tracked). ? Often has trouble organizing tasks and activities. ? Often avoids, dislikes, or is reluctant to do tasks that require mental effort over a long period of time    ? Is often easily distracted   2. Hyperactivity and Impulsivity:  ? Often fidgets with or taps hands or feet, or squirms in seat. ? Often leaves seat in situations when remaining seated is expected. ? Often unable to play or take part in leisure activities quietly. ? Often blurts out an answer before a question has been completed. ? Often has trouble waiting their turn. ? Often interrupts or intrudes on others   In addition, the following conditions are met:  · Several inattentive or hyperactive-impulsive symptoms were present before age 14 years. · Several symptoms are present in two or more settings  · There is clear evidence that the symptoms interfere with, or reduce the quality of, social, or work functioning. · The symptoms are not better explained by another mental disorder     She reported that her symptoms began \"since childhood\". Previous behavioral health treatment history: Therapy, Out Patient Cuco Dickerson actively participating in outpatient psychotherapy services since January 2021).     Mary Kauffman has a current medication list which includes the following prescription(s): citalopram and montelukast.    PHQ Scores 2/28/2022 1/13/2021 10/7/2020 8/26/2020   PHQ2 Score 4 6 2 3   PHQ9 Score 13 24 7 8     Interpretation of Total Score Depression Severity: 1-4 = Minimal depression, 5-9 = Mild depression, 10-14 = Moderate depression, 15-19 = Moderately severe depression, 20-27 = Severe depression    DIONI-7 TOTAL SCORE: 17     Adult ADHD Self-Report Scale (ASRS-v1.1) Symptom Checklist:  Part A (Inattentive) - 14 out of a possible 24. Part B (Hyperactive/Impusivity) - 29 out of a possible 48. Scores in the 0-16 range are NOT indicative symptoms consistent with ADHD in adults. Scores in the 17-23 range are likely indicative symptoms consistent with ADHD in adults. Scores in the 24+ range are highly likely indicative symptoms consistent with ADHD in adults.      Adult ADHD Self-Report Screening Scale for DSM-5 (ASRS-5):  Total Score: 13 out of a possible 24. Scores in the 14+ range are likely indicative symptoms consistent with ADHD in adults.     [facility] Rating Scale [facility]): Total Score: 70 out of a possible 100. Cutoff score of 46 or above is predictive of having childhood ADHD. Mental Status:  Appearance: age appropriate, casually dressed and within normal Limits   Affect:  flat   Attitude: Cooperative and Engageable   Mood:   Dysphoric and Apathetic   Thought Process:  Linear and goal directed   Delusions: no evidence of delusions   Perceptions: No perceptual disturbance   Behavior:   open and cooperative   Psychomotor: Within normal limits   Speech: Within normal limits   Eye Contact: good   Orientation:  oriented to person, place, time, and general circumstances   Judgment & Insight:  normal insight and judgment     Risk Assessment:  Current Suicide Risk:  no suicidal ideation  Current Homicide Risk:  no homocidal ideation    Odalys reported no previous history of suicidal ideation or behavior. Social History/Functioning:  Jhonny Macias is currently living with family (lives in [place] with , Colin Smith, and many animals) and reported a good social support network (family and friends). She denied any current cultural or spiritual concerns. Social History     Socioeconomic History    Marital status:      Spouse name: Naun Murphy Number of children: Not on file    Years of education: Not on file    Highest education level:  Bachelor's degree (e.g., BA, AB, BS)   Occupational History    Occupation: farmer   Tobacco Use    Smoking status: Never Smoker    Smokeless tobacco: Never Used   Vaping Use    Vaping Use: Never used   Substance and Sexual Activity    Alcohol use: Yes     Alcohol/week: 6.0 standard drinks     Types: 3 Glasses of wine, 3 Cans of beer per week    Drug use: Never    Sexual activity: Yes     Partners: Male   Other Topics Concern    Not on file   Social History Narrative    She is a . She is  and is currently trying to conceive. She has 3 dogs, 2 cats, 3 goats, and 27 chickens. Moved from [address] in April 2020     Social Determinants of Health     Financial Resource Strain:     Difficulty of Paying Living Expenses: Not on file   Food Insecurity:     Worried About Running Out of Food in the Last Year: Not on file    Angie of Food in the Last Year: Not on file   Transportation Needs:     Lack of Transportation (Medical): Not on file    Lack of Transportation (Non-Medical):  Not on file   Physical Activity:     Days of Exercise per Week: Not on file    Minutes of Exercise per Session: Not on file   Stress:     Feeling of Stress : Not on file   Social Connections:     Frequency of Communication with Friends and Family: Not on file    Frequency of Social Gatherings with Friends and Family: Not on file    Attends Sikhism Services: Not on file    Active Member of [address] [place] [address] or Organizations: Not on file    Attends Club or Organization Meetings: Not on file    Marital Status: Not on file   Intimate Partner Violence:     Fear of Current or Ex-Partner: Not on file    Emotionally Abused: Not on file    Physically Abused: Not on file    Sexually Abused: Not on file   Housing Stability:     Unable to Pay for Housing in the Last Year: Not on file    Number of [place] in the Last Year: Not on file    Unstable Housing in the Last Year: Not on file       Past Medical History:   Diagnosis Date    Anxiety     Depression     Environmental and seasonal allergies 2017    Headache     Irregular menses 08/26/2020    Sprain of anterior talofibular ligament of right ankle 2020     Diagnosis:   Diagnosis Orders   1. Moderate episode of recurrent major depressive disorder ([address]     2. Work stress     3. Marital stress     4. Relationship problem with parent     5. Episodes of decreased attentiveness     6. Hypersomnia     7. History of anxiety     8. ADHD (attention deficit hyperactivity disorder) evaluation         Strengths: Motivated for treatment, Good social support, Good premorbid functioning and Appears psychologically minded     Limitations: None    Patient Response to Plan/Recommendations: Today, we discussed psychoeducation of treatment for depression, work and marital stress, hypersomnia, and symptoms related to decreased attentiveness and focus. Discussed confidentiality and limits of confidentiality as it applies to treatment within [facility] and my role as member of the medical treatment team.     Assessment, Impressions and Plan:  Continue clinical therapy treatment using evidence-based techniques to address collaboratively identified goals, including creating and following a daily schedule, process and address challenges regarding infertility, empower self-esteem and confidence-building, dispute irrational thoughts, and insight development into decreasing symptoms of depression and anxiety (in remission), as well as managing work and marital stress, and managing strained relationship with her mother. Continue use of Cognitive Behavioral therapy to challenge irrational thoughts, Psychoanalysis for insight development, Existential Therapy for acceptance and motivation, and Dialectical Behavioral Therapy to address emotional management.     Initial Treatment Plan/Recommendations:  Ursula Chan is interested in a low-dose stimulant-based medication to treat symptoms of ADHD, combined type.  She was invited to schedule an appointment with her PCP. Contact Trixie Yarbrough, [facility] as needed.     Electronically signed by Trixie Yarbrough [facility], [place], [facility]  Licensed Professional Clinical Counselor  Director of [address] [facility] and [facility] Medicine Residency Program at [address] [place] [address]

## 2022-03-22 ENCOUNTER — OFFICE VISIT (OUTPATIENT)
Dept: BEHAVIORAL/MENTAL HEALTH CLINIC | Age: 27
End: 2022-03-22
Payer: COMMERCIAL

## 2022-03-22 DIAGNOSIS — F90.2 ADHD (ATTENTION DEFICIT HYPERACTIVITY DISORDER), COMBINED TYPE: ICD-10-CM

## 2022-03-22 DIAGNOSIS — F33.1 MODERATE EPISODE OF RECURRENT MAJOR DEPRESSIVE DISORDER (HCC): Primary | ICD-10-CM

## 2022-03-22 DIAGNOSIS — Z56.6 WORK STRESS: ICD-10-CM

## 2022-03-22 DIAGNOSIS — R68.89 EPISODES OF DECREASED ATTENTIVENESS: ICD-10-CM

## 2022-03-22 DIAGNOSIS — Z86.59 HISTORY OF ANXIETY: ICD-10-CM

## 2022-03-22 DIAGNOSIS — Z63.0 MARITAL STRESS: ICD-10-CM

## 2022-03-22 DIAGNOSIS — G47.10 HYPERSOMNIA: ICD-10-CM

## 2022-03-22 DIAGNOSIS — Z62.820 RELATIONSHIP PROBLEM WITH PARENT: ICD-10-CM

## 2022-03-22 DIAGNOSIS — Z13.39 ADHD (ATTENTION DEFICIT HYPERACTIVITY DISORDER) EVALUATION: ICD-10-CM

## 2022-03-22 PROCEDURE — 99215 OFFICE O/P EST HI 40 MIN: CPT | Performed by: COUNSELOR

## 2022-03-22 SDOH — SOCIAL STABILITY - SOCIAL INSECURITY: PROBLEMS IN RELATIONSHIP WITH SPOUSE OR PARTNER: Z63.0

## 2022-03-22 SDOH — HEALTH STABILITY - MENTAL HEALTH: OTHER PHYSICAL AND MENTAL STRAIN RELATED TO WORK: Z56.6

## 2022-04-11 ENCOUNTER — OFFICE VISIT (OUTPATIENT)
Dept: BEHAVIORAL/MENTAL HEALTH CLINIC | Age: 27
End: 2022-04-11
Payer: COMMERCIAL

## 2022-04-11 DIAGNOSIS — Z56.6 WORK STRESS: ICD-10-CM

## 2022-04-11 DIAGNOSIS — Z62.820 RELATIONSHIP PROBLEM WITH PARENT: ICD-10-CM

## 2022-04-11 DIAGNOSIS — F33.1 MODERATE EPISODE OF RECURRENT MAJOR DEPRESSIVE DISORDER (HCC): Primary | ICD-10-CM

## 2022-04-11 DIAGNOSIS — F90.2 ADHD (ATTENTION DEFICIT HYPERACTIVITY DISORDER), COMBINED TYPE: ICD-10-CM

## 2022-04-11 DIAGNOSIS — G47.10 HYPERSOMNIA: ICD-10-CM

## 2022-04-11 DIAGNOSIS — Z86.59 HISTORY OF ANXIETY: ICD-10-CM

## 2022-04-11 DIAGNOSIS — R68.89 EPISODES OF DECREASED ATTENTIVENESS: ICD-10-CM

## 2022-04-11 DIAGNOSIS — Z63.0 MARITAL STRESS: ICD-10-CM

## 2022-04-11 PROCEDURE — 99215 OFFICE O/P EST HI 40 MIN: CPT | Performed by: COUNSELOR

## 2022-04-11 SDOH — SOCIAL STABILITY - SOCIAL INSECURITY: PROBLEMS IN RELATIONSHIP WITH SPOUSE OR PARTNER: Z63.0

## 2022-04-11 SDOH — HEALTH STABILITY - MENTAL HEALTH: OTHER PHYSICAL AND MENTAL STRAIN RELATED TO WORK: Z56.6

## 2022-04-11 NOTE — PROGRESS NOTES
[facility]    Time Start: 9:43am   Time End: 10:36am  Date of Service:  4/11/2022    Subjective:  Odalys reports her depression is not well managed. She does not perceive that her antidepressant is working effectively, and is interested in weaning off her Citalopram to assess her mood baseline, then address with another antidepressant. She is not interested in managing her ADHD with prescription treatment until her depression is managed. Clinician recommended Odalys schedule a visit with her PCP right away to discuss treatment for depression moving forward. Further, Clinician reminded Odalys to avoid stopping her Citalopram altogether before speaking with her PCP, as she experienced severe depression in December 2020 the last time she decided to forego her antidepressant treatment without consulting with her PCP first. Edmar Lemuel was agreeable. Odalys shared her marriage remains \"stressful but okay. \" She reports her 's infertility issues continue, and confirmed he recently attended his own doctor's appointment to learn there are not any viable sperm in his submitted sample. Odalys reports she feels the need to remain optimistic for her 's sake, and reminds him that further imagine may render further understanding in the cause and/or treatment to improve sperm count. Ultimately, Edmar Hdez is aware that alternate methods to have a child exist, including adoption, and has not ruled these out if her and her  are unable to conceive their own biological child. Continue to follow-up on each session.     PHQ Scores 4/24/2022 4/14/2022 2/28/2022 1/13/2021 10/7/2020 8/26/2020   PHQ2 Score 4 5 4 6 2 3   PHQ9 Score 16 17 13 24 7 8     Interpretation of Total Score Depression Severity: 1-4 = Minimal depression, 5-9 = Mild depression, 10-14 = Moderate depression, 15-19 = Moderately severe depression, 20-27 = Severe depression    DIONI-7 Score: 0    Objective:  Mental Status:  Appearance: age appropriate, casually dressed and within normal Limits   Affect:  flat   Attitude: Cooperative and Engageable   Mood:   Dysphoric and Apathetic   Thought Process:  Linear, Vague and goal directed   Delusions: no evidence of delusions   Perceptions: No perceptual disturbance   Behavior:   open, cooperative and tearful   Psychomotor: Within normal limits   Speech: Within normal limits   Eye Contact: good   Orientation:  oriented to person, place, time, and general circumstances   Judgment & Insight:  impaired insight     Risk Assessment:  Current Suicide Risk:  no suicidal ideation  Current Homicide Risk:  no homocidal ideation    Diagnosis:   Diagnosis Orders   1. Moderate episode of recurrent major depressive disorder ([facility] [address]     2. ADHD (attention deficit hyperactivity disorder), combined type     3. Work stress     4. Marital stress     5. Relationship problem with parent     6. Episodes of decreased attentiveness     7. Hypersomnia     8. History of anxiety         Plan/Recommendations:  Continue clinical therapy treatment using evidence-based techniques to address collaboratively identified goals, including creating and following a daily schedule, process and address challenges regarding infertility, manage symptoms of ADHD, empower self-esteem and confidence-building, dispute irrational thoughts, and insight development into decreasing symptoms of depression and anxiety (in remission), as well as managing strained relationship with her mother.  Continue use of Cognitive Behavioral therapy to challenge irrational thoughts, Psychoanalysis for insight development, Existential Therapy for acceptance and motivation, and Dialectical Behavioral Therapy to address emotional management.     Electronically signed by Fannie Hancock Ed.D., LPCC-S  Licensed Professional Clinical Counselor  Director of [address] [facility] and [facility] Medicine Residency Program at [facility]

## 2022-04-12 ENCOUNTER — NURSE TRIAGE (OUTPATIENT)
Dept: OTHER | Facility: CLINIC | Age: 27
End: 2022-04-12

## 2022-04-12 NOTE — TELEPHONE ENCOUNTER
Received call from Casimiro at [facility] with Red Flag Complaint. Subjective: Caller states \"I am on current medication and I doesn't seem to be working\"     Current Symptoms: worsening depression. Interfering with daily activity. Pt believes her medication is not working as much as it used too. Onset: a few months ago; worsening    Associated Symptoms: reduced appetite, reduced fluid intake, increased sleepiness    Pain Severity: 0/10; N/A; none    Temperature: No     What has been tried: NA    Pregnant: No    Recommended disposition: See PCP within 3 Days    Care advice provided, patient verbalizes understanding; denies any other questions or concerns; instructed to call back for any new or worsening symptoms. Patient/Caller agrees with recommended disposition; writer provided warm transfer to 's at [facility] for appointment scheduling     Attention Provider: Thank you for allowing me to participate in the care of your patient. The patient was connected to triage in response to information provided to the ECC/PSC. Please do not respond through this encounter as the response is not directed to a shared pool. Reason for Disposition   Symptoms interfere with work or school    Answer Assessment - Initial Assessment Questions  1. CONCERN: \"What happened that made you call today? \"      Last night my  called his mother to come over because he was concern     2. DEPRESSION SYMPTOM SCREENING: \"How are you feeling overall? \" (e.g., decreased energy, increased sleeping or difficulty sleeping, difficulty concentrating, feelings of sadness, guilt, hopelessness, or worthlessness)      Increased sleeping, feelings sadness, guilt, hopelessness, or worthlessness    3. RISK OF HARM - SUICIDAL IDEATION:  \"Do you ever have thoughts of hurting or killing yourself? \"  (e.g., yes, no, no but preoccupation with thoughts about death)    - INTENT:  \"Do you have thoughts of hurting or killing yourself right NOW? \" (e.g., yes, no, N/A)    - PLAN: \"Do you have a specific plan for how you would do this? \" (e.g., gun, knife, overdose, no plan, N/A)      No    4. RISK OF HARM - HOMICIDAL IDEATION:  \"Do you ever have thoughts of hurting or killing someone else? \"  (e.g., yes, no, no but preoccupation with thoughts about death)    - INTENT:  \"Do you have thoughts of hurting or killing someone right NOW? \" (e.g., yes, no, N/A)    - PLAN: \"Do you have a specific plan for how you would do this? \" (e.g., gun, knife, no plan, N/A)       No    5. FUNCTIONAL IMPAIRMENT: \"How have things been going for you overall? Have you had more difficulty than usual doing your normal daily activities? \"  (e.g., better, same, worse; self-care, school, work, interactions)      Things are in a much better place than when I started the mediation    6. SUPPORT: \"Who is with you now? \" \"Who do you live with?\" \"Do you have family or friends who you can talk to? \"       , family members    7. THERAPIST: \"Do you have a counselor or therapist? Name? \"      Dr. Diaz Orozco    8. STRESSORS: \"Has there been any new stress or recent changes in your life? \"      No    9. ALCOHOL USE OR SUBSTANCE USE (DRUG USE): \"Do you drink alcohol or use any illegal drugs? \"      Social drinks    10. OTHER: \"Do you have any other physical symptoms right now? \" (e.g., fever)        No    11. PREGNANCY: \"Is there any chance you are pregnant? \" \"When was your last menstrual period? \"        No    Protocols used: DEPRESSION-ADULT-[place]

## 2022-04-14 ENCOUNTER — OFFICE VISIT (OUTPATIENT)
Dept: PRIMARY CARE CLINIC | Age: 27
End: 2022-04-14
Payer: COMMERCIAL

## 2022-04-14 VITALS
DIASTOLIC BLOOD PRESSURE: 70 MMHG | WEIGHT: 136.2 LBS | RESPIRATION RATE: 18 BRPM | HEIGHT: 62 IN | HEART RATE: 65 BPM | OXYGEN SATURATION: 99 % | TEMPERATURE: 98.1 F | SYSTOLIC BLOOD PRESSURE: 112 MMHG | BODY MASS INDEX: 25.06 KG/M2

## 2022-04-14 DIAGNOSIS — F33.2 SEVERE EPISODE OF RECURRENT MAJOR DEPRESSIVE DISORDER, WITHOUT PSYCHOTIC FEATURES (HCC): Primary | ICD-10-CM

## 2022-04-14 DIAGNOSIS — Z00.00 HEALTH CARE MAINTENANCE: ICD-10-CM

## 2022-04-14 PROCEDURE — 99214 OFFICE O/P EST MOD 30 MIN: CPT

## 2022-04-14 RX ORDER — BUPROPION HYDROCHLORIDE 150 MG/1
150 TABLET ORAL EVERY MORNING
Qty: 30 TABLET | Refills: 5 | Status: SHIPPED | OUTPATIENT
Start: 2022-04-14 | End: 2022-04-28 | Stop reason: SDUPTHER

## 2022-04-14 ASSESSMENT — PATIENT HEALTH QUESTIONNAIRE - PHQ9
3. TROUBLE FALLING OR STAYING ASLEEP: 3
SUM OF ALL RESPONSES TO PHQ9 QUESTIONS 1 & 2: 5
4. FEELING TIRED OR HAVING LITTLE ENERGY: 3
5. POOR APPETITE OR OVEREATING: 1
SUM OF ALL RESPONSES TO PHQ QUESTIONS 1-9: 16
6. FEELING BAD ABOUT YOURSELF - OR THAT YOU ARE A FAILURE OR HAVE LET YOURSELF OR YOUR FAMILY DOWN: 2
SUM OF ALL RESPONSES TO PHQ QUESTIONS 1-9: 17
8. MOVING OR SPEAKING SO SLOWLY THAT OTHER PEOPLE COULD HAVE NOTICED. OR THE OPPOSITE, BEING SO FIGETY OR RESTLESS THAT YOU HAVE BEEN MOVING AROUND A LOT MORE THAN USUAL: 0
2. FEELING DOWN, DEPRESSED OR HOPELESS: 3
7. TROUBLE CONCENTRATING ON THINGS, SUCH AS READING THE NEWSPAPER OR WATCHING TELEVISION: 2
10. IF YOU CHECKED OFF ANY PROBLEMS, HOW DIFFICULT HAVE THESE PROBLEMS MADE IT FOR YOU TO DO YOUR WORK, TAKE CARE OF THINGS AT HOME, OR GET ALONG WITH OTHER PEOPLE: 3
1. LITTLE INTEREST OR PLEASURE IN DOING THINGS: 2
9. THOUGHTS THAT YOU WOULD BE BETTER OFF DEAD, OR OF HURTING YOURSELF: 1

## 2022-04-14 ASSESSMENT — ANXIETY QUESTIONNAIRES
4. TROUBLE RELAXING: 1
GAD7 TOTAL SCORE: 5
5. BEING SO RESTLESS THAT IT IS HARD TO SIT STILL: 0
1. FEELING NERVOUS, ANXIOUS, OR ON EDGE: 1
2. NOT BEING ABLE TO STOP OR CONTROL WORRYING: 0
IF YOU CHECKED OFF ANY PROBLEMS ON THIS QUESTIONNAIRE, HOW DIFFICULT HAVE THESE PROBLEMS MADE IT FOR YOU TO DO YOUR WORK, TAKE CARE OF THINGS AT HOME, OR GET ALONG WITH OTHER PEOPLE: SOMEWHAT DIFFICULT
7. FEELING AFRAID AS IF SOMETHING AWFUL MIGHT HAPPEN: 0
3. WORRYING TOO MUCH ABOUT DIFFERENT THINGS: 0
6. BECOMING EASILY ANNOYED OR IRRITABLE: 3

## 2022-04-14 NOTE — PATIENT INSTRUCTIONS
Take half citalopram for one week and then stop. Start Wellbutrin 150mg for one week, and then increase to 300mg. Follow up in 2 weeks for reassessment.

## 2022-04-14 NOTE — PROGRESS NOTES
[address] and [facility]idency [facility]                                             [address],  [facility] [address]        Phone: [phone]      Name:  Stacia Rucker  :    1995    Consultants:   Patient Care Team:  Tiffanie Anderson DO as PCP - General (Family Medicine)  [facility] as PCP - Gelacio Gallo (Licensed Clinical )    Chief Complaint:     Stacia Rucker is a 31 y.o. female  who presents today for an established patient care visit with [address] [place] [address] as noted below. Chief Complaint   Patient presents with    Depression     HPI:     Stacia Rucker is a 24yo female with a PMH of depression and allergies presenting today for worsened depression. Depression - citalopram not working 2020, tried escitalopram and sertraline. All work food in the beginning. Sleeping too much, 11 hours per day. 3 days a week who. Appetite is good but not consistent. Live with , good. Enjoys gardening animals, hanging out with friends. Anxiety in the past but she reports its not a problem today. DIONI 7 SCORE 2022 10/7/2020 2020   DIONI-7 Total Score 5 - -   DIONI-7 Total Score - 3 4     Interpretation of DIONI-7 score: 5-9 = mild anxiety, 10-14 = moderate anxiety, 15+ = severe anxiety. Recommend referral to behavioral health for scores 10 or greater. PHQ Scores 2022 2022 2021 10/7/2020 2020   PHQ2 Score 5 4 6 2 3   PHQ9 Score 17 13 24 7 8     Interpretation of Total Score Depression Severity: 1-4 = Minimal depression, 5-9 = Mild depression, 10-14 = Moderate depression, 15-19 = Moderately severe depression, 20-27 = Severe depression    ADHD - recent diagnosis by Dr. Lyudmila Cardenas. It has been a problem all her life. Currently more concerned with depression that has been worsening.     Allergies - well controlled with montelukast, no intolerable side effects        Patient Active Problem List   Diagnosis    Anxiety and depression    Environmental and seasonal allergies         Past Medical History:    Past Medical History:   Diagnosis Date    Anxiety     Depression     Environmental and seasonal allergies 2017    Headache     Irregular menses 08/26/2020    Sprain of anterior talofibular ligament of right ankle 2020       Past Surgical History:  Past Surgical History:   Procedure Laterality Date    WISDOM TOOTH EXTRACTION         Home Meds:  Prior to Visit Medications    Medication Sig Taking? Authorizing Provider   buPROPion (WELLBUTRIN XL) 150 MG extended release tablet Take 1 tablet by mouth every morning Yes Terell Luis,    citalopram (CELEXA) 20 MG tablet TAKE 1 TABLET BY MOUTH DAILY Yes Nury Harley, DO   montelukast (SINGULAIR) 10 MG tablet Take 1 tablet by mouth daily Yes Giovana Doss MD       Allergies:    Patient has no known allergies.     Family History:       Problem Relation Age of Onset    No Known Problems Mother     Kidney stones Father     Collagen Disease Father         diverticulitis         Health Maintenance Completed:  Health Maintenance   Topic Date Due    Hepatitis C screen  Never done    HIV screen  Never done    DTaP/Tdap/Td vaccine (1 - Tdap) Never done    Pap smear  04/02/2022    Flu vaccine (Season Ended) 09/01/2022    Depression Monitoring  02/28/2023    COVID-19 Vaccine  Completed    Hepatitis A vaccine  Aged Out    Hepatitis B vaccine  Aged Out    Hib vaccine  Aged Out    Meningococcal (ACWY) vaccine  Aged Out    Pneumococcal 0-64 years Vaccine  Aged Out    Varicella vaccine  Discontinued          Immunization History   Administered Date(s) Administered    COVID-19, J&J, PF, 0.5 mL 04/09/2021    COVID-19, [employer] top, DO NOT Dilute, Isidro-Sucrose, 12+ yrs, PF, 30 mcg/0.3 mL dose 01/21/2022         Review of Systems:  Review of Systems   All other systems reviewed and are negative. Physical Exam:   Vitals:    04/14/22 1617   BP: 112/70   Site: Left Upper Arm   Position: Sitting   Cuff Size: Medium Adult   Pulse: 65   Resp: 18   Temp: 98.1 °F (36.7 °C)   TempSrc: Infrared   SpO2: 99%   Weight: 136 lb 3.2 oz (61.8 kg)   Height: 5' 2\" (1.575 m)     Body mass index is 24.91 kg/m². Wt Readings from Last 3 Encounters:   04/14/22 136 lb 3.2 oz (61.8 kg)   01/18/22 138 lb (62.6 kg)   01/10/22 138 lb 9.6 oz (62.9 kg)       BP Readings from Last 3 Encounters:   04/14/22 112/70   01/04/22 (!) 107/54   12/17/21 114/79       Physical Exam  Constitutional:       Appearance: Normal appearance. She is not ill-appearing. HENT:      Head: Normocephalic and atraumatic. Eyes:      Extraocular Movements: Extraocular movements intact. Pulmonary:      Effort: Pulmonary effort is normal.   Neurological:      General: No focal deficit present. Mental Status: She is alert and oriented to person, place, and time. Mental status is at baseline. Psychiatric:         Mood and Affect: Mood normal.         Behavior: Behavior normal.         Thought Content: Thought content normal.              Lab Review:   No visits with results within 2 Month(s) from this visit.    Latest known visit with results is:   Hospital Outpatient Visit on 02/24/2021   Component Date Value    Vit D, 25-Hydroxy 02/24/2021 33.1     Cholesterol, Total 02/24/2021 196     Triglycerides 02/24/2021 101     HDL 02/24/2021 58     LDL Calculated 02/24/2021 118*    VLDL Cholesterol Calcula* 02/24/2021 20     Sodium 02/24/2021 135*    Potassium 02/24/2021 3.9     Chloride 02/24/2021 101     CO2 02/24/2021 25     Anion Gap 02/24/2021 9     Glucose 02/24/2021 62*    BUN 02/24/2021 10     CREATININE 02/24/2021 0.6     GFR Non- 02/24/2021 >60     GFR  02/24/2021 >60     Calcium 02/24/2021 9.5     Total Protein 02/24/2021 7.3     Albumin 02/24/2021 4.4  Albumin/Globulin Ratio 02/24/2021 1.5     Total Bilirubin 02/24/2021 1.0     Alkaline Phosphatase 02/24/2021 63     ALT 02/24/2021 13     AST 02/24/2021 19     Globulin 02/24/2021 2.9     TSH Reflex FT4 02/24/2021 0.78     Hemoglobin A1C 02/24/2021 4.3     eAG 02/24/2021 76.7     WBC 02/24/2021 5.8     RBC 02/24/2021 4.21     Hemoglobin 02/24/2021 13.1     Hematocrit 02/24/2021 37.6     MCV 02/24/2021 89.1     MCH 02/24/2021 31.0     MCHC 02/24/2021 34.8     RDW 02/24/2021 12.4     Platelets 43/15/0536 285     MPV 02/24/2021 7.8     Neutrophils % 02/24/2021 62.3     Lymphocytes % 02/24/2021 28.0     Monocytes % 02/24/2021 6.3     Eosinophils % 02/24/2021 2.6     Basophils % 02/24/2021 0.8     Neutrophils Absolute 02/24/2021 3.6     Lymphocytes Absolute 02/24/2021 1.6     Monocytes Absolute 02/24/2021 0.4     Eosinophils Absolute 02/24/2021 0.2     Basophils Absolute 02/24/2021 0.0     TSH 02/24/2021 see below           Assessment/Plan:  Odalys was seen today for depression. Diagnoses and all orders for this visit:    Severe episode of recurrent major depressive disorder, without psychotic features ([facility] [address]  -     buPROPion (WELLBUTRIN XL) 150 MG extended release tablet; Take 1 tablet by mouth every morning    Health care maintenance  -     HIV Screen; Future  -     Hepatitis C Antibody; Future        Gareth Laguerre is a 26yo female with a PMH of depression, newly diagnosed ADHD and allergies presenting today for worsening depression    1. Depression - Take half citalopram for one week and then stop. Start Wellbutrin 150mg for one week, and then increase to 300mg. Follow up in 2 weeks for reassessment. 2. ADHD - not controlled. Trial of Wellbutrin as above. Reassess with treatment  3. Allergies - controlled.  Continue montelukast.       Health Maintenance Due:  Health Maintenance Due   Topic Date Due    Hepatitis C screen  Never done    HIV screen  Never done    DTaP/Tdap/Td vaccine (1 - Tdap) Never done    Pap smear  04/02/2022    Address at follow up in 2 weeks. Recommended today. Health care decision maker:  <72years old        RTC:  Return in about 4 weeks (around 5/12/2022). EMR Dragon/transcription disclaimer:  Much of this encounter note is electronic transcription/translation of spoken language to printed texts. The electronic translation of spoken language may be erroneous, or at times, nonsensical words or phrases may be inadvertently transcribed.   Although I have reviewed the note for such errors, some may still exist.

## 2022-04-24 ASSESSMENT — PATIENT HEALTH QUESTIONNAIRE - PHQ9
SUM OF ALL RESPONSES TO PHQ QUESTIONS 1-9: 16
7. TROUBLE CONCENTRATING ON THINGS, SUCH AS READING THE NEWSPAPER OR WATCHING TELEVISION: 3
SUM OF ALL RESPONSES TO PHQ QUESTIONS 1-9: 16
8. MOVING OR SPEAKING SO SLOWLY THAT OTHER PEOPLE COULD HAVE NOTICED. OR THE OPPOSITE, BEING SO FIGETY OR RESTLESS THAT YOU HAVE BEEN MOVING AROUND A LOT MORE THAN USUAL: 1
6. FEELING BAD ABOUT YOURSELF - OR THAT YOU ARE A FAILURE OR HAVE LET YOURSELF OR YOUR FAMILY DOWN: 1
9. THOUGHTS THAT YOU WOULD BE BETTER OFF DEAD, OR OF HURTING YOURSELF: 0
5. POOR APPETITE OR OVEREATING: 1
SUM OF ALL RESPONSES TO PHQ9 QUESTIONS 1 & 2: 4
1. LITTLE INTEREST OR PLEASURE IN DOING THINGS: 2
SUM OF ALL RESPONSES TO PHQ QUESTIONS 1-9: 16
SUM OF ALL RESPONSES TO PHQ QUESTIONS 1-9: 16
10. IF YOU CHECKED OFF ANY PROBLEMS, HOW DIFFICULT HAVE THESE PROBLEMS MADE IT FOR YOU TO DO YOUR WORK, TAKE CARE OF THINGS AT HOME, OR GET ALONG WITH OTHER PEOPLE: 3
4. FEELING TIRED OR HAVING LITTLE ENERGY: 3
2. FEELING DOWN, DEPRESSED OR HOPELESS: 2
3. TROUBLE FALLING OR STAYING ASLEEP: 3

## 2022-04-24 ASSESSMENT — ANXIETY QUESTIONNAIRES
3. WORRYING TOO MUCH ABOUT DIFFERENT THINGS: 0
7. FEELING AFRAID AS IF SOMETHING AWFUL MIGHT HAPPEN: 0
5. BEING SO RESTLESS THAT IT IS HARD TO SIT STILL: 0
2. NOT BEING ABLE TO STOP OR CONTROL WORRYING: 0
GAD7 TOTAL SCORE: 0
1. FEELING NERVOUS, ANXIOUS, OR ON EDGE: 0
4. TROUBLE RELAXING: 0
6. BECOMING EASILY ANNOYED OR IRRITABLE: 0
IF YOU CHECKED OFF ANY PROBLEMS ON THIS QUESTIONNAIRE, HOW DIFFICULT HAVE THESE PROBLEMS MADE IT FOR YOU TO DO YOUR WORK, TAKE CARE OF THINGS AT HOME, OR GET ALONG WITH OTHER PEOPLE: NOT DIFFICULT AT ALL

## 2022-04-24 ASSESSMENT — COLUMBIA-SUICIDE SEVERITY RATING SCALE - C-SSRS
2. HAVE YOU ACTUALLY HAD ANY THOUGHTS OF KILLING YOURSELF?: NO
1. WITHIN THE PAST MONTH, HAVE YOU WISHED YOU WERE DEAD OR WISHED YOU COULD GO TO SLEEP AND NOT WAKE UP?: NO
6. HAVE YOU EVER DONE ANYTHING, STARTED TO DO ANYTHING, OR PREPARED TO DO ANYTHING TO END YOUR LIFE?: NO

## 2022-04-28 ENCOUNTER — OFFICE VISIT (OUTPATIENT)
Dept: PRIMARY CARE CLINIC | Age: 27
End: 2022-04-28
Payer: COMMERCIAL

## 2022-04-28 VITALS
OXYGEN SATURATION: 100 % | TEMPERATURE: 97.8 F | HEIGHT: 62 IN | HEART RATE: 70 BPM | BODY MASS INDEX: 25.17 KG/M2 | SYSTOLIC BLOOD PRESSURE: 108 MMHG | WEIGHT: 136.8 LBS | DIASTOLIC BLOOD PRESSURE: 70 MMHG

## 2022-04-28 DIAGNOSIS — Z53.20 SCREENING FOR HEPATITIS C DECLINED: ICD-10-CM

## 2022-04-28 DIAGNOSIS — Z53.20 HIV SCREENING DECLINED: ICD-10-CM

## 2022-04-28 DIAGNOSIS — Z00.00 ANNUAL PHYSICAL EXAM: Primary | ICD-10-CM

## 2022-04-28 DIAGNOSIS — F33.2 SEVERE EPISODE OF RECURRENT MAJOR DEPRESSIVE DISORDER, WITHOUT PSYCHOTIC FEATURES (HCC): ICD-10-CM

## 2022-04-28 DIAGNOSIS — J30.89 ENVIRONMENTAL AND SEASONAL ALLERGIES: ICD-10-CM

## 2022-04-28 PROBLEM — F32.A ANXIETY AND DEPRESSION: Status: RESOLVED | Noted: 2020-08-26 | Resolved: 2022-04-28

## 2022-04-28 PROBLEM — F41.9 ANXIETY AND DEPRESSION: Status: RESOLVED | Noted: 2020-08-26 | Resolved: 2022-04-28

## 2022-04-28 PROCEDURE — 99395 PREV VISIT EST AGE 18-39: CPT | Performed by: FAMILY MEDICINE

## 2022-04-28 RX ORDER — BUPROPION HYDROCHLORIDE 300 MG/1
300 TABLET ORAL EVERY MORNING
Qty: 90 TABLET | Refills: 1 | Status: SHIPPED | OUTPATIENT
Start: 2022-04-28 | End: 2022-06-30

## 2022-04-28 SDOH — ECONOMIC STABILITY: FOOD INSECURITY: WITHIN THE PAST 12 MONTHS, THE FOOD YOU BOUGHT JUST DIDN'T LAST AND YOU DIDN'T HAVE MONEY TO GET MORE.: NEVER TRUE

## 2022-04-28 SDOH — ECONOMIC STABILITY: FOOD INSECURITY: WITHIN THE PAST 12 MONTHS, YOU WORRIED THAT YOUR FOOD WOULD RUN OUT BEFORE YOU GOT MONEY TO BUY MORE.: NEVER TRUE

## 2022-04-28 ASSESSMENT — ENCOUNTER SYMPTOMS
EYE PAIN: 0
EYE DISCHARGE: 0
TROUBLE SWALLOWING: 0
CONSTIPATION: 0
BLOOD IN STOOL: 0
RHINORRHEA: 0
CHEST TIGHTNESS: 0
BACK PAIN: 0
COLOR CHANGE: 0
SORE THROAT: 0
ABDOMINAL PAIN: 0
DIARRHEA: 0
WHEEZING: 0
SHORTNESS OF BREATH: 0

## 2022-04-28 ASSESSMENT — PATIENT HEALTH QUESTIONNAIRE - PHQ9
3. TROUBLE FALLING OR STAYING ASLEEP: 1
8. MOVING OR SPEAKING SO SLOWLY THAT OTHER PEOPLE COULD HAVE NOTICED. OR THE OPPOSITE, BEING SO FIGETY OR RESTLESS THAT YOU HAVE BEEN MOVING AROUND A LOT MORE THAN USUAL: 0
SUM OF ALL RESPONSES TO PHQ QUESTIONS 1-9: 4
10. IF YOU CHECKED OFF ANY PROBLEMS, HOW DIFFICULT HAVE THESE PROBLEMS MADE IT FOR YOU TO DO YOUR WORK, TAKE CARE OF THINGS AT HOME, OR GET ALONG WITH OTHER PEOPLE: 1
1. LITTLE INTEREST OR PLEASURE IN DOING THINGS: 1
5. POOR APPETITE OR OVEREATING: 1
SUM OF ALL RESPONSES TO PHQ9 QUESTIONS 1 & 2: 1
SUM OF ALL RESPONSES TO PHQ QUESTIONS 1-9: 4
SUM OF ALL RESPONSES TO PHQ QUESTIONS 1-9: 4
2. FEELING DOWN, DEPRESSED OR HOPELESS: 0
4. FEELING TIRED OR HAVING LITTLE ENERGY: 1
7. TROUBLE CONCENTRATING ON THINGS, SUCH AS READING THE NEWSPAPER OR WATCHING TELEVISION: 0
6. FEELING BAD ABOUT YOURSELF - OR THAT YOU ARE A FAILURE OR HAVE LET YOURSELF OR YOUR FAMILY DOWN: 0
SUM OF ALL RESPONSES TO PHQ QUESTIONS 1-9: 4
9. THOUGHTS THAT YOU WOULD BE BETTER OFF DEAD, OR OF HURTING YOURSELF: 0

## 2022-04-28 ASSESSMENT — ANXIETY QUESTIONNAIRES
GAD7 TOTAL SCORE: 4
IF YOU CHECKED OFF ANY PROBLEMS ON THIS QUESTIONNAIRE, HOW DIFFICULT HAVE THESE PROBLEMS MADE IT FOR YOU TO DO YOUR WORK, TAKE CARE OF THINGS AT HOME, OR GET ALONG WITH OTHER PEOPLE: SOMEWHAT DIFFICULT
6. BECOMING EASILY ANNOYED OR IRRITABLE: 2
7. FEELING AFRAID AS IF SOMETHING AWFUL MIGHT HAPPEN: 0
4. TROUBLE RELAXING: 0
2. NOT BEING ABLE TO STOP OR CONTROL WORRYING: 1
1. FEELING NERVOUS, ANXIOUS, OR ON EDGE: 1
3. WORRYING TOO MUCH ABOUT DIFFERENT THINGS: 0
5. BEING SO RESTLESS THAT IT IS HARD TO SIT STILL: 0

## 2022-04-28 ASSESSMENT — SOCIAL DETERMINANTS OF HEALTH (SDOH): HOW HARD IS IT FOR YOU TO PAY FOR THE VERY BASICS LIKE FOOD, HOUSING, MEDICAL CARE, AND HEATING?: NOT HARD AT ALL

## 2022-04-28 NOTE — PROGRESS NOTES
Rigoberto Thurman     1995    Consultants:  Patient Care Team:  Maldonado Browne DO as PCP - General (Family Medicine)  [facility] as PCP - Gelacio Gallo (Licensed Clinical )    Chief Complaint:  Chief Complaint   Patient presents with    Annual Exam    Depression    ADHD    Allergies    Health Maintenance     HPI:    Rigoberto Thurman is a 28 y.o. female  is an established patient who presents for annual exam and follow up of depression and ADHD, seasonal allergies, health maintenance:    -Depression and ADHD:   -dx by Dr. Elyse Dillon recently  Had been on citalopram, escitalopram previously,   Felt like Citalopram worked in beginning but not long term  Seen on 4/14/22 by Dr. Maldonado Browne  Started on Wellbutrin 300 mg daily  No longer taking citalopram  Has notice a huge change first week.   First week was feeling really good about it  Felt frustrated at times and started crying for no reason  Stress is very high, did notice some anxiety that was heightened  States she felt like she was unable to do anything  Was not able to complete tasks which made her feel less controlled  States it was easier when set in stone appointment  As far as work tasks  everyday things was difficult  Has been nauseous and had less appetite, today feels fine  No changes in libido and sex drive  Has had fertility, attempting since 2017,  dx with [facility]ospermatic    PHQ-9 Total Score: 4 (4/28/2022  9:26 AM)  Thoughts that you would be better off dead, or of hurting yourself in some way: 0 (4/28/2022  9:26 AM)    DIONI 7 SCORE 4/28/2022 4/24/2022 4/14/2022 10/7/2020 8/26/2020   DIONI-7 Total Score 4 0 5 - -   DIONI-7 Total Score - - - 3 4     Wt Readings from Last 3 Encounters:   04/28/22 136 lb 12.8 oz (62.1 kg)   04/14/22 136 lb 3.2 oz (61.8 kg)   01/18/22 138 lb (62.6 kg)     -Seasonal Allergies:  Sx controlled when taking Singulair 10 mg PRN    Health maintenance:  Immunizations:  Patient states she knows she had Tdap in college in past 10 years  Had COVID 19 vaccine J/J and booster Pfier    Cervical Cancer Screening:  Pap smear done at most recent OBGYN visit in past 2 years per patient, will get records for us as not sure where she had these done    Patient Active Problem List   Diagnosis    Environmental and seasonal allergies    Severe episode of recurrent major depressive disorder, without psychotic features ([address]         Past Medical History:    Past Medical History:   Diagnosis Date    Anxiety     Depression     Environmental and seasonal allergies 2017    Headache     Irregular menses 08/26/2020    Sprain of anterior talofibular ligament of right ankle 2020       Past Surgical History:  Past Surgical History:   Procedure Laterality Date    WISDOM TOOTH EXTRACTION         Home Meds:  Prior to Visit Medications    Medication Sig Taking? Authorizing Provider   buPROPion (WELLBUTRIN XL) 300 MG extended release tablet Take 1 tablet by mouth every morning Yes Donte Ferreira. Radha Regan., DO   montelukast (SINGULAIR) 10 MG tablet Take 1 tablet by mouth daily Yes Paris Velasco MD       Allergies:    Patient has no known allergies. Family History:       Problem Relation Age of Onset    No Known Problems Mother     Kidney stones Father     Collagen Disease Father         diverticulitis       Social History:   Social History     Socioeconomic History    Marital status:      Spouse name: David Del Rosario Number of children: Not on file    Years of education: Not on file    Highest education level: Bachelor's degree (e.g., BA, AB, BS)   Occupational History    Occupation: farmer   Tobacco Use    Smoking status: Never Smoker    Smokeless tobacco: Never Used   Vaping Use    Vaping Use: Never used   Substance and Sexual Activity    Alcohol use:  Yes     Alcohol/week: 6.0 standard drinks     Types: 3 Glasses of wine, 3 Cans of beer per week    Drug use: Never    Sexual activity: Yes Partners: Male   Other Topics Concern    Not on file   Social History Narrative    She is a . She is  and is currently trying to conceive. She has 3 dogs, 2 cats, and 27 chickens. Moved from [address] in April 2020     Social Determinants of Health     Financial Resource Strain: Low Risk     Difficulty of Paying Living Expenses: Not hard at all   Food Insecurity: No Food Insecurity    Worried About Running Out of Food in the Last Year: Never true    [address] in the Last Year: Never true   Transportation Needs:     Lack of Transportation (Medical): Not on file    Lack of Transportation (Non-Medical):  Not on file   Physical Activity:     Days of Exercise per Week: Not on file    Minutes of Exercise per Session: Not on file   Stress:     Feeling of Stress : Not on file   Social Connections:     Frequency of Communication with Friends and Family: Not on file    Frequency of Social Gatherings with Friends and Family: Not on file    Attends Gnosticism Services: Not on file    Active Member of [address] [place] [address] or Organizations: Not on file    Attends Club or Organization Meetings: Not on file    Marital Status: Not on file   Intimate Partner Violence:     Fear of Current or Ex-Partner: Not on file    Emotionally Abused: Not on file    Physically Abused: Not on file    Sexually Abused: Not on file   Housing Stability:     Unable to Pay for Housing in the Last Year: Not on file    Number of [place] in the Last Year: Not on file    Unstable Housing in the Last Year: Not on file       Health Maintenance Completed:  Health Maintenance   Topic Date Due    HIV screen  Never done    Hepatitis C screen  Never done    DTaP/Tdap/Td vaccine (1 - Tdap) Never done    Pap smear  04/02/2022    Flu vaccine (Season Ended) 09/01/2022    Depression Monitoring  04/24/2023    COVID-19 Vaccine  Completed    Hepatitis A vaccine  Aged Out    Hepatitis B vaccine Aged Out    Hib vaccine  Aged Out    Meningococcal (ACWY) vaccine  Aged Out    Pneumococcal 0-64 years Vaccine  Aged Out    Varicella vaccine  Discontinued          Immunization History   Administered Date(s) Administered    COVID-19, J&J, PF, 0.5 mL 04/09/2021    COVID-19, [employer] top, DO NOT Dilute, Isidro-Sucrose, 12+ yrs, PF, 30 mcg/0.3 mL dose 01/21/2022       Review of Systems   Constitutional: Negative for chills, diaphoresis, fever and unexpected weight change. HENT: Negative for congestion, rhinorrhea, sore throat and trouble swallowing. Eyes: Negative for pain, discharge and visual disturbance. Respiratory: Negative for chest tightness, shortness of breath and wheezing. Cardiovascular: Negative for chest pain, palpitations and leg swelling. Gastrointestinal: Negative for abdominal pain, blood in stool, constipation and diarrhea. Endocrine: Negative for polyuria. Genitourinary: Negative for difficulty urinating, dysuria and flank pain. Musculoskeletal: Negative for arthralgias, back pain and neck pain. Skin: Negative for color change, rash and wound. Allergic/Immunologic: Positive for environmental allergies. Negative for food allergies. Neurological: Negative for dizziness, syncope, speech difficulty, weakness, light-headedness and headaches. Hematological: Negative for adenopathy. Does not bruise/bleed easily. Psychiatric/Behavioral: Positive for decreased concentration (improved) and dysphoric mood. Negative for sleep disturbance. The patient is nervous/anxious. Vitals:    04/28/22 0920   BP: 108/70   Pulse: 70   Temp: 97.8 °F (36.6 °C)   TempSrc: Temporal   SpO2: 100%   Weight: 136 lb 12.8 oz (62.1 kg)   Height: 5' 2\" (1.575 m)     Body mass index is 25.02 kg/m².     Wt Readings from Last 3 Encounters:   04/28/22 136 lb 12.8 oz (62.1 kg)   04/14/22 136 lb 3.2 oz (61.8 kg)   01/18/22 138 lb (62.6 kg)       BP Readings from Last 3 Encounters:   04/28/22 108/70 04/14/22 112/70   01/04/22 (!) 107/54       Physical Exam  Constitutional:       General: She is not in acute distress. Appearance: She is well-developed. HENT:      Head: Normocephalic and atraumatic. Right Ear: Tympanic membrane normal.      Left Ear: Tympanic membrane normal.      Nose: Nose normal. No rhinorrhea. Mouth/Throat:      Pharynx: Uvula midline. Eyes:      Pupils: Pupils are equal, round, and reactive to light. Neck:      Trachea: No tracheal deviation. Cardiovascular:      Rate and Rhythm: Normal rate and regular rhythm. Heart sounds: Normal heart sounds. No murmur heard. No friction rub. No gallop. Pulmonary:      Effort: Pulmonary effort is normal. No respiratory distress. Breath sounds: Normal breath sounds. No wheezing or rales. Abdominal:      General: Bowel sounds are normal. There is no distension. Palpations: Abdomen is soft. Tenderness: There is no abdominal tenderness. There is no rebound. Musculoskeletal:         General: No tenderness. Normal range of motion. Lymphadenopathy:      Cervical: No cervical adenopathy. Skin:     General: Skin is warm and dry. Findings: No erythema or rash. Neurological:      Mental Status: She is alert and oriented to person, place, and time. Cranial Nerves: No cranial nerve deficit. Deep Tendon Reflexes:      Reflex Scores:       Patellar reflexes are 2+ on the right side and 2+ on the left side. Psychiatric:         Mood and Affect: Mood is anxious and depressed. Speech: Speech normal.         Behavior: Behavior normal.         Thought Content: Thought content does not include homicidal or suicidal ideation.             Lab Review:   Lab Results   Component Value Date     02/24/2021    K 3.9 02/24/2021     02/24/2021    CO2 25 02/24/2021    BUN 10 02/24/2021    CREATININE 0.6 02/24/2021    GLUCOSE 62 02/24/2021    CALCIUM 9.5 02/24/2021     Lab Results   Component Value Date    WBC 5.8 02/24/2021    HGB 13.1 02/24/2021    HCT 37.6 02/24/2021    MCV 89.1 02/24/2021     02/24/2021     Lab Results   Component Value Date    CHOL 196 02/24/2021    TRIG 101 02/24/2021    HDL 58 02/24/2021     No labs today  Patient declines Hep C and HIV screenings     Assessment/Plan:  Kaylyn Schlatter is 1 [facility] y/o female who was seen today for annual exam, depression, adhd, allergies and health maintenance. 1. Annual physical exam  -Chart/records reviewed, history and physical performed, health maintenance addressed and updated, presenting problems addressed. -Recommend 150 minutes of cardiovascular activity a week, or 10,000 to 15,000 steps a day, 2 days of weightbearing  -avoid processed/refined carbohydrates (boxed/canned/frozen/fast)  -encourage healthy protein and fat, butter, avocado, egg    2. Severe episode of recurrent major depressive disorder, without psychotic features ([facility] [address]  Mood improving in past 2 weeks; continue CBT; continue wellbutrin 300 mg daily; monitor side effects, CV activity/exercise recommended as above  - buPROPion (WELLBUTRIN XL) 300 MG extended release tablet; Take 1 tablet by mouth every morning  Dispense: 90 tablet; Refill: 1    3. Environmental and seasonal allergies  Singulair 10 mg PO daily PRN    4. HIV screening declined  Declines HIV screening today    5.  Screening for hepatitis C declined  Declines Hepatitis C screening today    Health Maintenance Due:  Health Maintenance Due   Topic Date Due    HIV screen  Never done    Hepatitis C screen  Never done    DTaP/Tdap/Td vaccine (1 - Tdap) Never done    Pap smear  04/02/2022          Health Maintenance:  (F) Cervical Cancer Screen: pap smear 2019, had repeat 2020 at [facility] per patient    HIV Screen: (12-76 yr old, and all pregnant patients): deferred    Hep C Screen: (18-79 yr old): deferred     Immunizations:  States has had TDaP in past 10 years  Had J/J covid vaccine    and Oscar Carmona covid booster    Return in about 6 weeks (around 6/9/2022) for mood/med check. with Dr. Sidney Walden or Belle Blackmon. Suzanne Quick., DO      EMR Dragon/transcription disclaimer:  Much of this encounter note is electronic transcription/translation of spoken language to printed texts. The electronic translation of spoken language may be erroneous, or at times, nonsensical words or phrases may be inadvertently transcribed. Although I have reviewed the note for such errors, some may still exist.       Belle Blackmon.  Suzanne Quick., DO     4/28/2022

## 2022-04-28 NOTE — Clinical Note
[facility]  [address]  [address] [address]  Phone: [phone]  Fax: [phone]    Shashi Ventura.,     April 28, 2022     Jeff Tenorio DO  [address] [facility] [address]    Patient: Gareth Laguerre   MR Number: [id number]   YOB: 1995   Date of Visit: 4/28/2022       Dear Jeff Tenorio: Thank you for referring Gareth Laguerre to me for evaluation/treatment. Below are the relevant portions of my assessment and plan of care. If you have questions, please do not hesitate to call me. I look forward to following Odalys along with you. Sincerely,      Shashi Ventura., DO

## 2022-04-28 NOTE — Clinical Note
Get pap smear records from [id number]  Cervical Cancer Screening:  Pap smear done at most recent OBGYN visit in past 2 years per patient, will get records for us as not sure where she had these done

## 2022-04-28 NOTE — PATIENT INSTRUCTIONS
-Continue Wellbutrin  mg daily    -if have any records of home of who did PAP smear and those things let us know so we can put on file    -Recommend 150 minutes of cardiovascular activity a week, or 10,000 to 15,000 steps a day, 2 days of weightbearing  -avoid processed/refined carbohydrates (boxed/canned/frozen/fast)  -Encourage healthy protein and fat, butter, avocado, egg      Continue to see Dr. Loan Antonio for now    Follow up with me or Dr. Jossie David in 6 weeks for mood and med check, reach out if side effects/tolerability don't improve before that

## 2022-05-18 ENCOUNTER — OFFICE VISIT (OUTPATIENT)
Dept: INTERNAL MEDICINE CLINIC | Age: 27
End: 2022-05-18
Payer: COMMERCIAL

## 2022-05-18 VITALS
HEIGHT: 62 IN | BODY MASS INDEX: 24.29 KG/M2 | SYSTOLIC BLOOD PRESSURE: 102 MMHG | HEART RATE: 78 BPM | OXYGEN SATURATION: 99 % | WEIGHT: 132 LBS | DIASTOLIC BLOOD PRESSURE: 62 MMHG

## 2022-05-18 DIAGNOSIS — J30.2 SEASONAL ALLERGIES: ICD-10-CM

## 2022-05-18 DIAGNOSIS — F33.2 SEVERE EPISODE OF RECURRENT MAJOR DEPRESSIVE DISORDER, WITHOUT PSYCHOTIC FEATURES (HCC): Primary | ICD-10-CM

## 2022-05-18 DIAGNOSIS — F90.2 ATTENTION DEFICIT HYPERACTIVITY DISORDER (ADHD), COMBINED TYPE: ICD-10-CM

## 2022-05-18 DIAGNOSIS — H60.91 OTITIS EXTERNA OF RIGHT EAR, UNSPECIFIED CHRONICITY, UNSPECIFIED TYPE: ICD-10-CM

## 2022-05-18 PROCEDURE — 99215 OFFICE O/P EST HI 40 MIN: CPT | Performed by: INTERNAL MEDICINE

## 2022-05-18 RX ORDER — MONTELUKAST SODIUM 10 MG/1
10 TABLET ORAL DAILY
Qty: 30 TABLET | Refills: 5 | Status: SHIPPED | OUTPATIENT
Start: 2022-05-18 | End: 2022-08-18

## 2022-05-18 NOTE — PROGRESS NOTES
5/18/2022    Brielle Gomes  YOB: 1995    ASSESSMENT/PLAN:   Severe episode of recurrent major depressive disorder, without psychotic features ([facility] [address]  Assessment & Plan:  Chronic condition, with significant improvement with change in therapy from SSRI to WB XL. Continue WB  mg daily. Provided names of local therapists. Will restart therapy locally. Otitis externa of right ear, unspecified chronicity, unspecified type  Assessment & Plan:  Recurrent condition. Needs debris in canal removed to optimize ability to treat. Patient will call to see if able to see Dr. Ashley Black before end of week. If not, will send in prescription to start until able to be seen by ENT. Due to perforation earlier this year and inability to view TM, will treat with oral med. Seasonal allergies  Assessment & Plan:  Symptoms primarily in summer, not currently symptomatic. Manages seasonally with OTC antihistamine and singulair. Rx for singulair put on file with pharmacy,    Attention deficit hyperactivity disorder (ADHD), combined type  Assessment & Plan:  Reports symptoms improved and tolerable since starting WB XL. Will not add stimulant at this time. Patient will message with name of post recent gynecologist we can request Pap. Out of T dap today. Can return to office at her convenience we will get a local pharmacy. Return in about 6 months (around 11/18/2022). On this date 5/18/2022 I have spent 45 minutes reviewing previous notes, test results and face to face with the patient discussing the diagnosis and importance of compliance with the treatment plan as well as documenting on the day of the visit. CC:   Chief Complaint   Patient presents with    New Patient       HPI: 28 y.o. female here today to establish care and assess status of chronic medical problems. [facility] [place]. Moved from [place], [place] about 2 years ago.  to [place] son.   Degree in sociology, used to work for "[employer] in [facility]. 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 Treated for depression. [place] about 2 months and med was changed by previous doctor to [facility] which is working well. SSRI stop working for her after some time. Has used zoloft, celexa and lexapro. Had lot of fatigue with  celexa. Also recently diagnosed with ADHD by behavioral health specialist at last practice. Was going to wait to treat until depression controlled. WB XL seems to be helping with both. Seasonal allergies, usually in the summer. Typically uses montelukast and antihistamine. Right ear bothering. Feels like something on ear drum. Has seen Dr. Scott Vazquez earlier this year for similar issues. Smoked in past year, 2-3 cigs/day. Urge stopped with WB XL and has completely stopped. Weight stable in last few years, but higher, in early 20's was in 116 range. Menses regular. Has seen a gynecologist here in the [facility]. Fertility issues but eval was negative. Past Medical History:   Diagnosis Date    Anxiety     Depression     Environmental and seasonal allergies 2017    Headache     Irregular menses 08/26/2020    Sprain of anterior talofibular ligament of right ankle 2020       Past Surgical History:   Procedure Laterality Date    WISDOM TOOTH EXTRACTION         Family Hx:      Problem Relation Age of Onset    No Known Problems Mother     Kidney stones Father     Other Father         polymyalgia rheumatica, diverticulitis    No Known Problems Sister     No Known Problems Brother        Social History     Tobacco Use    Smoking status: Never Smoker    Smokeless tobacco: Never Used    Tobacco comment: smoked 1 year, 2-3 cig/day 2021-22   Substance Use Topics    Alcohol use:  Yes     Alcohol/week: 6.0 standard drinks     Types: 3 Glasses of wine, 3 Cans of beer per week       Review of Systems  As documented in HPI and patient questionnaire (scanned)    Current Outpatient Medications   Medication Sig Dispense Refill    montelukast (SINGULAIR) 10 MG tablet Take 1 tablet by mouth daily 30 tablet 5    buPROPion (WELLBUTRIN XL) 300 MG extended release tablet Take 1 tablet by mouth every morning 90 tablet 1     No current facility-administered medications for this visit. Physical Exam  Vitals:    05/18/22 0930   BP: 102/62   Pulse: 78   SpO2: 99%   Weight: 132 lb (59.9 kg)   Height: 5' 2\" (1.575 m)     Body mass index is 24.14 kg/m². Physical Exam  Constitutional:       Appearance: Normal appearance. She is well-developed. HENT:      Left Ear: Tympanic membrane, ear canal and external ear normal.      Ears:      Comments: R EAC patent but erythematous and mildly swollen with white waxy -appearing debris or exudate along canal and TM     Mouth/Throat:      Mouth: Mucous membranes are moist.      Pharynx: Oropharynx is clear. Eyes:      Pupils: Pupils are equal, round, and reactive to light. Neck:      Thyroid: No thyromegaly. Vascular: No carotid bruit. Trachea: No tracheal deviation. Cardiovascular:      Rate and Rhythm: Normal rate and regular rhythm. Heart sounds: Normal heart sounds. No murmur heard. Pulmonary:      Effort: Pulmonary effort is normal.      Breath sounds: Normal breath sounds. No wheezing or rales. Abdominal:      General: Bowel sounds are normal. There is no distension. Palpations: Abdomen is soft. There is no mass. Tenderness: There is no abdominal tenderness. Musculoskeletal:      Right lower leg: No edema. Left lower leg: No edema. Lymphadenopathy:      Cervical: No cervical adenopathy. Neurological:      General: No focal deficit present. Mental Status: She is alert.    Psychiatric:         Behavior: Behavior normal.      Comments: Affect slightly flat, mood a little sad         PHQ Scores 4/28/2022 4/24/2022 4/14/2022 2/28/2022 1/13/2021 10/7/2020 8/26/2020   PHQ2 Score 1 4 5 4 6 2 3   PHQ9 Score 4 16 17 13 24 7 8     Interpretation of Total Score Depression Severity: 1-4 = Minimal depression, 5-9 = Mild depression, 10-14 = Moderate depression, 15-19 = Moderately severe depression, 20-27 = Severe depression    This note was generated completely or in part utilizing Dragon dictation speech recognition software. Occasionally, words are mistranscribed and despite editing, the text may contain inaccuracies due to incorrect word recognition.   If further clarification is needed please contact the office at [phone]    --Todd Silvestre MD

## 2022-05-18 NOTE — PATIENT INSTRUCTIONS
[facility] office)  [facility] (they do DBT if needed)   [address] [facility] in [place]. KARTHIK Werner,   KARTHIK [place].

## 2022-05-19 ENCOUNTER — TELEPHONE (OUTPATIENT)
Dept: INTERNAL MEDICINE CLINIC | Age: 27
End: 2022-05-19

## 2022-05-19 PROBLEM — F90.2 ATTENTION DEFICIT HYPERACTIVITY DISORDER (ADHD), COMBINED TYPE: Status: ACTIVE | Noted: 2022-05-19

## 2022-05-19 RX ORDER — AMOXICILLIN AND CLAVULANATE POTASSIUM 875; 125 MG/1; MG/1
1 TABLET, FILM COATED ORAL 2 TIMES DAILY
Qty: 14 TABLET | Refills: 0 | Status: SHIPPED | OUTPATIENT
Start: 2022-05-19 | End: 2022-05-26

## 2022-05-19 NOTE — TELEPHONE ENCOUNTER
Please let her know I ordered an antibiotic, but she should still call to schedule appointment with Dr. Rosalba VALDIVIA. I think this will come back if not addressed fully to clear out the ear canal. If she has any difficulty getting appointment, have her let us know.

## 2022-05-19 NOTE — ASSESSMENT & PLAN NOTE
Symptoms primarily in summer, not currently symptomatic. Manages seasonally with OTC antihistamine and singulair.  Rx for singulair put on file with pharmacy,

## 2022-05-19 NOTE — TELEPHONE ENCOUNTER
Please call her. Has she had a chance to schedule with Dr. Quiles Graft ENT? If not able to get in this week, then I want to start her on antibiotic. Will still need to see him, because needs ear canal cleaned and check of ear drum.

## 2022-05-19 NOTE — ASSESSMENT & PLAN NOTE
Recurrent condition. Needs debris in canal removed to optimize ability to treat. Patient will call to see if able to see Dr. Agusto Donovan before end of week. If not, will send in prescription to start until able to be seen by ENT. Due to perforation earlier this year and inability to view TM, will treat with oral med.

## 2022-05-19 NOTE — ASSESSMENT & PLAN NOTE
Chronic condition, with significant improvement with change in therapy from SSRI to WB XL. Continue WB  mg daily. Provided names of local therapists. Will restart therapy locally.

## 2022-05-19 NOTE — TELEPHONE ENCOUNTER
Patient has not seen Dr Andrzej Avalos and does not think she will  Be able to get in this week at all either.

## 2022-05-23 ASSESSMENT — ENCOUNTER SYMPTOMS
COUGH: 0
NAUSEA: 0
RHINORRHEA: 0
SHORTNESS OF BREATH: 0
VOMITING: 0
EYE PAIN: 0

## 2022-05-23 NOTE — PROGRESS NOTES
Attention deficit hyperactivity disorder (ADHD), combined type     Past Surgical History:   Procedure Laterality Date    WISDOM TOOTH EXTRACTION       Family History   Problem Relation Age of Onset    No Known Problems Mother     Kidney stones Father     Other Father         polymyalgia rheumatica, diverticulitis    No Known Problems Sister     No Known Problems Brother      Social History     Socioeconomic History    Marital status:      Spouse name: Eagle Joseph Number of children: Not on file    Years of education: 12    Highest education level: Bachelor's degree (e.g., BA, AB, BS)   Occupational History    Occupation: farmer    Occupation: bachelors degree in sociology   Tobacco Use    Smoking status: Never Smoker    Smokeless tobacco: Never Used    Tobacco comment: smoked 1 year, 2-3 cig/day 2021-22   Vaping Use    Vaping Use: Never used   Substance and Sexual Activity    Alcohol use: Yes     Alcohol/week: 6.0 standard drinks     Types: 3 Glasses of wine, 3 Cans of beer per week    Drug use: Never    Sexual activity: Yes     Partners: Male   Other Topics Concern    Not on file   Social History Narrative    She is a . She is  and is currently trying to conceive. She has 3 dogs, 2 cats, and 27 chickens. Moved from [place] in April 2020     Social Determinants of Health     Financial Resource Strain: Low Risk     Difficulty of Paying Living Expenses: Not hard at all   Food Insecurity: No Food Insecurity    Worried About Running Out of Food in the Last Year: Never true    [address] in the Last Year: Never true   Transportation Needs:     Lack of Transportation (Medical): Not on file    Lack of Transportation (Non-Medical):  Not on file   Physical Activity:     Days of Exercise per Week: Not on file    Minutes of Exercise per Session: Not on file   Stress:     Feeling of Stress : Not on file   Social Connections:     Frequency of Communication with Friends and Family: Not on file    Frequency of Social Gatherings with Friends and Family: Not on file    Attends Roman Catholic Services: Not on file    Active Member of Clubs or Organizations: Not on file    Attends Club or Organization Meetings: Not on file    Marital Status: Not on file   Intimate Partner Violence:     Fear of Current or Ex-Partner: Not on file    Emotionally Abused: Not on file    Physically Abused: Not on file    Sexually Abused: Not on file   Housing Stability:     Unable to Pay for Housing in the Last Year: Not on file    Number of [place] in the Last Year: Not on file    Unstable Housing in the Last Year: Not on file       DRUG/FOOD ALLERGIES: Patient has no known allergies. CURRENT MEDICATIONS  Prior to Admission medications    Medication Sig Start Date End Date Taking? Authorizing Provider   ciprofloxacin (CILOXAN) 0.3 % ophthalmic solution Place 4 drops in ear(s) 2 times daily for 5 days 4 drops right ear bid for 5 days 5/24/22 5/29/22 Yes Storm Guillermo DO   dexamethasone (DECADRON) 0.1 % ophthalmic solution Apply 4 drops to eye in the morning and at bedtime for 5 days 4 drops to the right ear 2 times a day for 5 days 5/24/22 5/29/22 Yes Storm Guillermo DO   amoxicillin-clavulanate (AUGMENTIN) 875-125 MG per tablet Take 1 tablet by mouth 2 times daily for 7 days 5/19/22 5/26/22 Yes Lois Vasquez MD   montelukast (SINGULAIR) 10 MG tablet Take 1 tablet by mouth daily 5/18/22  Yes Losi Vasquez MD   buPROPion (WELLBUTRIN XL) 300 MG extended release tablet Take 1 tablet by mouth every morning 4/28/22 10/25/22 Yes Kay Goldberg., DO       REVIEW OF SYSTEMS  The following systems were reviewed and revealed the following in addition to any already discussed in the HPI:    Review of Systems   Constitutional: Negative for fatigue and fever. HENT: Positive for ear discharge and hearing loss.  Negative for congestion, ear pain, postnasal drip, rhinorrhea and sneezing. Eyes: Negative for pain and visual disturbance. Respiratory: Negative for cough and shortness of breath. Cardiovascular: Negative for chest pain. Gastrointestinal: Negative for nausea and vomiting. Endocrine: Negative. Genitourinary: Negative. Musculoskeletal: Negative for neck pain and neck stiffness. Skin: Negative for rash. Neurological: Negative for dizziness and headaches. PHYSICAL EXAM  BP (!) 93/59 (Site: Left Upper Arm, Position: Sitting, Cuff Size: Medium Adult)   Pulse 79   Temp 97.5 °F (36.4 °C) (Infrared)   Ht 5' 2\" (1.575 m)   Wt 135 lb 3.2 oz (61.3 kg)   BMI 24.73 kg/m²     GENERAL: No Acute Distress, Alert and Oriented, no hoarseness  EYES: EOMI, Anti-icteric  NOSE: No epistaxis, nasal mucosa within normal limits, no purulent drainage  EARS: Normal external canal appearance, EAC patent on left and TM intact without evidence of effusion, EAC with wax and debris from previous infection on eardrum  FACE: 1/6 House-Brackmann Scale, symmetric, sensation equal bilaterally  ORAL CAVITY: No masses or lesions palpated, uvula is midline, moist mucous membranes,   NECK: Normal range of motion, no thyromegaly, trachea is midline, no lymphadenopathy, no neck masses, no crepitus  CHEST: Normal respiratory effort, no retractions, breathing comfortably  SKIN: No rashes, normal appearing skin, no evidence of skin lesions/tumors    RADIOLOGY  Summary of findings:      PROCEDURE  Otomicroscopy and Debris Removal    An operating microscope was utilized to visualize the external auditory canals using a speculum. The external auditory canals where occluded with debris on the right. The debris was removed with instrumentation under microscopic evaluation. The right tympanic membrane is covered in purulence with suspected perforation       ASSESSMENT/PLAN  Pato Santana is a very pleasant 28 y.o. female with    1.  Other noninfective acute otitis externa of right ear  Patient's ear was cleaned out under maximal visualization. We will start her on Floxin  Eardrops as Ciprodex not covered by her insurance. - ciprofloxacin (CILOXAN) 0.3 % ophthalmic solution; Place 4 drops in ear(s) 2 times daily for 5 days 4 drops right ear bid for 5 days  Dispense: 2.5 mL; Refill: 0  - dexamethasone (DECADRON) 0.1 % ophthalmic solution; Apply 4 drops to eye in the morning and at bedtime for 5 days 4 drops to the right ear 2 times a day for 5 days  Dispense: 2.5 mL; Refill: 0  - [place] EAR MICROSCOPY EXAMINATION    2. Hearing loss of right ear, unspecified hearing loss type  We will treat her and then potentially obtain a hearing test if it does not improve    She will follow-up in 1 to 2 weeks. Medical Decision Making:   The following items were considered in medical decision making:  Independent review of images  Review / order clinical lab tests  Review / order radiology tests  Decision to obtain old records

## 2022-05-24 ENCOUNTER — OFFICE VISIT (OUTPATIENT)
Dept: ENT CLINIC | Age: 27
End: 2022-05-24
Payer: COMMERCIAL

## 2022-05-24 ENCOUNTER — PATIENT MESSAGE (OUTPATIENT)
Dept: INTERNAL MEDICINE CLINIC | Age: 27
End: 2022-05-24

## 2022-05-24 VITALS
TEMPERATURE: 97.5 F | SYSTOLIC BLOOD PRESSURE: 93 MMHG | WEIGHT: 135.2 LBS | HEART RATE: 79 BPM | BODY MASS INDEX: 24.88 KG/M2 | DIASTOLIC BLOOD PRESSURE: 59 MMHG | HEIGHT: 62 IN

## 2022-05-24 DIAGNOSIS — F90.2 ATTENTION DEFICIT HYPERACTIVITY DISORDER (ADHD), COMBINED TYPE: Primary | ICD-10-CM

## 2022-05-24 DIAGNOSIS — H60.591 OTHER NONINFECTIVE ACUTE OTITIS EXTERNA OF RIGHT EAR: Primary | ICD-10-CM

## 2022-05-24 DIAGNOSIS — H91.91 HEARING LOSS OF RIGHT EAR, UNSPECIFIED HEARING LOSS TYPE: ICD-10-CM

## 2022-05-24 PROCEDURE — 92504 EAR MICROSCOPY EXAMINATION: CPT | Performed by: STUDENT IN AN ORGANIZED HEALTH CARE EDUCATION/TRAINING PROGRAM

## 2022-05-24 PROCEDURE — 99214 OFFICE O/P EST MOD 30 MIN: CPT | Performed by: STUDENT IN AN ORGANIZED HEALTH CARE EDUCATION/TRAINING PROGRAM

## 2022-05-24 RX ORDER — CIPROFLOXACIN HYDROCHLORIDE 3.5 MG/ML
4 SOLUTION/ DROPS TOPICAL 2 TIMES DAILY
Qty: 2.5 ML | Refills: 0 | Status: SHIPPED | OUTPATIENT
Start: 2022-05-24 | End: 2022-05-29

## 2022-05-24 RX ORDER — DEXAMETHASONE SODIUM PHOSPHATE 1 MG/ML
4 SOLUTION/ DROPS OPHTHALMIC 2 TIMES DAILY
Qty: 2.5 ML | Refills: 0 | Status: SHIPPED | OUTPATIENT
Start: 2022-05-24 | End: 2022-05-29

## 2022-05-25 NOTE — TELEPHONE ENCOUNTER
From: Bobby Sunshine  To: Dr. Tanna Nettles  Sent: 5/24/2022 7:46 PM EDT  Subject: Afternoon Fatigue    Hi Dr. Osito Strange,     I hope your week is off to a great start! I am reaching out because I am concerned I am going into a bad cycle again with sleep/depression. The past 3 days I have had to lay down for around 4 hours in the afternoons after becoming exhausted (mentally and physically). When I wake up any tasks I do I feel a huge lack of focus to complete them and frankly feel all over the place and extremely guilty after sleeping during the day when there is lots to do. Do you think it would help if I started taking my medication later in the day? Any insight is helpful- thank you so much for your time.

## 2022-05-31 RX ORDER — DEXTROAMPHETAMINE SACCHARATE, AMPHETAMINE ASPARTATE MONOHYDRATE, DEXTROAMPHETAMINE SULFATE AND AMPHETAMINE SULFATE 5; 5; 5; 5 MG/1; MG/1; MG/1; MG/1
20 CAPSULE, EXTENDED RELEASE ORAL EVERY MORNING
Qty: 30 CAPSULE | Refills: 0 | Status: SHIPPED | OUTPATIENT
Start: 2022-05-31 | End: 2022-07-03 | Stop reason: SDUPTHER

## 2022-06-30 ENCOUNTER — TELEMEDICINE (OUTPATIENT)
Dept: INTERNAL MEDICINE CLINIC | Age: 27
End: 2022-06-30
Payer: COMMERCIAL

## 2022-06-30 DIAGNOSIS — F33.40 RECURRENT MAJOR DEPRESSIVE DISORDER, IN REMISSION (HCC): ICD-10-CM

## 2022-06-30 DIAGNOSIS — F90.2 ATTENTION DEFICIT HYPERACTIVITY DISORDER (ADHD), COMBINED TYPE: Primary | ICD-10-CM

## 2022-06-30 PROCEDURE — 99213 OFFICE O/P EST LOW 20 MIN: CPT | Performed by: INTERNAL MEDICINE

## 2022-06-30 NOTE — ASSESSMENT & PLAN NOTE
Mildly symptomatic (irritability, fatigue), off WBXL due to side effects with Adderal. Discussed adding low dose SSRI back, resuming WB XL at lower dose. Patient preference is to reman on adderal XR alone for now to better self-assess. Has seasonal depression component, so discussed at least consideration over winter.

## 2022-06-30 NOTE — ASSESSMENT & PLAN NOTE
Symptoms improved with adderal XR 20 mg. May need slight increase in dose, but patient wants more time at current dose to determine if expectations versus focus as issue.

## 2022-06-30 NOTE — PROGRESS NOTES
TELEHEALTH EVALUATION -- Audio/Visual (During BXIQG-71 public health emergency)  2022  Shae Barbour (: 1995)      ASSESSMENT/PLAN:  Attention deficit hyperactivity disorder (ADHD), combined type  Assessment & Plan:  Symptoms improved with adderal XR 20 mg. May need slight increase in dose, but patient wants more time at current dose to determine if expectations versus focus as issue. Recurrent major depressive disorder, in remission [facility])  Assessment & Plan:  Mildly symptomatic (irritability, fatigue), off WBXL due to side effects with Adderal. Discussed adding low dose SSRI back, resuming WB XL at lower dose. Patient preference is to reman on adderal XR alone for now to better self-assess. Has seasonal depression component, so discussed at least consideration over winter. Return for ADHD/depression in 2-3 months. SUBJECTIVE/OBJECTIVE:  28 y.o. female here for evaluation of the following chief complaint(s):   Chief Complaint   Patient presents with    Medication Check     depression, adhd     Started ADderal xr approx 1 month ago,  Didn't feel well when added adderal to the WB. [place] like was going crazy. Out of control of emotion, irritable crying all of the time. Stopped the wellbutrin after a week and doing much better. Focus improved but still not great. Not sure if still trying to do too much or not focusing well enough. Needs more time to assess. Sleeping fine, still an issue with waking up in morning. At least 8 hours, good quality. At end of day, feels mentally drained by 9. Irritable by 6pm.      Review of Systems    No flowsheet data found. Physical Exam  Constitutional:       Appearance: Normal appearance. Psychiatric:         Mood and Affect: Mood normal.         Shae Barbour, was evaluated through a synchronous (real-time) audio-video encounter.  The patient (or guardian if applicable) is aware that this is a billable service, which includes applicable co-pays. This Virtual Visit was conducted with patient's (and/or legal guardian's) consent. The visit was conducted pursuant to the emergency declaration under the [address] [place] [address] authority and the [email or web address] and [employer] General Act. Patient identification was verified, and a caregiver was present when appropriate. The patient was located at Home: [address] [place] [address]. Provider was located at Home ([place] [address]): [facility]. Total time spent for this encounter: Not billed by time    --Kandra Hodgkin, MD on 6/30/2022 at 9:28 AM    An electronic signature was used to authenticate this note. An electronic signature was used to authenticate this note.     Kandra Hodgkin, MD

## 2022-07-03 DIAGNOSIS — F90.2 ATTENTION DEFICIT HYPERACTIVITY DISORDER (ADHD), COMBINED TYPE: ICD-10-CM

## 2022-07-03 RX ORDER — DEXTROAMPHETAMINE SACCHARATE, AMPHETAMINE ASPARTATE MONOHYDRATE, DEXTROAMPHETAMINE SULFATE AND AMPHETAMINE SULFATE 5; 5; 5; 5 MG/1; MG/1; MG/1; MG/1
20 CAPSULE, EXTENDED RELEASE ORAL EVERY MORNING
Qty: 30 CAPSULE | Refills: 0 | Status: SHIPPED | OUTPATIENT
Start: 2022-07-03 | End: 2022-07-26 | Stop reason: ALTCHOICE

## 2022-07-26 ENCOUNTER — OFFICE VISIT (OUTPATIENT)
Dept: INTERNAL MEDICINE CLINIC | Age: 27
End: 2022-07-26
Payer: COMMERCIAL

## 2022-07-26 VITALS
BODY MASS INDEX: 23.92 KG/M2 | DIASTOLIC BLOOD PRESSURE: 64 MMHG | HEIGHT: 62 IN | HEART RATE: 76 BPM | WEIGHT: 130 LBS | OXYGEN SATURATION: 99 % | SYSTOLIC BLOOD PRESSURE: 122 MMHG

## 2022-07-26 DIAGNOSIS — F33.2 SEVERE EPISODE OF RECURRENT MAJOR DEPRESSIVE DISORDER, WITHOUT PSYCHOTIC FEATURES (HCC): Primary | ICD-10-CM

## 2022-07-26 DIAGNOSIS — F90.2 ATTENTION DEFICIT HYPERACTIVITY DISORDER (ADHD), COMBINED TYPE: ICD-10-CM

## 2022-07-26 PROCEDURE — 99214 OFFICE O/P EST MOD 30 MIN: CPT | Performed by: INTERNAL MEDICINE

## 2022-07-26 PROCEDURE — 90715 TDAP VACCINE 7 YRS/> IM: CPT | Performed by: INTERNAL MEDICINE

## 2022-07-26 PROCEDURE — 90471 IMMUNIZATION ADMIN: CPT | Performed by: INTERNAL MEDICINE

## 2022-07-26 RX ORDER — ESCITALOPRAM OXALATE 10 MG/1
TABLET ORAL
Qty: 30 TABLET | Refills: 0 | Status: SHIPPED | OUTPATIENT
Start: 2022-07-26 | End: 2022-08-26

## 2022-07-26 RX ORDER — DEXTROAMPHETAMINE SACCHARATE, AMPHETAMINE ASPARTATE MONOHYDRATE, DEXTROAMPHETAMINE SULFATE AND AMPHETAMINE SULFATE 6.25; 6.25; 6.25; 6.25 MG/1; MG/1; MG/1; MG/1
25 CAPSULE, EXTENDED RELEASE ORAL EVERY MORNING
Qty: 30 CAPSULE | Refills: 0 | Status: SHIPPED | OUTPATIENT
Start: 2022-07-26 | End: 2022-09-08 | Stop reason: SDUPTHER

## 2022-07-26 ASSESSMENT — PATIENT HEALTH QUESTIONNAIRE - PHQ9
SUM OF ALL RESPONSES TO PHQ QUESTIONS 1-9: 16
3. TROUBLE FALLING OR STAYING ASLEEP: 3
6. FEELING BAD ABOUT YOURSELF - OR THAT YOU ARE A FAILURE OR HAVE LET YOURSELF OR YOUR FAMILY DOWN: 2
SUM OF ALL RESPONSES TO PHQ QUESTIONS 1-9: 16
5. POOR APPETITE OR OVEREATING: 0
8. MOVING OR SPEAKING SO SLOWLY THAT OTHER PEOPLE COULD HAVE NOTICED. OR THE OPPOSITE, BEING SO FIGETY OR RESTLESS THAT YOU HAVE BEEN MOVING AROUND A LOT MORE THAN USUAL: 0
SUM OF ALL RESPONSES TO PHQ9 QUESTIONS 1 & 2: 6
9. THOUGHTS THAT YOU WOULD BE BETTER OFF DEAD, OR OF HURTING YOURSELF: 1
2. FEELING DOWN, DEPRESSED OR HOPELESS: 3
1. LITTLE INTEREST OR PLEASURE IN DOING THINGS: 3
SUM OF ALL RESPONSES TO PHQ QUESTIONS 1-9: 16
SUM OF ALL RESPONSES TO PHQ QUESTIONS 1-9: 15
4. FEELING TIRED OR HAVING LITTLE ENERGY: 3
7. TROUBLE CONCENTRATING ON THINGS, SUCH AS READING THE NEWSPAPER OR WATCHING TELEVISION: 1
10. IF YOU CHECKED OFF ANY PROBLEMS, HOW DIFFICULT HAVE THESE PROBLEMS MADE IT FOR YOU TO DO YOUR WORK, TAKE CARE OF THINGS AT HOME, OR GET ALONG WITH OTHER PEOPLE: 3

## 2022-07-26 ASSESSMENT — COLUMBIA-SUICIDE SEVERITY RATING SCALE - C-SSRS
1. WITHIN THE PAST MONTH, HAVE YOU WISHED YOU WERE DEAD OR WISHED YOU COULD GO TO SLEEP AND NOT WAKE UP?: YES
2. HAVE YOU ACTUALLY HAD ANY THOUGHTS OF KILLING YOURSELF?: NO
6. HAVE YOU EVER DONE ANYTHING, STARTED TO DO ANYTHING, OR PREPARED TO DO ANYTHING TO END YOUR LIFE?: NO

## 2022-07-26 NOTE — ASSESSMENT & PLAN NOTE
Significant fluctuation in symptoms, typically doing well initially with weaning benefits over time. Did not tolerate Wellbutrin XL along with the Adderall. Has previously done well with Lexapro. Restart Lexapro 5 mg daily x1 week increasing to 10 mg. Continue Adderall XR. Patient to schedule with Dr. Anca Peters for medication optimization.

## 2022-07-26 NOTE — ASSESSMENT & PLAN NOTE
Symptomatic again. Initial benefit seen with the Adderall XR 20 has waned, likely partially due to the role of depression as well. Increase Adderall XR to 25 mg.   Addressing depression as above

## 2022-07-26 NOTE — PROGRESS NOTES
Marco eJtt   :  1995    ASSESSMENT/PLAN:   1. Severe episode of recurrent major depressive disorder, without psychotic features ([address]  Assessment & Plan:  Significant fluctuation in symptoms, typically doing well initially with weaning benefits over time. Did not tolerate Wellbutrin XL along with the Adderall. Has previously done well with Lexapro. Restart Lexapro 5 mg daily x1 week increasing to 10 mg. Continue Adderall XR. Patient to schedule with Dr. Catherine Sands for medication optimization. 2. Attention deficit hyperactivity disorder (ADHD), combined type  Assessment & Plan:  Symptomatic again. Initial benefit seen with the Adderall XR 20 has waned, likely partially due to the role of depression as well. Increase Adderall XR to 25 mg. Addressing depression as above    Orders:  -     amphetamine-dextroamphetamine (ADDERALL XR) 25 MG extended release capsule; Take 1 capsule by mouth every morning for 30 days. , Disp-30 capsule, R-0Normal    Return in about 1 month (around 2022) for ok for VV if need (can hold depending on when seeing Dr. Catherine Sands), depression. SUBJECTIVE     28 y.o. female established patient here for:   Chief Complaint   Patient presents with    Depression     Feeling more depressed. Realizing the treating the ADHD alone is not going to treat the depression. Struggles to make stuff get out of bed in the morning. The initial benefit she felt with the Adderall in terms of focus and task completion have largely dissipated. Denies any side effects from the Adderall. Review of Systems    Outpatient Medications Marked as Taking for the 22 encounter (Office Visit) with Mena Morton MD   Medication Sig Dispense Refill    amphetamine-dextroamphetamine (ADDERALL XR) 25 MG extended release capsule Take 1 capsule by mouth every morning for 30 days.  30 capsule 0    escitalopram (LEXAPRO) 10 MG tablet Take 0.5 tablets by mouth daily for 7 days, THEN 1 tablet daily for 23 days. 30 tablet 0       OBJECTIVE:  Vitals:    07/26/22 1556   BP: 122/64   Site: Right Upper Arm   Position: Sitting   Cuff Size: Medium Adult   Pulse: 76   SpO2: 99%   Weight: 130 lb (59 kg)   Height: 5' 2\" (1.575 m)     Physical Exam  Psychiatric:         Mood and Affect: Mood is depressed. Speech: Speech normal.         Behavior: Behavior normal.         Thought Content: Thought content normal.       This note was generated completely or in part utilizing Dragon dictation speech recognition software. Occasionally, words are mistranscribed and despite editing, the text may contain inaccuracies due to incorrect word recognition.   If further clarification is needed please contact the office at [phone]  --Monica Farris MD

## 2022-08-17 NOTE — PROGRESS NOTES
Health Maintenance Due   Topic Date Due   • IPV Vaccine (2 of 4 - 4-dose series) 2022   • HIB Vaccine (2 of 4 - Standard series) 2022   • DTaP/Tdap/Td Vaccine (2 - DTaP) 2022   • Well Child Exam 4 Months  2022   • Pneumococcal Vaccine 0-64 (2) 2022       Patient is due for the topics as listed above and wishes to proceed with them. Orders placed for Immunization(s) Dtap/Tdap/Td, Hep B, HIB, IPV and Pneumococcal.   PSYCHIATRY INITIAL EVALUATION    Satira Peabody  1995 08/18/22  Face to Face time: 60 minutes  PCP: Bishop Darren MD    CC: New Patient (depression)      ASSESSMENT:   Patient is a 71-year-old female without significant past medical history presents the outpatient psychiatric clinic today for evaluation management of depression and anxiety. Patient's presentation appears consistent with several psychiatric diagnoses. The first would be that of ADHD, combined type seen today. Patient is currently under medication treatment for this and her symptoms are generally well controlled. She was recently evaluated in 2022 and found to have combined type ADHD, had not been treated prior to this but did have symptoms that were consistent with it and the history is suggestive of such. Second diagnosis would be that major depressive disorder, recurrent moderate. Patient does have ongoing symptoms including appetite changes, sleep changes, energy/motivation changes, and some loss of interest in activities. These are appearing to be mildly improving with the recent initiation of escitalopram and will need to be followed over the course of time. Third diagnosis would be that of social anxiety disorder. Patient has a history of such going back into elementary school when she would attempt to evade going to school secondary to high anxiety. Much of this appears to be treated at present, with only some small remnants left. Diagnosis:  ADHD combined type  MDD-R moderate  Social Anxiety Disorder    PLAN:   1. Discussed with patient potential management options further conditions including medication management as well as nonpharmacologic strategies. Patient on board with current plan of care. 2.  We will plan to maintain the patient on her current medication regimen of Adderall XR 25 mg daily and escitalopram 10 mg daily      Medication Monitoring:    - PDMP reviewed:  Adderall XR 25 mg daily 30-day supply filled 7/26/2022. Previous prescription was filled at 23 days      Follow-up: RTC in 4 weeks    Safety: Pt was counseled on the potential for increased suicidal ideations and advised on potential options for dealing with these including hotlines, calling the office, or going to the nearest emergency room. ____________________________________________________________________________    HPI:   Patient is a 58-year-old female without significant past medical history presents the outpatient psychiatric clinic today for evaluation management of depression and anxiety. The patient noted that she's had difficulties with mental health issues for much of her life, however she only began seeking help for such over the course of the last 6-10 years. She noted that one of the first things she dealt with was a history of anxiety. Stretching back into grade school, the patient noted that she was very socially anxious. She noted that there would be days where she would feign an illness in order not to have to go to school, typically if they were asked to do a presentation or a group project that day. She was the shy child that did not raise her hand much in class. As she got older, she noted that sometimes when she'd be out with people in social settings she would add in some alcohol in order not to feel as anxious about being in public. Much of this social anxiety has gotten better. The social anxiety appeared to lead into a couple other issues. Because she was as on-edge about being in public, the patient identified that she started to have anxieties about other things. This extended out to more of a general anxiety picture, where she was scared of things such as driving. These even extended into having panic attacks. Much of these have resolved at present and were not prominent even entering into this year.     Along with her anxiety, the patient noted that her anxieties had the tendency to build up over the course of time, especially if she had deadlines looming. She noted that she was notoriously procrastinating on projects, doing them at the last minute if she could. She identified that she would sometimes lose sleep trying to get things done. The patient noted that her focus would sometimes be so bad in class that she wouldn't have learned the material during class time, causing her to have to study exceedingly hard outside there in order to learn and process the material.  In early 2022, the patient was evaluated and found to have ADHD. The patient notes that she believes her anxiety and some depressive symptoms stemmed from this. On depression screening, the patient identified that she's had difficulties with a depressive headspace for a long time. Some of this was her own perception of her inabilities, feeling that she was incapable of getting things done in a concise manner. She noted feeling depressed for much of her life, though this got worse over the past 2y. The patient stated that it coincided with her and her  moving to [place] (away from [facility]) and starting business as mushroom farmers. While the business is going well, the patient noted that she doesn't have much down time, causing her to constantly feel stressed. She currently endorses some difficulties with depressive symptoms including sleep changes, decreased appetite, difficulties with energy/motivation, and some loss of interest in activities. She noted that it has been worse in the past, particularly last year where she did have a suicide attempt. She denied current SI/HI. The patient screened negative for mare, psychosis, and traumatic symptoms at this time. ROS:   Review of Systems   Constitutional:  Positive for appetite change and unexpected weight change. HENT: Negative. Eyes: Negative. Respiratory: Negative. Cardiovascular: Negative. Gastrointestinal: Negative. Endocrine: Negative. Genitourinary: Negative. Musculoskeletal: Negative. Skin: Negative. Allergic/Immunologic: Negative. Neurological: Negative. Hematological: Negative. Psychiatric/Behavioral:  Positive for dysphoric mood. Past Psychiatric History:     Hosp: Denied  Diagnoses: Depression, ADHD (combined), Anxiety  Currrent medications: Adderall XR 25 mg daily, escitalopram 10 mg daily  Med trials: Bupropion, citalopram, sertraline  Outpt: Previously seen by counselors but not currently  NSSI: Denied  Suicide Attempts: One attempt in summer 2021 when she attempted to overdose on pills.  found her and helped her to vomit up the pills afterwards, did reach out and make sure that she did counseling after this but was never hospitalized for it. Past Medical History:   Diagnosis Date    Anxiety     Depression     Environmental and seasonal allergies 2017    Headache     Irregular menses 08/26/2020    Sprain of anterior talofibular ligament of right ankle 2020     Past Surgical History:   Procedure Laterality Date    WISDOM TOOTH EXTRACTION       Social History     Socioeconomic History    Marital status:      Spouse name: Vero Postal    Number of children: 0    Years of education: 16    Highest education level: Bachelor's degree (e.g., BA, AB, BS)   Occupational History    Occupation: farmer    Occupation: bachelors degree in sociology   Tobacco Use    Smoking status: Every Day     Types: Cigarettes    Smokeless tobacco: Never    Tobacco comments:     smoked 1 year, 2-3 cig/day 2021-22   Vaping Use    Vaping Use: Never used   Substance and Sexual Activity    Alcohol use:  Yes     Alcohol/week: 6.0 standard drinks     Types: 3 Glasses of wine, 3 Cans of beer per week     Comment: socially    Drug use: Yes     Comment: Infrequent cannabis usage    Sexual activity: Yes     Partners: Male   Social History Narrative    The patient moved from [place], [place] in April 2020 because her  was originally from the [facility]. She had family in [place] that were close by, now a bit more isolated from them. The patient and her  have been  for about 5 years at this time. They have no children despite trying to conceive since they got , identifying that her  may have some infertility issues that due cause some marital stress. 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 She identified that business is booming and they are constantly working 365 days a year without a lot of down time. The patient identified that she was an \"ok\" student in [employer] and college, generally getting Bs/Cs with the occasional A if she was really interested in the class. A firearm is present in the home but is kept in a locked cabinet. No  service for the patient, no legal issues at this time. Social Determinants of Health     Financial Resource Strain: Low Risk     Difficulty of Paying Living Expenses: Not hard at all   Food Insecurity: No Food Insecurity    Worried About Running Out of Food in the Last Year: Never true    Ran Out of Food in the Last Year: Never true      Family History   Problem Relation Age of Onset    No Known Problems Mother     Kidney stones Father     Other Father         polymyalgia rheumatica, diverticulitis    ADHD Sister     No Known Problems Brother      No Known Allergies  Current Outpatient Medications on File Prior to Visit   Medication Sig Dispense Refill    amphetamine-dextroamphetamine (ADDERALL XR) 25 MG extended release capsule Take 1 capsule by mouth every morning for 30 days. 30 capsule 0    escitalopram (LEXAPRO) 10 MG tablet Take 0.5 tablets by mouth daily for 7 days, THEN 1 tablet daily for 23 days. 30 tablet 0     No current facility-administered medications on file prior to visit.        OBJECTIVE:  Vitals:    08/18/22 0840   BP: 100/72   Pulse: 85   SpO2: 95%   Weight: 124 lb 6.4 oz (56.4 kg)       MSE: Appearance:    Appropriately dressed  Motor: No abnormal movements, tics or mannerisms. Eye Contact: Fair  Speech:    Appropriate rate and rhythm, mildly soft tone  Language:   Appropriate diction  Mood/Affect:   \"A bit down but it feels like it's getting better\" / Full range of affect able to be seen but some blunting noticeable throughout  Thought Process:    Linear, logical  Thought Content:    Depressive content present , No SI/HI  Hallucinations:   Denied, not seen to be responding to IS  Associations:   Intact  Attention/Concentration:   Intact to conversation  Orientation:    Alert and oriented x4  Memory:   Intact  Fund of Knowledge:    Appropriate for age and education  Insight/Judgement:   Intact / Intact    DIONI-7 SCREENING 8/18/2022 4/28/2022   Feeling nervous, anxious, or on edge Several days Several days   Not being able to stop or control worrying Not at all Several days   Worrying too much about different things Not at all Not at all   Trouble relaxing Several days Not at all   Being so restless that it is hard to sit still Not at all Not at all   Becoming easily annoyed or irritable Several days More than half the days   Feeling afraid as if something awful might happen Not at all Not at all   DIONI-7 Total Score 3 4   How difficult have these problems made it for you to do your work, take care of things at home, or get along with other people?  Somewhat difficult Somewhat difficult   Feeling nervous, anxious, or on edge - -   Not able to stop or control worrying - -   Worrying too much about different things - -   Trouble relaxing - -   Being so restless that it's hard to sit still - -   Becoming easily annoyed or irritable - -   Feeling afraid as if something awful might happen - -   DIONI-7 Total Score - -     PHQ-9 Questionaire 8/18/2022 7/26/2022   Little interest or pleasure in doing things 1 3   Feeling down, depressed, or hopeless 1 3   Trouble falling or staying asleep, or sleeping too much 3 3   Feeling tired or having little energy 1 3   Poor appetite or overeating 1 0   Feeling bad about yourself - or that you are a failure or have let yourself or your family down 2 2   Trouble concentrating on things, such as reading the newspaper or watching television 1 1   Moving or speaking so slowly that other people could have noticed.  Or the opposite - being so fidgety or restless that you have been moving around a lot more than usual 0 0   Thoughts that you would be better off dead, or of hurting yourself in some way 0 1   PHQ-9 Total Score 10 16   If you checked off any problems, how difficult have these problems made it for you to do your work, take care of things at home, or get along with other people? - 3      Labs:     Lab Results   Component Value Date    CHOL 196 02/24/2021     Lab Results   Component Value Date    TRIG 101 02/24/2021     Lab Results   Component Value Date    HDL 58 02/24/2021     Lab Results   Component Value Date    LDLCALC 118 (H) 02/24/2021     Lab Results   Component Value Date    LABVLDL 20 02/24/2021     Lab Results   Component Value Date    LABA1C 4.3 02/24/2021     Lab Results   Component Value Date    EAG 76.7 02/24/2021     Lab Results   Component Value Date    TSHFT4 0.78 02/24/2021    TSHREFLEX see below 02/24/2021        Last Drug screen: None on file    Imaging: No pertinent imaging    EKG: None on file        Odin Parrish MD   Psychiatry

## 2022-08-18 ENCOUNTER — OFFICE VISIT (OUTPATIENT)
Dept: PSYCHIATRY | Age: 27
End: 2022-08-18
Payer: COMMERCIAL

## 2022-08-18 VITALS
OXYGEN SATURATION: 95 % | BODY MASS INDEX: 22.75 KG/M2 | HEART RATE: 85 BPM | SYSTOLIC BLOOD PRESSURE: 100 MMHG | WEIGHT: 124.4 LBS | DIASTOLIC BLOOD PRESSURE: 72 MMHG

## 2022-08-18 DIAGNOSIS — F90.2 ATTENTION DEFICIT HYPERACTIVITY DISORDER (ADHD), COMBINED TYPE: Primary | ICD-10-CM

## 2022-08-18 DIAGNOSIS — F40.10 SOCIAL ANXIETY DISORDER: ICD-10-CM

## 2022-08-18 DIAGNOSIS — F33.1 MAJOR DEPRESSIVE DISORDER, RECURRENT, MODERATE (HCC): ICD-10-CM

## 2022-08-18 PROCEDURE — 99204 OFFICE O/P NEW MOD 45 MIN: CPT | Performed by: STUDENT IN AN ORGANIZED HEALTH CARE EDUCATION/TRAINING PROGRAM

## 2022-08-18 ASSESSMENT — PATIENT HEALTH QUESTIONNAIRE - PHQ9
8. MOVING OR SPEAKING SO SLOWLY THAT OTHER PEOPLE COULD HAVE NOTICED. OR THE OPPOSITE, BEING SO FIGETY OR RESTLESS THAT YOU HAVE BEEN MOVING AROUND A LOT MORE THAN USUAL: 0
5. POOR APPETITE OR OVEREATING: 1
7. TROUBLE CONCENTRATING ON THINGS, SUCH AS READING THE NEWSPAPER OR WATCHING TELEVISION: 1
SUM OF ALL RESPONSES TO PHQ QUESTIONS 1-9: 10
1. LITTLE INTEREST OR PLEASURE IN DOING THINGS: 1
3. TROUBLE FALLING OR STAYING ASLEEP: 3
6. FEELING BAD ABOUT YOURSELF - OR THAT YOU ARE A FAILURE OR HAVE LET YOURSELF OR YOUR FAMILY DOWN: 2
SUM OF ALL RESPONSES TO PHQ QUESTIONS 1-9: 10
2. FEELING DOWN, DEPRESSED OR HOPELESS: 1
SUM OF ALL RESPONSES TO PHQ9 QUESTIONS 1 & 2: 2
9. THOUGHTS THAT YOU WOULD BE BETTER OFF DEAD, OR OF HURTING YOURSELF: 0
SUM OF ALL RESPONSES TO PHQ QUESTIONS 1-9: 10
SUM OF ALL RESPONSES TO PHQ QUESTIONS 1-9: 10
4. FEELING TIRED OR HAVING LITTLE ENERGY: 1

## 2022-08-18 ASSESSMENT — ENCOUNTER SYMPTOMS
EYES NEGATIVE: 1
GASTROINTESTINAL NEGATIVE: 1
RESPIRATORY NEGATIVE: 1
ALLERGIC/IMMUNOLOGIC NEGATIVE: 1

## 2022-08-18 ASSESSMENT — ANXIETY QUESTIONNAIRES
3. WORRYING TOO MUCH ABOUT DIFFERENT THINGS: 0
1. FEELING NERVOUS, ANXIOUS, OR ON EDGE: 1
GAD7 TOTAL SCORE: 3
7. FEELING AFRAID AS IF SOMETHING AWFUL MIGHT HAPPEN: 0
6. BECOMING EASILY ANNOYED OR IRRITABLE: 1
2. NOT BEING ABLE TO STOP OR CONTROL WORRYING: 0
4. TROUBLE RELAXING: 1
IF YOU CHECKED OFF ANY PROBLEMS ON THIS QUESTIONNAIRE, HOW DIFFICULT HAVE THESE PROBLEMS MADE IT FOR YOU TO DO YOUR WORK, TAKE CARE OF THINGS AT HOME, OR GET ALONG WITH OTHER PEOPLE: SOMEWHAT DIFFICULT
5. BEING SO RESTLESS THAT IT IS HARD TO SIT STILL: 0

## 2022-08-26 RX ORDER — ESCITALOPRAM OXALATE 10 MG/1
10 TABLET ORAL DAILY
Qty: 30 TABLET | Refills: 5 | Status: SHIPPED | OUTPATIENT
Start: 2022-08-26 | End: 2022-09-19

## 2022-09-08 DIAGNOSIS — F90.2 ATTENTION DEFICIT HYPERACTIVITY DISORDER (ADHD), COMBINED TYPE: ICD-10-CM

## 2022-09-08 RX ORDER — DEXTROAMPHETAMINE SACCHARATE, AMPHETAMINE ASPARTATE MONOHYDRATE, DEXTROAMPHETAMINE SULFATE AND AMPHETAMINE SULFATE 6.25; 6.25; 6.25; 6.25 MG/1; MG/1; MG/1; MG/1
25 CAPSULE, EXTENDED RELEASE ORAL EVERY MORNING
Qty: 30 CAPSULE | Refills: 0 | Status: SHIPPED | OUTPATIENT
Start: 2022-09-08 | End: 2022-10-12 | Stop reason: SDUPTHER

## 2022-09-19 ENCOUNTER — OFFICE VISIT (OUTPATIENT)
Dept: PSYCHIATRY | Age: 27
End: 2022-09-19
Payer: COMMERCIAL

## 2022-09-19 VITALS
DIASTOLIC BLOOD PRESSURE: 62 MMHG | OXYGEN SATURATION: 98 % | WEIGHT: 121.2 LBS | HEART RATE: 101 BPM | BODY MASS INDEX: 22.17 KG/M2 | SYSTOLIC BLOOD PRESSURE: 98 MMHG

## 2022-09-19 DIAGNOSIS — F33.1 MAJOR DEPRESSIVE DISORDER, RECURRENT, MODERATE (HCC): Primary | ICD-10-CM

## 2022-09-19 DIAGNOSIS — F51.05 INSOMNIA DUE TO MENTAL CONDITION: ICD-10-CM

## 2022-09-19 PROCEDURE — 99214 OFFICE O/P EST MOD 30 MIN: CPT | Performed by: STUDENT IN AN ORGANIZED HEALTH CARE EDUCATION/TRAINING PROGRAM

## 2022-09-19 RX ORDER — TRAZODONE HYDROCHLORIDE 50 MG/1
50 TABLET ORAL NIGHTLY
Qty: 14 TABLET | Refills: 0 | Status: SHIPPED | OUTPATIENT
Start: 2022-09-19 | End: 2022-10-03

## 2022-09-19 RX ORDER — ESCITALOPRAM OXALATE 10 MG/1
15 TABLET ORAL DAILY
Qty: 45 TABLET | Refills: 2 | Status: SHIPPED | OUTPATIENT
Start: 2022-09-19

## 2022-09-19 ASSESSMENT — ANXIETY QUESTIONNAIRES
2. NOT BEING ABLE TO STOP OR CONTROL WORRYING: 0
7. FEELING AFRAID AS IF SOMETHING AWFUL MIGHT HAPPEN: 0
1. FEELING NERVOUS, ANXIOUS, OR ON EDGE: 1
4. TROUBLE RELAXING: 1
5. BEING SO RESTLESS THAT IT IS HARD TO SIT STILL: 0
IF YOU CHECKED OFF ANY PROBLEMS ON THIS QUESTIONNAIRE, HOW DIFFICULT HAVE THESE PROBLEMS MADE IT FOR YOU TO DO YOUR WORK, TAKE CARE OF THINGS AT HOME, OR GET ALONG WITH OTHER PEOPLE: NOT DIFFICULT AT ALL
GAD7 TOTAL SCORE: 3
3. WORRYING TOO MUCH ABOUT DIFFERENT THINGS: 0
6. BECOMING EASILY ANNOYED OR IRRITABLE: 1

## 2022-09-19 ASSESSMENT — PATIENT HEALTH QUESTIONNAIRE - PHQ9
SUM OF ALL RESPONSES TO PHQ9 QUESTIONS 1 & 2: 2
2. FEELING DOWN, DEPRESSED OR HOPELESS: 1
SUM OF ALL RESPONSES TO PHQ QUESTIONS 1-9: 9
SUM OF ALL RESPONSES TO PHQ QUESTIONS 1-9: 9
8. MOVING OR SPEAKING SO SLOWLY THAT OTHER PEOPLE COULD HAVE NOTICED. OR THE OPPOSITE, BEING SO FIGETY OR RESTLESS THAT YOU HAVE BEEN MOVING AROUND A LOT MORE THAN USUAL: 0
9. THOUGHTS THAT YOU WOULD BE BETTER OFF DEAD, OR OF HURTING YOURSELF: 0
10. IF YOU CHECKED OFF ANY PROBLEMS, HOW DIFFICULT HAVE THESE PROBLEMS MADE IT FOR YOU TO DO YOUR WORK, TAKE CARE OF THINGS AT HOME, OR GET ALONG WITH OTHER PEOPLE: 1
3. TROUBLE FALLING OR STAYING ASLEEP: 3
6. FEELING BAD ABOUT YOURSELF - OR THAT YOU ARE A FAILURE OR HAVE LET YOURSELF OR YOUR FAMILY DOWN: 1
1. LITTLE INTEREST OR PLEASURE IN DOING THINGS: 1
SUM OF ALL RESPONSES TO PHQ QUESTIONS 1-9: 9
7. TROUBLE CONCENTRATING ON THINGS, SUCH AS READING THE NEWSPAPER OR WATCHING TELEVISION: 1
4. FEELING TIRED OR HAVING LITTLE ENERGY: 2
5. POOR APPETITE OR OVEREATING: 0
SUM OF ALL RESPONSES TO PHQ QUESTIONS 1-9: 9

## 2022-09-19 NOTE — PROGRESS NOTES
was not able to maintain a deep sleep. Patient did identify that she tends to get deeper sleep when she is sleeping in the early morning hours. She did identify that she has to have the TV on in order to facilitate sleep. Patient noted that she has some difficulties at nighttime with ruminations over things that she has not been able to get done. Patient denied any SI/HI/AVH on evaluation today. ROS:  Review of Systems   Constitutional:  Positive for fatigue. HENT: Negative. Eyes: Negative. Respiratory: Negative. Cardiovascular: Negative. Gastrointestinal: Negative. Endocrine: Negative. Genitourinary: Negative. Musculoskeletal: Negative. Skin: Negative. Allergic/Immunologic: Negative. Neurological: Negative. Hematological: Negative. Psychiatric/Behavioral:  Positive for dysphoric mood and sleep disturbance. The patient is nervous/anxious. Brief Medical Hx:   Patient Active Problem List   Diagnosis    Seasonal allergies    Otitis externa of right ear    Attention deficit hyperactivity disorder (ADHD), combined type    Major depressive disorder, recurrent, moderate (HCC)    Social anxiety disorder        Brief Psych Hx:  Hosp: Denied  Diagnoses: Depression, ADHD (combined), Anxiety  Med trials: Bupropion, citalopram, sertraline  Outpt: Previously seen by counselors but not currently  NSSI: Denied  Suicide Attempts: One attempt in summer 2021 when she attempted to overdose on pills.  found her and helped her to vomit up the pills afterwards, did reach out and make sure that she did counseling after this but was never hospitalized for it.        O:  Wt Readings from Last 3 Encounters:   09/19/22 121 lb 3.2 oz (55 kg)   08/18/22 124 lb 6.4 oz (56.4 kg)   07/26/22 130 lb (59 kg)       Vitals:    09/19/22 1615   BP: 98/62   Pulse: (!) 101   SpO2: 98%   Weight: 121 lb 3.2 oz (55 kg)       Mental Status Exam:   Appearance:    Appropriately dressed  Motor: No abnormal movements, tics or mannerisms. Eye Contact: Good  Speech:    Appropriate rate and rhythm  Language:   Appropriate diction  Mood/Affect:  \" It's okay\"/full range of affect able to be seen  Thought Process:   Linear, logical  Thought Content:    Some anxious content present, no SI/HI  Hallucinations:   Denied, not seem to be responding to internal stimuli  Associations:   Intact  Attention/Concentration:   Intact  Orientation:    Alert and oriented x4  Memory:   Intact  Fund of Knowledge:    Appropriate for age and education  Insight/Judgement:   Intact/intact      DIONI-7 SCREENING 9/19/2022 8/18/2022   Feeling nervous, anxious, or on edge Several days Several days   Not being able to stop or control worrying Not at all Not at all   Worrying too much about different things Not at all Not at all   Trouble relaxing Several days Several days   Being so restless that it is hard to sit still Not at all Not at all   Becoming easily annoyed or irritable Several days Several days   Feeling afraid as if something awful might happen Not at all Not at all   DIONI-7 Total Score 3 3   How difficult have these problems made it for you to do your work, take care of things at home, or get along with other people?  Not difficult at all Somewhat difficult   Feeling nervous, anxious, or on edge - -   Not able to stop or control worrying - -   Worrying too much about different things - -   Trouble relaxing - -   Being so restless that it's hard to sit still - -   Becoming easily annoyed or irritable - -   Feeling afraid as if something awful might happen - -   DIONI-7 Total Score - -     PHQ-9 Questionaire 9/19/2022 8/18/2022   Little interest or pleasure in doing things 1 1   Feeling down, depressed, or hopeless 1 1   Trouble falling or staying asleep, or sleeping too much 3 3   Feeling tired or having little energy 2 1   Poor appetite or overeating 0 1   Feeling bad about yourself - or that you are a failure or have let yourself or your family down 1 2   Trouble concentrating on things, such as reading the newspaper or watching television 1 1   Moving or speaking so slowly that other people could have noticed. Or the opposite - being so fidgety or restless that you have been moving around a lot more than usual 0 0   Thoughts that you would be better off dead, or of hurting yourself in some way 0 0   PHQ-9 Total Score 9 10   If you checked off any problems, how difficult have these problems made it for you to do your work, take care of things at home, or get along with other people? 1 -        Labs:     Hospital Outpatient Visit on 02/24/2021   Component Date Value Ref Range Status    Vit D, 25-Hydroxy 02/24/2021 33.1  >=30 ng/mL Final    Comment: <=20 ng/mL. ........... Sallyanne Chain Deficient  21-29 ng/mL. ......... Sallyanne Chain Insufficient  >=30 ng/mL. ........ Sallyanne Chain Sufficient      Cholesterol, Total 02/24/2021 196  0 - 199 mg/dL Final    Triglycerides 02/24/2021 101  0 - 150 mg/dL Final    HDL 02/24/2021 58  40 - 60 mg/dL Final    LDL Calculated 02/24/2021 118 (A)  <100 mg/dL Final    VLDL Cholesterol Calculated 02/24/2021 20  Not Established mg/dL Final    Sodium 02/24/2021 135 (A)  136 - 145 mmol/L Final    Potassium 02/24/2021 3.9  3.5 - 5.1 mmol/L Final    Chloride 02/24/2021 101  99 - 110 mmol/L Final    CO2 02/24/2021 25  21 - 32 mmol/L Final    Anion Gap 02/24/2021 9  3 - 16 Final    Glucose 02/24/2021 62 (A)  70 - 99 mg/dL Final    BUN 02/24/2021 10  7 - 20 mg/dL Final    Creatinine 02/24/2021 0.6  0.6 - 1.1 mg/dL Final    GFR Non- 02/24/2021 >60  >60 Final    Comment: >60 mL/min/1.73m2 EGFR, calc. for ages 25 and older using the  MDRD formula (not corrected for weight), is valid for stable  renal function. GFR  02/24/2021 >60  >60 Final    Comment: Chronic Kidney Disease: less than 60 ml/min/1.73 sq.m. Kidney Failure: less than 15 ml/min/1.73 sq.m. Results valid for patients 18 years and older.       Calcium 02/24/2021 9.5  8.3 - 10.6 mg/dL Final    Total Protein 02/24/2021 7.3  6.4 - 8.2 g/dL Final    Albumin 02/24/2021 4.4  3.4 - 5.0 g/dL Final    Albumin/Globulin Ratio 02/24/2021 1.5  1.1 - 2.2 Final    Total Bilirubin 02/24/2021 1.0  0.0 - 1.0 mg/dL Final    Alkaline Phosphatase 02/24/2021 63  40 - 129 U/L Final    ALT 02/24/2021 13  10 - 40 U/L Final    AST 02/24/2021 19  15 - 37 U/L Final    Globulin 02/24/2021 2.9  g/dL Final    TSH Reflex FT4 02/24/2021 0.78  0.27 - 4.20 uIU/mL Final    Hemoglobin A1C 02/24/2021 4.3  See comment % Final    Comment: Comment:  Diagnosis of Diabetes: > or = 6.5%  Increased risk of diabetes (Prediabetes): 5.7-6.4%  Glycemic Control: Nonpregnant Adults: <7.0%                    Pregnant: <6.0%        eAG 02/24/2021 76.7  mg/dL Final    WBC 02/24/2021 5.8  4.0 - 11.0 K/uL Final    RBC 02/24/2021 4.21  4.00 - 5.20 M/uL Final    Hemoglobin 02/24/2021 13.1  12.0 - 16.0 g/dL Final    Hematocrit 02/24/2021 37.6  36.0 - 48.0 % Final    MCV 02/24/2021 89.1  80.0 - 100.0 fL Final    MCH 02/24/2021 31.0  26.0 - 34.0 pg Final    MCHC 02/24/2021 34.8  31.0 - 36.0 g/dL Final    RDW 02/24/2021 12.4  12.4 - 15.4 % Final    Platelets 69/31/8555 285  135 - 450 K/uL Final    MPV 02/24/2021 7.8  5.0 - 10.5 fL Final    Neutrophils % 02/24/2021 62.3  % Final    Lymphocytes % 02/24/2021 28.0  % Final    Monocytes % 02/24/2021 6.3  % Final    Eosinophils % 02/24/2021 2.6  % Final    Basophils % 02/24/2021 0.8  % Final    Neutrophils Absolute 02/24/2021 3.6  1.7 - 7.7 K/uL Final    Lymphocytes Absolute 02/24/2021 1.6  1.0 - 5.1 K/uL Final    Monocytes Absolute 02/24/2021 0.4  0.0 - 1.3 K/uL Final    Eosinophils Absolute 02/24/2021 0.2  0.0 - 0.6 K/uL Final    Basophils Absolute 02/24/2021 0.0  0.0 - 0.2 K/uL Final    TSH 02/24/2021 see below  0.27 - 4.20 uIU/mL Final    Duplicate order       EKG: None on file        Majo Hernandez MD  Psychiatrist

## 2022-10-01 DIAGNOSIS — F51.05 INSOMNIA DUE TO MENTAL CONDITION: ICD-10-CM

## 2022-10-03 RX ORDER — TRAZODONE HYDROCHLORIDE 50 MG/1
50 TABLET ORAL NIGHTLY
Qty: 30 TABLET | Refills: 1 | Status: SHIPPED | OUTPATIENT
Start: 2022-10-03 | End: 2022-11-18

## 2022-10-03 NOTE — TELEPHONE ENCOUNTER
Medication:   Requested Prescriptions     Pending Prescriptions Disp Refills    traZODone (DESYREL) 50 MG tablet [Pharmacy Med Name: TRAZODONE 50MG TABLETS] 14 tablet 0     Sig: TAKE 1 TABLET BY MOUTH EVERY NIGHT FOR 14 DAYS        Last Filled: 9/19/22     Patient Phone Number: [phone] (home)     Last appt: 9/19/2022   Next appt: 10/20/2022    Last OARRS: No flowsheet data found.

## 2022-10-12 DIAGNOSIS — F90.2 ATTENTION DEFICIT HYPERACTIVITY DISORDER (ADHD), COMBINED TYPE: ICD-10-CM

## 2022-10-12 RX ORDER — DEXTROAMPHETAMINE SACCHARATE, AMPHETAMINE ASPARTATE MONOHYDRATE, DEXTROAMPHETAMINE SULFATE AND AMPHETAMINE SULFATE 6.25; 6.25; 6.25; 6.25 MG/1; MG/1; MG/1; MG/1
25 CAPSULE, EXTENDED RELEASE ORAL EVERY MORNING
Qty: 30 CAPSULE | Refills: 0 | Status: SHIPPED | OUTPATIENT
Start: 2022-10-12 | End: 2022-11-11

## 2022-10-15 ASSESSMENT — ENCOUNTER SYMPTOMS
ALLERGIC/IMMUNOLOGIC NEGATIVE: 1
RESPIRATORY NEGATIVE: 1
GASTROINTESTINAL NEGATIVE: 1
EYES NEGATIVE: 1

## 2022-11-09 DIAGNOSIS — F90.2 ATTENTION DEFICIT HYPERACTIVITY DISORDER (ADHD), COMBINED TYPE: ICD-10-CM

## 2022-11-09 RX ORDER — DEXTROAMPHETAMINE SACCHARATE, AMPHETAMINE ASPARTATE MONOHYDRATE, DEXTROAMPHETAMINE SULFATE AND AMPHETAMINE SULFATE 6.25; 6.25; 6.25; 6.25 MG/1; MG/1; MG/1; MG/1
25 CAPSULE, EXTENDED RELEASE ORAL EVERY MORNING
Qty: 30 CAPSULE | Refills: 0 | Status: SHIPPED | OUTPATIENT
Start: 2022-11-09 | End: 2022-12-09

## 2022-11-17 PROBLEM — F17.200 CURRENT SMOKER: Status: ACTIVE | Noted: 2022-11-17

## 2022-11-18 ENCOUNTER — OFFICE VISIT (OUTPATIENT)
Dept: INTERNAL MEDICINE CLINIC | Age: 27
End: 2022-11-18
Payer: COMMERCIAL

## 2022-11-18 VITALS
SYSTOLIC BLOOD PRESSURE: 118 MMHG | HEART RATE: 91 BPM | BODY MASS INDEX: 22.02 KG/M2 | TEMPERATURE: 97.4 F | DIASTOLIC BLOOD PRESSURE: 64 MMHG | OXYGEN SATURATION: 99 % | WEIGHT: 120.4 LBS

## 2022-11-18 DIAGNOSIS — F33.1 MAJOR DEPRESSIVE DISORDER, RECURRENT, MODERATE (HCC): Primary | ICD-10-CM

## 2022-11-18 DIAGNOSIS — F90.2 ATTENTION DEFICIT HYPERACTIVITY DISORDER (ADHD), COMBINED TYPE: ICD-10-CM

## 2022-11-18 DIAGNOSIS — F17.200 CURRENT SMOKER: ICD-10-CM

## 2022-11-18 PROCEDURE — 99214 OFFICE O/P EST MOD 30 MIN: CPT | Performed by: INTERNAL MEDICINE

## 2022-11-18 RX ORDER — ESCITALOPRAM OXALATE 10 MG/1
20 TABLET ORAL DAILY
Qty: 60 TABLET | Refills: 2 | Status: SHIPPED | OUTPATIENT
Start: 2022-11-18

## 2022-11-18 NOTE — PROGRESS NOTES
Bhavana Garcia   :  2023    ASSESSMENT/PLAN:   1. Major depressive disorder, recurrent, moderate (HCC)  Assessment & Plan:  Acutely more symptomatic in the past week. Possibly triggered by time change, decrease light hours. Increase Lexapro to 20 mg. Consider adding low-dose Wellbutrin, but previously did not tolerate that and Adderall together well. Discussed breathing techniques to help with the middle of the night awakenings. Consider trying the trazodone again as a reset for sleep if medication does not work. Orders:  -     escitalopram (LEXAPRO) 10 MG tablet; Take 2 tablets by mouth daily, Disp-60 tablet, R-2Normal  2. Attention deficit hyperactivity disorder (ADHD), combined type  Assessment & Plan:  Previously working well but with worsened depression, having more difficulty with ADHD symptoms. 3. Current smoker  Comments:  Uses as motivator, 3-4 x/day. Encourage cessation. Return in about 1 month (around 2022) for depression. SUBJECTIVE     28 y.o. female established patient here for:   Chief Complaint   Patient presents with    Follow-up     Discuss taking over lexapro, Declined flu     Smoking about 3 to 4 cigarettes a day. Uses that as her motivator to get through the day. Unable to see Dr. Elizabeth Duron anymore. Found out her insurance was not paying for these visits. Afford to return. Was feeling good with med changes, until this week. Now feeling horrible. Can't make self get up. Teary. Really depressed but not having thought of self harm. Tolerates the lexapro at 15 mg. In past, has felt like stopped working. [place] really emotional with bupropion when also taking the adderal.     This week, waking up and then can't get back to sleep for a few hours. Will feel panic. Didn't like trazodone, felt like waking up in a fog. Was only taking 25 mg. Falling asleep to a book, using white noise, got TV out of room. That is going well.      Physically very active during the day. Looking in to hypnosis. Review of Systems    Outpatient Medications Marked as Taking for the 11/18/22 encounter (Office Visit) with Renny Whyte MD   Medication Sig Dispense Refill    escitalopram (LEXAPRO) 10 MG tablet Take 2 tablets by mouth daily 60 tablet 2    amphetamine-dextroamphetamine (ADDERALL XR) 25 MG extended release capsule Take 1 capsule by mouth every morning for 30 days. 30 capsule 0       OBJECTIVE:  Vitals:    11/18/22 0934   BP: 118/64   Pulse: 91   Temp: 97.4 °F (36.3 °C)   TempSrc: Temporal   SpO2: 99%   Weight: 120 lb 6.4 oz (54.6 kg)     Physical Exam  Constitutional:       Appearance: Normal appearance. Cardiovascular:      Rate and Rhythm: Regular rhythm. Heart sounds: Normal heart sounds. Pulmonary:      Breath sounds: Normal breath sounds. Psychiatric:      Comments: Appropriate to discussion     This note was generated completely or in part utilizing Dragon dictation speech recognition software. Occasionally, words are mistranscribed and despite editing, the text may contain inaccuracies due to incorrect word recognition.   If further clarification is needed please contact the office at [phone]  --Renny Whyte MD

## 2022-11-18 NOTE — ASSESSMENT & PLAN NOTE
Acutely more symptomatic in the past week. Possibly triggered by time change, decrease light hours. Increase Lexapro to 20 mg. Consider adding low-dose Wellbutrin, but previously did not tolerate that and Adderall together well. Discussed breathing techniques to help with the middle of the night awakenings. Consider trying the trazodone again as a reset for sleep if medication does not work.

## 2022-12-12 DIAGNOSIS — F90.2 ATTENTION DEFICIT HYPERACTIVITY DISORDER (ADHD), COMBINED TYPE: ICD-10-CM

## 2022-12-12 RX ORDER — DEXTROAMPHETAMINE SACCHARATE, AMPHETAMINE ASPARTATE MONOHYDRATE, DEXTROAMPHETAMINE SULFATE AND AMPHETAMINE SULFATE 6.25; 6.25; 6.25; 6.25 MG/1; MG/1; MG/1; MG/1
25 CAPSULE, EXTENDED RELEASE ORAL EVERY MORNING
Qty: 30 CAPSULE | Refills: 0 | Status: SHIPPED | OUTPATIENT
Start: 2022-12-12 | End: 2023-01-11

## 2022-12-20 ENCOUNTER — TELEMEDICINE (OUTPATIENT)
Dept: INTERNAL MEDICINE CLINIC | Age: 27
End: 2022-12-20
Payer: COMMERCIAL

## 2022-12-20 DIAGNOSIS — F33.1 MAJOR DEPRESSIVE DISORDER, RECURRENT, MODERATE (HCC): Primary | ICD-10-CM

## 2022-12-20 PROCEDURE — 99214 OFFICE O/P EST MOD 30 MIN: CPT | Performed by: INTERNAL MEDICINE

## 2022-12-20 RX ORDER — BUPROPION HYDROCHLORIDE 150 MG/1
150 TABLET ORAL EVERY MORNING
Qty: 30 TABLET | Refills: 1 | Status: SHIPPED | OUTPATIENT
Start: 2022-12-20

## 2022-12-20 ASSESSMENT — PATIENT HEALTH QUESTIONNAIRE - PHQ9
SUM OF ALL RESPONSES TO PHQ QUESTIONS 1-9: 12
2. FEELING DOWN, DEPRESSED OR HOPELESS: MORE THAN HALF THE DAYS
8. MOVING OR SPEAKING SO SLOWLY THAT OTHER PEOPLE COULD HAVE NOTICED. OR THE OPPOSITE, BEING SO FIGETY OR RESTLESS THAT YOU HAVE BEEN MOVING AROUND A LOT MORE THAN USUAL: 0
SUM OF ALL RESPONSES TO PHQ9 QUESTIONS 1 & 2: 3
2. FEELING DOWN, DEPRESSED OR HOPELESS: 2
6. FEELING BAD ABOUT YOURSELF - OR THAT YOU ARE A FAILURE OR HAVE LET YOURSELF OR YOUR FAMILY DOWN: SEVERAL DAYS
SUM OF ALL RESPONSES TO PHQ QUESTIONS 1-9: 12
1. LITTLE INTEREST OR PLEASURE IN DOING THINGS: 1
5. POOR APPETITE OR OVEREATING: 1
SUM OF ALL RESPONSES TO PHQ9 QUESTIONS 1 & 2: 3
SUM OF ALL RESPONSES TO PHQ QUESTIONS 1-9: 12
1. LITTLE INTEREST OR PLEASURE IN DOING THINGS: SEVERAL DAYS
9. THOUGHTS THAT YOU WOULD BE BETTER OFF DEAD, OR OF HURTING YOURSELF: NOT AT ALL
8. MOVING OR SPEAKING SO SLOWLY THAT OTHER PEOPLE COULD HAVE NOTICED. OR THE OPPOSITE - BEING SO FIDGETY OR RESTLESS THAT YOU HAVE BEEN MOVING AROUND A LOT MORE THAN USUAL: NOT AT ALL
3. TROUBLE FALLING OR STAYING ASLEEP: NEARLY EVERY DAY
4. FEELING TIRED OR HAVING LITTLE ENERGY: 2
3. TROUBLE FALLING OR STAYING ASLEEP: 3
9. THOUGHTS THAT YOU WOULD BE BETTER OFF DEAD, OR OF HURTING YOURSELF: 0
SUM OF ALL RESPONSES TO PHQ QUESTIONS 1-9: 12
4. FEELING TIRED OR HAVING LITTLE ENERGY: MORE THAN HALF THE DAYS
7. TROUBLE CONCENTRATING ON THINGS, SUCH AS READING THE NEWSPAPER OR WATCHING TELEVISION: 2
5. POOR APPETITE OR OVEREATING: SEVERAL DAYS
10. IF YOU CHECKED OFF ANY PROBLEMS, HOW DIFFICULT HAVE THESE PROBLEMS MADE IT FOR YOU TO DO YOUR WORK, TAKE CARE OF THINGS AT HOME, OR GET ALONG WITH OTHER PEOPLE: VERY DIFFICULT
7. TROUBLE CONCENTRATING ON THINGS, SUCH AS READING THE NEWSPAPER OR WATCHING TELEVISION: MORE THAN HALF THE DAYS
10. IF YOU CHECKED OFF ANY PROBLEMS, HOW DIFFICULT HAVE THESE PROBLEMS MADE IT FOR YOU TO DO YOUR WORK, TAKE CARE OF THINGS AT HOME, OR GET ALONG WITH OTHER PEOPLE: 2
6. FEELING BAD ABOUT YOURSELF - OR THAT YOU ARE A FAILURE OR HAVE LET YOURSELF OR YOUR FAMILY DOWN: 1
SUM OF ALL RESPONSES TO PHQ QUESTIONS 1-9: 12

## 2022-12-20 ASSESSMENT — ANXIETY QUESTIONNAIRES
3. WORRYING TOO MUCH ABOUT DIFFERENT THINGS: SEVERAL DAYS
1. FEELING NERVOUS, ANXIOUS, OR ON EDGE: 1
5. BEING SO RESTLESS THAT IT IS HARD TO SIT STILL: MORE THAN HALF THE DAYS
4. TROUBLE RELAXING: 2
1. FEELING NERVOUS, ANXIOUS, OR ON EDGE: SEVERAL DAYS
7. FEELING AFRAID AS IF SOMETHING AWFUL MIGHT HAPPEN: NOT AT ALL
6. BECOMING EASILY ANNOYED OR IRRITABLE: MORE THAN HALF THE DAYS
3. WORRYING TOO MUCH ABOUT DIFFERENT THINGS: 1
IF YOU CHECKED OFF ANY PROBLEMS ON THIS QUESTIONNAIRE, HOW DIFFICULT HAVE THESE PROBLEMS MADE IT FOR YOU TO DO YOUR WORK, TAKE CARE OF THINGS AT HOME, OR GET ALONG WITH OTHER PEOPLE: SOMEWHAT DIFFICULT
GAD7 TOTAL SCORE: 9
2. NOT BEING ABLE TO STOP OR CONTROL WORRYING: 1
4. TROUBLE RELAXING: MORE THAN HALF THE DAYS
5. BEING SO RESTLESS THAT IT IS HARD TO SIT STILL: 2
7. FEELING AFRAID AS IF SOMETHING AWFUL MIGHT HAPPEN: 0
IF YOU CHECKED OFF ANY PROBLEMS ON THIS QUESTIONNAIRE, HOW DIFFICULT HAVE THESE PROBLEMS MADE IT FOR YOU TO DO YOUR WORK, TAKE CARE OF THINGS AT HOME, OR GET ALONG WITH OTHER PEOPLE: SOMEWHAT DIFFICULT
6. BECOMING EASILY ANNOYED OR IRRITABLE: 2
2. NOT BEING ABLE TO STOP OR CONTROL WORRYING: SEVERAL DAYS

## 2022-12-20 NOTE — ASSESSMENT & PLAN NOTE
Not controlled. Improved but still remains symptomatic. PHQ9 =12. Continue lexapro 20 mg and add wellbutrin xl 150 mg daily. Ok to continue adderal xr 25 mg daily.

## 2022-12-20 NOTE — PROGRESS NOTES
TELEHEALTH EVALUATION -- Audio/Visual (During [place] [address] public [facility] emergency)  2022  Adalgisa Mccray (: 1995)      ASSESSMENT/PLAN:  Major depressive disorder, recurrent, moderate (HCC)  Assessment & Plan:  Not controlled. Improved but still remains symptomatic. PHQ9 =12. Continue lexapro 20 mg and add wellbutrin xl 150 mg daily. Ok to continue adderal xr 25 mg daily. Return in about 4 weeks (around 2023) for med check, depression. SUBJECTIVE/OBJECTIVE:  28 y.o. female here for evaluation of the following chief complaint(s):   Chief Complaint   Patient presents with    Follow-up     Slight improvement in depressive symptoms since increasing lexapro to 20 mg. Now has a window during day when she feels functional, typically between 10a-3p that feels pretty good. More personal stressors for her and  an having more trouble getting to sleep this week. Bed at 11p and then reads until l can fall asleep, around 2a. Sets alarm for 7:30 but often not up until 9:30 or 10a. Has realized feeling seasonal component to the depression. Every other week, seeing a counselor in person. Going well. Review of Systems    Patient-Reported Vitals 2022   Patient-Reported Weight 120   Patient-Reported Height 5'2        Physical Exam  Constitutional:       Appearance: Normal appearance. Psychiatric:         Behavior: Behavior normal.         Thought Content: Thought content normal.      Comments: Affect appropriate       PHQ Scores 2022   PHQ2 Score 3 2 2 6 1 4 5   PHQ9 Score 12 9 10 16 4 16 17     Interpretation of Total Score Depression Severity: 1-4 = Minimal depression, 5-9 = Mild depression, 10-14 = Moderate depression, 15-19 = Moderately severe depression, 20-27 = Severe depression    Odalys Mcgowan, was evaluated through a synchronous (real-time) audio-video encounter.  The patient (or guardian if applicable) is aware that this is a billable service, which includes applicable co-pays. This Virtual Visit was conducted with patient's (and/or legal guardian's) consent. The visit was conducted pursuant to the emergency declaration under the [facility], [address] [place] [address] authority and the [employer] and [employer] General Act. Patient identification was verified, and a caregiver was present when appropriate. The patient was located at Home: [address] [place] [address]. Provider was located at [facility] (Appt Dept): [address] [facility],  [address] [place] [address].        Sredehar David MD

## 2023-01-15 DIAGNOSIS — F90.2 ATTENTION DEFICIT HYPERACTIVITY DISORDER (ADHD), COMBINED TYPE: ICD-10-CM

## 2023-01-16 RX ORDER — DEXTROAMPHETAMINE SACCHARATE, AMPHETAMINE ASPARTATE MONOHYDRATE, DEXTROAMPHETAMINE SULFATE AND AMPHETAMINE SULFATE 6.25; 6.25; 6.25; 6.25 MG/1; MG/1; MG/1; MG/1
25 CAPSULE, EXTENDED RELEASE ORAL EVERY MORNING
Qty: 30 CAPSULE | Refills: 0 | Status: SHIPPED | OUTPATIENT
Start: 2023-01-16 | End: 2023-02-15

## 2023-01-20 ENCOUNTER — OFFICE VISIT (OUTPATIENT)
Dept: INTERNAL MEDICINE CLINIC | Age: 28
End: 2023-01-20
Payer: COMMERCIAL

## 2023-01-20 VITALS
TEMPERATURE: 97.6 F | SYSTOLIC BLOOD PRESSURE: 128 MMHG | HEART RATE: 87 BPM | BODY MASS INDEX: 21.69 KG/M2 | OXYGEN SATURATION: 98 % | DIASTOLIC BLOOD PRESSURE: 70 MMHG | WEIGHT: 118.6 LBS

## 2023-01-20 DIAGNOSIS — J06.9 VIRAL URI: Primary | ICD-10-CM

## 2023-01-20 DIAGNOSIS — F33.1 MAJOR DEPRESSIVE DISORDER, RECURRENT, MODERATE (HCC): ICD-10-CM

## 2023-01-20 PROBLEM — Z87.891 FORMER SMOKER: Status: ACTIVE | Noted: 2022-11-17

## 2023-01-20 PROCEDURE — 99213 OFFICE O/P EST LOW 20 MIN: CPT | Performed by: INTERNAL MEDICINE

## 2023-01-20 RX ORDER — ESCITALOPRAM OXALATE 10 MG/1
20 TABLET ORAL DAILY
Qty: 30 TABLET | Refills: 5 | Status: SHIPPED | OUTPATIENT
Start: 2023-01-20

## 2023-01-20 RX ORDER — BUPROPION HYDROCHLORIDE 150 MG/1
150 TABLET ORAL EVERY MORNING
Qty: 30 TABLET | Refills: 5 | Status: SHIPPED | OUTPATIENT
Start: 2023-01-20

## 2023-01-20 ASSESSMENT — PATIENT HEALTH QUESTIONNAIRE - PHQ9
4. FEELING TIRED OR HAVING LITTLE ENERGY: 1
SUM OF ALL RESPONSES TO PHQ QUESTIONS 1-9: 5
SUM OF ALL RESPONSES TO PHQ QUESTIONS 1-9: 5
7. TROUBLE CONCENTRATING ON THINGS, SUCH AS READING THE NEWSPAPER OR WATCHING TELEVISION: 0
3. TROUBLE FALLING OR STAYING ASLEEP: 1
SUM OF ALL RESPONSES TO PHQ9 QUESTIONS 1 & 2: 2
9. THOUGHTS THAT YOU WOULD BE BETTER OFF DEAD, OR OF HURTING YOURSELF: 0
6. FEELING BAD ABOUT YOURSELF - OR THAT YOU ARE A FAILURE OR HAVE LET YOURSELF OR YOUR FAMILY DOWN: 1
1. LITTLE INTEREST OR PLEASURE IN DOING THINGS: 1
8. MOVING OR SPEAKING SO SLOWLY THAT OTHER PEOPLE COULD HAVE NOTICED. OR THE OPPOSITE, BEING SO FIGETY OR RESTLESS THAT YOU HAVE BEEN MOVING AROUND A LOT MORE THAN USUAL: 0
SUM OF ALL RESPONSES TO PHQ QUESTIONS 1-9: 5
2. FEELING DOWN, DEPRESSED OR HOPELESS: 1
5. POOR APPETITE OR OVEREATING: 0
SUM OF ALL RESPONSES TO PHQ QUESTIONS 1-9: 5

## 2023-01-20 ASSESSMENT — ANXIETY QUESTIONNAIRES
3. WORRYING TOO MUCH ABOUT DIFFERENT THINGS: 1
4. TROUBLE RELAXING: 1
6. BECOMING EASILY ANNOYED OR IRRITABLE: 1
GAD7 TOTAL SCORE: 4
5. BEING SO RESTLESS THAT IT IS HARD TO SIT STILL: 1
1. FEELING NERVOUS, ANXIOUS, OR ON EDGE: 0
2. NOT BEING ABLE TO STOP OR CONTROL WORRYING: 0
7. FEELING AFRAID AS IF SOMETHING AWFUL MIGHT HAPPEN: 0

## 2023-01-20 NOTE — PROGRESS NOTES
Ayanna Lugo   :  1995    ASSESSMENT/PLAN:   1. Viral URI  Comments:  Reassured, discussed symptomatic treatment  2. Major depressive disorder, recurrent, moderate (HCC)  Assessment & Plan:  Currently doing well with Wellbutrin  and Lexapro 10 mg. Continue same  Orders:  -     escitalopram (LEXAPRO) 10 MG tablet; Take 2 tablets by mouth daily, Disp-30 tablet, R-5Normal    Return in about 6 months (around 2023) for CPE. SUBJECTIVE     28 y.o. female established patient here for:   Chief Complaint   Patient presents with    Follow-up     Doing  well with addition of WB  (dropped lexapro to 10 mg). First week felt especially good, but still okay. Also stop smoking. Has a cold since last week. Did several COVID tests were negative. Now into some sore throat and hoarseness. Does add in that they had to put her dog to sleep on Monday she has been crying a lot and thinks that worsened it. No fevers no shortness of breath      DIONI 7 SCORE 2022   DIONI-7 Total Score 4 9 3 3 4 0 5   DIONI-7 Total Score - - - - - - -     Interpretation of DIONI-7 score: 5-9 = mild anxiety, 10-14 = moderate anxiety, 15+ = severe anxiety. Recommend referral to behavioral health for scores 10 or greater.   PHQ Scores 2022   PHQ2 Score 2 3 2 2 6 1 4   PHQ9 Score 5 12 9 10 16 4 16     Interpretation of Total Score Depression Severity: 1-4 = Minimal depression, 5-9 = Mild depression, 10-14 = Moderate depression, 15-19 = Moderately severe depression, 20-27 = Severe depression    Review of Systems    Outpatient Medications Marked as Taking for the 23 encounter (Office Visit) with January Hassan MD   Medication Sig Dispense Refill    buPROPion (WELLBUTRIN XL) 150 MG extended release tablet Take 1 tablet by mouth every morning 30 tablet 5    escitalopram (LEXAPRO) 10 MG tablet Take 2 tablets by mouth daily 30 tablet 5    amphetamine-dextroamphetamine (ADDERALL XR) 25 MG extended release capsule Take 1 capsule by mouth every morning for 30 days. 30 capsule 0       OBJECTIVE:  Vitals:    01/20/23 1038   BP: 128/70   Pulse: 87   Temp: 97.6 °F (36.4 °C)   TempSrc: Temporal   SpO2: 98%   Weight: 118 lb 9.6 oz (53.8 kg)     Physical Exam  HENT:      Right Ear: Tympanic membrane normal.      Mouth/Throat:      Pharynx: No oropharyngeal exudate or posterior oropharyngeal erythema. Cardiovascular:      Heart sounds: Normal heart sounds. Pulmonary:      Breath sounds: Normal breath sounds. Psychiatric:         Mood and Affect: Mood normal.       This note was generated completely or in part utilizing Dragon dictation speech recognition software. Occasionally, words are mistranscribed and despite editing, the text may contain inaccuracies due to incorrect word recognition.   If further clarification is needed please contact the office at [phone]  --Haylee Simms MD

## 2023-02-14 DIAGNOSIS — F90.2 ATTENTION DEFICIT HYPERACTIVITY DISORDER (ADHD), COMBINED TYPE: ICD-10-CM

## 2023-02-15 RX ORDER — BUPROPION HYDROCHLORIDE 150 MG/1
150 TABLET ORAL EVERY MORNING
Qty: 30 TABLET | Refills: 5 | Status: SHIPPED | OUTPATIENT
Start: 2023-02-15

## 2023-02-15 RX ORDER — DEXTROAMPHETAMINE SACCHARATE, AMPHETAMINE ASPARTATE MONOHYDRATE, DEXTROAMPHETAMINE SULFATE AND AMPHETAMINE SULFATE 6.25; 6.25; 6.25; 6.25 MG/1; MG/1; MG/1; MG/1
25 CAPSULE, EXTENDED RELEASE ORAL EVERY MORNING
Qty: 30 CAPSULE | Refills: 0 | Status: SHIPPED | OUTPATIENT
Start: 2023-02-15 | End: 2023-03-17

## 2023-02-15 NOTE — TELEPHONE ENCOUNTER
Last appointment: 1/20/2023  Next appointment: 7/21/2023  Last refill: 1/16/23 Adderall, 1/20/23 Bupropion

## 2023-02-21 DIAGNOSIS — F33.1 MAJOR DEPRESSIVE DISORDER, RECURRENT, MODERATE (HCC): ICD-10-CM

## 2023-02-21 RX ORDER — ESCITALOPRAM OXALATE 10 MG/1
20 TABLET ORAL DAILY
Qty: 60 TABLET | Refills: 5 | Status: SHIPPED | OUTPATIENT
Start: 2023-02-21

## 2023-02-21 NOTE — TELEPHONE ENCOUNTER
Pharmacy is calling regarding the following prescription for:  escitalopram (LEXAPRO) 10 MG tablet    Dispense quantity is only 30 and patient is to take 2 tablets per day. Pharmacist is wondering if this can be changed to quantity of 60.  Please call to update or send in new prescription if possible    [facility] [address] [place] [address] [phone] - F [phone]   [facility], [address] [address]   Phone:  [phone]  Fax:  [phone]

## 2023-03-16 DIAGNOSIS — F90.2 ATTENTION DEFICIT HYPERACTIVITY DISORDER (ADHD), COMBINED TYPE: ICD-10-CM

## 2023-03-17 RX ORDER — DEXTROAMPHETAMINE SACCHARATE, AMPHETAMINE ASPARTATE MONOHYDRATE, DEXTROAMPHETAMINE SULFATE AND AMPHETAMINE SULFATE 6.25; 6.25; 6.25; 6.25 MG/1; MG/1; MG/1; MG/1
25 CAPSULE, EXTENDED RELEASE ORAL EVERY MORNING
Qty: 30 CAPSULE | Refills: 0 | Status: SHIPPED | OUTPATIENT
Start: 2023-03-17 | End: 2023-04-16

## 2023-05-05 ENCOUNTER — TELEPHONE (OUTPATIENT)
Dept: INTERNAL MEDICINE CLINIC | Age: 28
End: 2023-05-05

## 2023-05-05 NOTE — TELEPHONE ENCOUNTER
Patient called in with concern of  \"very bloody stool\" that began yesterday. Patient denies any pain with bowel movements, any nausea, or lightheadedness. He states she is having solid bowel movements but was having loose ones earlier in the week. Patient states the blood has increased/worsened today. Per MA, advised patient to go to ED. Patient understood and stated she will go.

## 2023-05-17 DIAGNOSIS — F90.2 ATTENTION DEFICIT HYPERACTIVITY DISORDER (ADHD), COMBINED TYPE: ICD-10-CM

## 2023-05-17 RX ORDER — DEXTROAMPHETAMINE SACCHARATE, AMPHETAMINE ASPARTATE MONOHYDRATE, DEXTROAMPHETAMINE SULFATE AND AMPHETAMINE SULFATE 6.25; 6.25; 6.25; 6.25 MG/1; MG/1; MG/1; MG/1
25 CAPSULE, EXTENDED RELEASE ORAL EVERY MORNING
Qty: 30 CAPSULE | Refills: 0 | Status: SHIPPED | OUTPATIENT
Start: 2023-05-17 | End: 2023-06-16

## 2023-07-16 DIAGNOSIS — F33.1 MAJOR DEPRESSIVE DISORDER, RECURRENT, MODERATE (HCC): ICD-10-CM

## 2023-07-16 DIAGNOSIS — F90.2 ATTENTION DEFICIT HYPERACTIVITY DISORDER (ADHD), COMBINED TYPE: ICD-10-CM

## 2023-07-17 RX ORDER — DEXTROAMPHETAMINE SACCHARATE, AMPHETAMINE ASPARTATE MONOHYDRATE, DEXTROAMPHETAMINE SULFATE AND AMPHETAMINE SULFATE 6.25; 6.25; 6.25; 6.25 MG/1; MG/1; MG/1; MG/1
25 CAPSULE, EXTENDED RELEASE ORAL EVERY MORNING
Qty: 30 CAPSULE | Refills: 0 | Status: SHIPPED | OUTPATIENT
Start: 2023-07-17 | End: 2023-08-16

## 2023-07-17 RX ORDER — ESCITALOPRAM OXALATE 10 MG/1
20 TABLET ORAL DAILY
Qty: 60 TABLET | Refills: 5 | Status: SHIPPED | OUTPATIENT
Start: 2023-07-17 | End: 2023-07-21 | Stop reason: DRUGHIGH

## 2023-07-21 ENCOUNTER — OFFICE VISIT (OUTPATIENT)
Dept: INTERNAL MEDICINE CLINIC | Age: 28
End: 2023-07-21
Payer: COMMERCIAL

## 2023-07-21 VITALS
WEIGHT: 120 LBS | HEART RATE: 79 BPM | OXYGEN SATURATION: 99 % | SYSTOLIC BLOOD PRESSURE: 117 MMHG | HEIGHT: 62 IN | DIASTOLIC BLOOD PRESSURE: 73 MMHG | BODY MASS INDEX: 22.08 KG/M2

## 2023-07-21 DIAGNOSIS — Z13.220 LIPID SCREENING: ICD-10-CM

## 2023-07-21 DIAGNOSIS — Z00.00 WELL ADULT EXAM: Primary | ICD-10-CM

## 2023-07-21 DIAGNOSIS — K92.1 BLOODY STOOL: ICD-10-CM

## 2023-07-21 DIAGNOSIS — Z11.59 ENCOUNTER FOR HEPATITIS C SCREENING TEST FOR LOW RISK PATIENT: ICD-10-CM

## 2023-07-21 DIAGNOSIS — F90.2 ATTENTION DEFICIT HYPERACTIVITY DISORDER (ADHD), COMBINED TYPE: ICD-10-CM

## 2023-07-21 DIAGNOSIS — F33.1 MAJOR DEPRESSIVE DISORDER, RECURRENT, MODERATE (HCC): ICD-10-CM

## 2023-07-21 LAB
CHOLEST SERPL-MCNC: 177 MG/DL (ref 0–199)
HCV AB SERPL QL IA: NORMAL
HDLC SERPL-MCNC: 70 MG/DL (ref 40–60)
LDLC SERPL CALC-MCNC: 95 MG/DL
TRIGL SERPL-MCNC: 61 MG/DL (ref 0–150)
VLDLC SERPL CALC-MCNC: 12 MG/DL

## 2023-07-21 PROCEDURE — 99395 PREV VISIT EST AGE 18-39: CPT | Performed by: INTERNAL MEDICINE

## 2023-07-21 RX ORDER — ESCITALOPRAM OXALATE 10 MG/1
10 TABLET ORAL DAILY
Qty: 30 TABLET | Refills: 5 | Status: SHIPPED | OUTPATIENT
Start: 2023-07-21

## 2023-07-21 RX ORDER — DEXTROAMPHETAMINE SACCHARATE, AMPHETAMINE ASPARTATE, DEXTROAMPHETAMINE SULFATE AND AMPHETAMINE SULFATE 2.5; 2.5; 2.5; 2.5 MG/1; MG/1; MG/1; MG/1
10 TABLET ORAL DAILY
Qty: 28 TABLET | Refills: 0 | Status: SHIPPED | OUTPATIENT
Start: 2023-07-21 | End: 2023-08-18

## 2023-07-21 SDOH — ECONOMIC STABILITY: FOOD INSECURITY: WITHIN THE PAST 12 MONTHS, YOU WORRIED THAT YOUR FOOD WOULD RUN OUT BEFORE YOU GOT MONEY TO BUY MORE.: NEVER TRUE

## 2023-07-21 SDOH — ECONOMIC STABILITY: HOUSING INSECURITY
IN THE LAST 12 MONTHS, WAS THERE A TIME WHEN YOU DID NOT HAVE A STEADY PLACE TO SLEEP OR SLEPT IN A SHELTER (INCLUDING NOW)?: NO

## 2023-07-21 SDOH — ECONOMIC STABILITY: INCOME INSECURITY: HOW HARD IS IT FOR YOU TO PAY FOR THE VERY BASICS LIKE FOOD, HOUSING, MEDICAL CARE, AND HEATING?: NOT HARD AT ALL

## 2023-07-21 SDOH — ECONOMIC STABILITY: FOOD INSECURITY: WITHIN THE PAST 12 MONTHS, THE FOOD YOU BOUGHT JUST DIDN'T LAST AND YOU DIDN'T HAVE MONEY TO GET MORE.: NEVER TRUE

## 2023-07-21 ASSESSMENT — ENCOUNTER SYMPTOMS
EYES NEGATIVE: 1
RESPIRATORY NEGATIVE: 1
BLOOD IN STOOL: 1

## 2023-07-21 NOTE — ASSESSMENT & PLAN NOTE
Chronic problem, not adequately controlled. Continue  Adderall XR 5 mg every morning. Add Adderall 10 mg early afternoon.

## 2023-07-21 NOTE — PROGRESS NOTES
ASSESSMENT/PLAN:   Wellness exam  Discussed age appropriate preventive care including healthy diet, daily exercise, immunizations and age & gender guided screening tests. Patient will establish with dermatologist and OB/GYN. Attention deficit hyperactivity disorder (ADHD), combined type  Assessment & Plan:  Chronic problem, not adequately controlled. Continue  Adderall XR 5 mg every morning. Add Adderall 10 mg early afternoon. Orders:  -     amphetamine-dextroamphetamine (ADDERALL, 10MG,) 10 MG tablet; Take 1 tablet by mouth daily for 28 days. take early afternoon Max Daily Amount: 10 mg, Disp-28 tablet, R-0Normal    Major depressive disorder, recurrent, moderate (HCC)  Well-controlled  -     escitalopram (LEXAPRO) 10 MG tablet; Take 1 tablet by mouth daily, Disp-30 tablet, R-5Normal    Bloody sool  Several episodes, not classic hemorrhoid symptoms. Referring to GI. Return in about 6 months (around 2024). Alfredo Tee (:  1995) is a 33 y.o. female, here for annual preventive visit. May be looking at IVF. Have learned that has been infertile, but working on lifestyle to see if he can get sperm counts up enough for IVF. Due for her Pap this year and needs a new OB/GYN. Bloody stool about a month ago. Was mixed through stool, lasted 4 to 5 days. Does have some straining. Has had this happen once or twice before. Has never been evaluated. ADHD: Adderall XR going well for her morning into early afternoon but then crashes midday and has to use either large amounts of caffeine or just take a nap. Also mentions that she needs to establish with a dermatologist.  Has had a basal cell removed from her back. Has seen some changes in a few moles. Exercise: walks, owns farm, very active  Diet: very healthy    No LMP recorded.        Patient Care Team:  Austen Vasques MD as PCP - General (Internal Medicine)  Austen Vasques MD as PCP - Empaneled Provider  Briana Avilez MD as

## 2023-08-15 RX ORDER — BUPROPION HYDROCHLORIDE 150 MG/1
150 TABLET ORAL EVERY MORNING
Qty: 30 TABLET | Refills: 5 | Status: SHIPPED | OUTPATIENT
Start: 2023-08-15

## 2023-08-17 DIAGNOSIS — F90.2 ATTENTION DEFICIT HYPERACTIVITY DISORDER (ADHD), COMBINED TYPE: ICD-10-CM

## 2023-08-18 RX ORDER — DEXTROAMPHETAMINE SACCHARATE, AMPHETAMINE ASPARTATE, DEXTROAMPHETAMINE SULFATE AND AMPHETAMINE SULFATE 2.5; 2.5; 2.5; 2.5 MG/1; MG/1; MG/1; MG/1
10 TABLET ORAL DAILY
Qty: 28 TABLET | Refills: 0 | Status: SHIPPED | OUTPATIENT
Start: 2023-08-18 | End: 2023-09-15

## 2023-08-18 RX ORDER — DEXTROAMPHETAMINE SACCHARATE, AMPHETAMINE ASPARTATE MONOHYDRATE, DEXTROAMPHETAMINE SULFATE AND AMPHETAMINE SULFATE 6.25; 6.25; 6.25; 6.25 MG/1; MG/1; MG/1; MG/1
25 CAPSULE, EXTENDED RELEASE ORAL EVERY MORNING
Qty: 30 CAPSULE | Refills: 0 | Status: SHIPPED | OUTPATIENT
Start: 2023-08-18 | End: 2023-09-17

## 2023-08-18 NOTE — TELEPHONE ENCOUNTER
Last appointment: 7/21/2023  Next appointment: Visit date not found  Last refill: 10 mg 7/21/23 - 20mg 7/17/23

## 2023-09-11 ENCOUNTER — PATIENT MESSAGE (OUTPATIENT)
Dept: INTERNAL MEDICINE CLINIC | Age: 28
End: 2023-09-11

## 2023-09-11 DIAGNOSIS — F90.2 ATTENTION DEFICIT HYPERACTIVITY DISORDER (ADHD), COMBINED TYPE: ICD-10-CM

## 2023-09-11 DIAGNOSIS — F33.1 MAJOR DEPRESSIVE DISORDER, RECURRENT, MODERATE (HCC): ICD-10-CM

## 2023-09-11 RX ORDER — DEXTROAMPHETAMINE SACCHARATE, AMPHETAMINE ASPARTATE, DEXTROAMPHETAMINE SULFATE AND AMPHETAMINE SULFATE 2.5; 2.5; 2.5; 2.5 MG/1; MG/1; MG/1; MG/1
10 TABLET ORAL DAILY
Qty: 30 TABLET | Refills: 0 | Status: SHIPPED | OUTPATIENT
Start: 2023-09-15 | End: 2023-10-15

## 2023-09-11 RX ORDER — DEXTROAMPHETAMINE SACCHARATE, AMPHETAMINE ASPARTATE MONOHYDRATE, DEXTROAMPHETAMINE SULFATE AND AMPHETAMINE SULFATE 6.25; 6.25; 6.25; 6.25 MG/1; MG/1; MG/1; MG/1
25 CAPSULE, EXTENDED RELEASE ORAL EVERY MORNING
Qty: 30 CAPSULE | Refills: 0 | Status: SHIPPED | OUTPATIENT
Start: 2023-09-15 | End: 2023-10-15

## 2023-09-11 RX ORDER — MONTELUKAST SODIUM 10 MG/1
10 TABLET ORAL NIGHTLY
Qty: 30 TABLET | Refills: 5 | Status: SHIPPED | OUTPATIENT
Start: 2023-09-11

## 2023-09-11 RX ORDER — BUPROPION HYDROCHLORIDE 150 MG/1
150 TABLET ORAL EVERY MORNING
Qty: 30 TABLET | Refills: 5 | Status: SHIPPED | OUTPATIENT
Start: 2023-09-11

## 2023-09-11 RX ORDER — ESCITALOPRAM OXALATE 10 MG/1
10 TABLET ORAL DAILY
Qty: 30 TABLET | Refills: 5 | Status: SHIPPED | OUTPATIENT
Start: 2023-09-11

## 2023-09-11 NOTE — TELEPHONE ENCOUNTER
From: Jennifer Landry  To: Dr. Kristie Judge  Sent: 9/11/2023 12:34 PM EDT  Subject: Seasonal Allergies    Hi ,     Like most everyone right now, my allergies are horrible. I am taking an Allegra 180 mg pill to help combat it as well as a 10mg Montelukast tab daily. The Montelukast sodium tablet was a previous prescription from when we lived in [place]. I am almost out of them. Is this something I can get a prescription for or should I make an appointment to come in? Thank you so much! Seasonal allergies are the worst and seem to be getting worse with age. Hope you are doing well!

## 2023-09-11 NOTE — TELEPHONE ENCOUNTER
Last appointment: 7/21/2023  Next appointment: Visit date not found  Last refill: 8/18/2023 8/15/2023   Left message for patient to call and schedule due/overdue appointment.

## 2023-10-16 DIAGNOSIS — F90.2 ATTENTION DEFICIT HYPERACTIVITY DISORDER (ADHD), COMBINED TYPE: ICD-10-CM

## 2023-10-16 RX ORDER — BUPROPION HYDROCHLORIDE 150 MG/1
150 TABLET ORAL EVERY MORNING
Qty: 30 TABLET | Refills: 5 | Status: SHIPPED | OUTPATIENT
Start: 2023-10-16

## 2023-10-16 RX ORDER — DEXTROAMPHETAMINE SACCHARATE, AMPHETAMINE ASPARTATE, DEXTROAMPHETAMINE SULFATE AND AMPHETAMINE SULFATE 2.5; 2.5; 2.5; 2.5 MG/1; MG/1; MG/1; MG/1
10 TABLET ORAL DAILY
Qty: 30 TABLET | Refills: 0 | Status: SHIPPED | OUTPATIENT
Start: 2023-10-16 | End: 2023-11-15

## 2023-10-16 RX ORDER — DEXTROAMPHETAMINE SACCHARATE, AMPHETAMINE ASPARTATE MONOHYDRATE, DEXTROAMPHETAMINE SULFATE AND AMPHETAMINE SULFATE 6.25; 6.25; 6.25; 6.25 MG/1; MG/1; MG/1; MG/1
25 CAPSULE, EXTENDED RELEASE ORAL EVERY MORNING
Qty: 30 CAPSULE | Refills: 0 | Status: SHIPPED | OUTPATIENT
Start: 2023-10-16 | End: 2023-11-15

## 2023-11-13 DIAGNOSIS — F33.1 MAJOR DEPRESSIVE DISORDER, RECURRENT, MODERATE (HCC): ICD-10-CM

## 2023-11-13 DIAGNOSIS — F90.2 ATTENTION DEFICIT HYPERACTIVITY DISORDER (ADHD), COMBINED TYPE: ICD-10-CM

## 2023-11-14 RX ORDER — BUPROPION HYDROCHLORIDE 150 MG/1
150 TABLET ORAL EVERY MORNING
Qty: 30 TABLET | Refills: 5 | Status: SHIPPED | OUTPATIENT
Start: 2023-11-14

## 2023-11-14 RX ORDER — DEXTROAMPHETAMINE SACCHARATE, AMPHETAMINE ASPARTATE, DEXTROAMPHETAMINE SULFATE AND AMPHETAMINE SULFATE 2.5; 2.5; 2.5; 2.5 MG/1; MG/1; MG/1; MG/1
10 TABLET ORAL DAILY
Qty: 30 TABLET | Refills: 0 | Status: SHIPPED | OUTPATIENT
Start: 2023-11-14 | End: 2023-12-14

## 2023-11-14 RX ORDER — ESCITALOPRAM OXALATE 10 MG/1
10 TABLET ORAL DAILY
Qty: 30 TABLET | Refills: 5 | Status: SHIPPED | OUTPATIENT
Start: 2023-11-14

## 2023-11-14 RX ORDER — DEXTROAMPHETAMINE SACCHARATE, AMPHETAMINE ASPARTATE MONOHYDRATE, DEXTROAMPHETAMINE SULFATE AND AMPHETAMINE SULFATE 6.25; 6.25; 6.25; 6.25 MG/1; MG/1; MG/1; MG/1
25 CAPSULE, EXTENDED RELEASE ORAL EVERY MORNING
Qty: 30 CAPSULE | Refills: 0 | Status: SHIPPED | OUTPATIENT
Start: 2023-11-14 | End: 2023-12-14

## 2023-11-14 NOTE — TELEPHONE ENCOUNTER
Last appointment: 7/21/2023  Next appointment: 1/16/2024  Last refill: 9/11/23 and 10/16/23  Called pharmacy they do not have any refills on file for Bupropion and Lexapro even though refills sent. Can we please resend?

## 2023-12-13 DIAGNOSIS — F33.1 MAJOR DEPRESSIVE DISORDER, RECURRENT, MODERATE (HCC): ICD-10-CM

## 2023-12-13 DIAGNOSIS — F90.2 ATTENTION DEFICIT HYPERACTIVITY DISORDER (ADHD), COMBINED TYPE: ICD-10-CM

## 2023-12-13 RX ORDER — DEXTROAMPHETAMINE SACCHARATE, AMPHETAMINE ASPARTATE MONOHYDRATE, DEXTROAMPHETAMINE SULFATE AND AMPHETAMINE SULFATE 6.25; 6.25; 6.25; 6.25 MG/1; MG/1; MG/1; MG/1
25 CAPSULE, EXTENDED RELEASE ORAL EVERY MORNING
Qty: 30 CAPSULE | Refills: 0 | Status: SHIPPED | OUTPATIENT
Start: 2023-12-13 | End: 2024-01-12

## 2023-12-13 RX ORDER — BUPROPION HYDROCHLORIDE 150 MG/1
150 TABLET ORAL EVERY MORNING
Qty: 30 TABLET | Refills: 5 | Status: SHIPPED | OUTPATIENT
Start: 2023-12-13

## 2023-12-13 RX ORDER — ESCITALOPRAM OXALATE 10 MG/1
10 TABLET ORAL DAILY
Qty: 30 TABLET | Refills: 5 | Status: SHIPPED | OUTPATIENT
Start: 2023-12-13

## 2023-12-13 RX ORDER — DEXTROAMPHETAMINE SACCHARATE, AMPHETAMINE ASPARTATE, DEXTROAMPHETAMINE SULFATE AND AMPHETAMINE SULFATE 2.5; 2.5; 2.5; 2.5 MG/1; MG/1; MG/1; MG/1
10 TABLET ORAL DAILY
Qty: 30 TABLET | Refills: 0 | Status: SHIPPED | OUTPATIENT
Start: 2023-12-13 | End: 2024-01-12

## 2023-12-13 NOTE — TELEPHONE ENCOUNTER
Medication:   Requested Prescriptions     Pending Prescriptions Disp Refills    escitalopram (LEXAPRO) 10 MG tablet 30 tablet 5     Sig: Take 1 tablet by mouth daily    buPROPion (WELLBUTRIN XL) 150 MG extended release tablet 30 tablet 5     Sig: Take 1 tablet by mouth every morning    amphetamine-dextroamphetamine (ADDERALL XR) 25 MG extended release capsule 30 capsule 0     Sig: Take 1 capsule by mouth every morning for 30 days. amphetamine-dextroamphetamine (ADDERALL, 10MG,) 10 MG tablet 30 tablet 0     Sig: Take 1 tablet by mouth daily for 30 days.  take early afternoon Max Daily Amount: 10 mg     Last Filled:  11/14/23    Last appt: 7/21/2023   Next appt: 1/16/2024    Last OARRS:        No data to display

## 2024-01-14 DIAGNOSIS — F90.2 ATTENTION DEFICIT HYPERACTIVITY DISORDER (ADHD), COMBINED TYPE: ICD-10-CM

## 2024-01-15 RX ORDER — BUPROPION HYDROCHLORIDE 150 MG/1
150 TABLET ORAL EVERY MORNING
Qty: 30 TABLET | Refills: 5 | Status: SHIPPED | OUTPATIENT
Start: 2024-01-15

## 2024-01-15 RX ORDER — DEXTROAMPHETAMINE SACCHARATE, AMPHETAMINE ASPARTATE MONOHYDRATE, DEXTROAMPHETAMINE SULFATE AND AMPHETAMINE SULFATE 6.25; 6.25; 6.25; 6.25 MG/1; MG/1; MG/1; MG/1
25 CAPSULE, EXTENDED RELEASE ORAL EVERY MORNING
Qty: 30 CAPSULE | Refills: 0 | Status: SHIPPED | OUTPATIENT
Start: 2024-01-15 | End: 2024-02-14

## 2024-01-15 RX ORDER — DEXTROAMPHETAMINE SACCHARATE, AMPHETAMINE ASPARTATE, DEXTROAMPHETAMINE SULFATE AND AMPHETAMINE SULFATE 2.5; 2.5; 2.5; 2.5 MG/1; MG/1; MG/1; MG/1
10 TABLET ORAL DAILY
Qty: 30 TABLET | Refills: 0 | Status: SHIPPED | OUTPATIENT
Start: 2024-01-15 | End: 2024-02-14

## 2024-02-09 ENCOUNTER — OFFICE VISIT (OUTPATIENT)
Dept: INTERNAL MEDICINE CLINIC | Age: 29
End: 2024-02-09
Payer: COMMERCIAL

## 2024-02-09 VITALS
BODY MASS INDEX: 21.54 KG/M2 | DIASTOLIC BLOOD PRESSURE: 68 MMHG | OXYGEN SATURATION: 99 % | HEART RATE: 96 BPM | WEIGHT: 117.06 LBS | SYSTOLIC BLOOD PRESSURE: 124 MMHG | HEIGHT: 62 IN

## 2024-02-09 DIAGNOSIS — F90.2 ATTENTION DEFICIT HYPERACTIVITY DISORDER (ADHD), COMBINED TYPE: Primary | ICD-10-CM

## 2024-02-09 DIAGNOSIS — F33.1 MAJOR DEPRESSIVE DISORDER, RECURRENT, MODERATE (HCC): ICD-10-CM

## 2024-02-09 PROCEDURE — 99213 OFFICE O/P EST LOW 20 MIN: CPT | Performed by: INTERNAL MEDICINE

## 2024-02-09 RX ORDER — BUPROPION HYDROCHLORIDE 150 MG/1
150 TABLET ORAL EVERY MORNING
Qty: 90 TABLET | Refills: 3 | Status: SHIPPED | OUTPATIENT
Start: 2024-02-09

## 2024-02-09 RX ORDER — ESCITALOPRAM OXALATE 10 MG/1
10 TABLET ORAL DAILY
Qty: 90 TABLET | Refills: 3 | Status: SHIPPED | OUTPATIENT
Start: 2024-02-09

## 2024-02-09 RX ORDER — DEXTROAMPHETAMINE SACCHARATE, AMPHETAMINE ASPARTATE, DEXTROAMPHETAMINE SULFATE AND AMPHETAMINE SULFATE 2.5; 2.5; 2.5; 2.5 MG/1; MG/1; MG/1; MG/1
10 TABLET ORAL DAILY
Qty: 90 TABLET | Refills: 0 | Status: SHIPPED | OUTPATIENT
Start: 2024-02-09 | End: 2024-05-09

## 2024-02-09 RX ORDER — DEXTROAMPHETAMINE SACCHARATE, AMPHETAMINE ASPARTATE MONOHYDRATE, DEXTROAMPHETAMINE SULFATE AND AMPHETAMINE SULFATE 6.25; 6.25; 6.25; 6.25 MG/1; MG/1; MG/1; MG/1
25 CAPSULE, EXTENDED RELEASE ORAL EVERY MORNING
Qty: 90 CAPSULE | Refills: 0 | Status: SHIPPED | OUTPATIENT
Start: 2024-02-09 | End: 2024-05-09

## 2024-02-09 ASSESSMENT — PATIENT HEALTH QUESTIONNAIRE - PHQ9
10. IF YOU CHECKED OFF ANY PROBLEMS, HOW DIFFICULT HAVE THESE PROBLEMS MADE IT FOR YOU TO DO YOUR WORK, TAKE CARE OF THINGS AT HOME, OR GET ALONG WITH OTHER PEOPLE: 1
7. TROUBLE CONCENTRATING ON THINGS, SUCH AS READING THE NEWSPAPER OR WATCHING TELEVISION: 0
2. FEELING DOWN, DEPRESSED OR HOPELESS: 1
3. TROUBLE FALLING OR STAYING ASLEEP: 1
SUM OF ALL RESPONSES TO PHQ9 QUESTIONS 1 & 2: 2
4. FEELING TIRED OR HAVING LITTLE ENERGY: 1
8. MOVING OR SPEAKING SO SLOWLY THAT OTHER PEOPLE COULD HAVE NOTICED. OR THE OPPOSITE, BEING SO FIGETY OR RESTLESS THAT YOU HAVE BEEN MOVING AROUND A LOT MORE THAN USUAL: 0
SUM OF ALL RESPONSES TO PHQ QUESTIONS 1-9: 4
5. POOR APPETITE OR OVEREATING: 0
SUM OF ALL RESPONSES TO PHQ QUESTIONS 1-9: 4
1. LITTLE INTEREST OR PLEASURE IN DOING THINGS: 1
6. FEELING BAD ABOUT YOURSELF - OR THAT YOU ARE A FAILURE OR HAVE LET YOURSELF OR YOUR FAMILY DOWN: 0
9. THOUGHTS THAT YOU WOULD BE BETTER OFF DEAD, OR OF HURTING YOURSELF: 0
SUM OF ALL RESPONSES TO PHQ QUESTIONS 1-9: 4
SUM OF ALL RESPONSES TO PHQ QUESTIONS 1-9: 4

## 2024-02-09 NOTE — ASSESSMENT & PLAN NOTE
Doing well with current regimen of Adderall XR 25 mg every morning and 10 mg short acting dose in the afternoon.  No concern for abuse or misuse.  90-day supply ordered.

## 2024-02-09 NOTE — PATIENT INSTRUCTIONS
[email or web address].org/    Light Therapy for Seasonal Depression  Three key elements for effectiveness  Light therapy is most effective when you have the proper combination of light intensity, duration and timing    Intensity. The intensity of the light box is recorded in lux, which is a measure of the amount of light you receive. For SAD, the typical recommendation is to use a 10,000-lux light box at a distance of about 16 to 24 inches (41 to 61 centimeters) from your face.    Duration. With a 10,000-lux light box, light therapy typically involves daily sessions of about 20 to 30 minutes. But a lower-intensity light box, such as 2,500 lux, may require longer sessions. Check the 's guidelines and follow your doctor's instructions. He or she may suggest you start with shorter sessions and gradually increase the time.    Timing. For most people, light therapy is most effective when it's done early in the morning, after you first wake up. Your doctor can help you determine the light therapy schedule that works best.    [place] Happylight FullSize 10,000 is one example.  The lamp is a 46734 lux lamp. Available on [employer] along with others, approx $40-60    Check of immunization records.

## 2024-02-09 NOTE — ASSESSMENT & PLAN NOTE
Overall stable, slightly more symptomatic in the winter.  Continue Lexapro 10, Wellbutrin  and encourage light therapy.

## 2024-02-09 NOTE — PROGRESS NOTES
Odalys Orozco   :  1995    ASSESSMENT/PLAN:   Attention deficit hyperactivity disorder (ADHD), combined type  Assessment & Plan:  Doing well with current regimen of Adderall XR 25 mg every morning and 10 mg short acting dose in the afternoon.  No concern for abuse or misuse.  90-day supply ordered.    Orders:  -     amphetamine-dextroamphetamine (ADDERALL XR) 25 MG extended release capsule; Take 1 capsule by mouth every morning for 90 days. Max Daily Amount: 25 mg, Disp-90 capsule, R-0Normal  -     amphetamine-dextroamphetamine (ADDERALL, 10MG,) 10 MG tablet; Take 1 tablet by mouth daily for 90 days. take early afternoon Max Daily Amount: 10 mg, Disp-90 tablet, R-0Normal  Major depressive disorder, recurrent, moderate (HCC)  Assessment & Plan:  Overall stable, slightly more symptomatic in the winter.  Continue Lexapro 10, Wellbutrin  and encourage light therapy.  Orders:  -     escitalopram (LEXAPRO) 10 MG tablet; Take 1 tablet by mouth daily, Disp-90 tablet, R-3Normal      Return in about 6 months (around 2024) for CPE, ADD.        SUBJECTIVE     28 y.o. female established patient here for: Follow-up    Overall doing well. Finally accepted that does better if not skipping the booster dose    Still has about 1 day/week when doesn't want to get out of bed.  Tends to be more of a seasonal thing and possibly situational as well.  Will sometimes feel overwhelmed.    Review of Systems    Outpatient Medications Marked as Taking for the 24 encounter (Office Visit) with Darleen Go MD   Medication Sig Dispense Refill    amphetamine-dextroamphetamine (ADDERALL XR) 25 MG extended release capsule Take 1 capsule by mouth every morning for 90 days. Max Daily Amount: 25 mg 90 capsule 0    amphetamine-dextroamphetamine (ADDERALL, 10MG,) 10 MG tablet Take 1 tablet by mouth daily for 90 days. take early afternoon Max Daily Amount: 10 mg 90 tablet 0    buPROPion (WELLBUTRIN XL) 150 MG extended

## 2024-02-14 DIAGNOSIS — F90.2 ATTENTION DEFICIT HYPERACTIVITY DISORDER (ADHD), COMBINED TYPE: ICD-10-CM

## 2024-02-14 RX ORDER — DEXTROAMPHETAMINE SACCHARATE, AMPHETAMINE ASPARTATE, DEXTROAMPHETAMINE SULFATE AND AMPHETAMINE SULFATE 2.5; 2.5; 2.5; 2.5 MG/1; MG/1; MG/1; MG/1
10 TABLET ORAL DAILY
Qty: 90 TABLET | Refills: 0 | OUTPATIENT
Start: 2024-02-14 | End: 2024-05-14

## 2024-02-14 RX ORDER — DEXTROAMPHETAMINE SACCHARATE, AMPHETAMINE ASPARTATE MONOHYDRATE, DEXTROAMPHETAMINE SULFATE AND AMPHETAMINE SULFATE 6.25; 6.25; 6.25; 6.25 MG/1; MG/1; MG/1; MG/1
25 CAPSULE, EXTENDED RELEASE ORAL EVERY MORNING
Qty: 90 CAPSULE | Refills: 0 | OUTPATIENT
Start: 2024-02-14 | End: 2024-05-14

## 2024-02-14 NOTE — TELEPHONE ENCOUNTER
Last appointment: 2/9/2024  Next appointment: Visit date not found (Due 8/9/24)  Last refill: 2/9/24

## 2024-04-12 ENCOUNTER — HOSPITAL ENCOUNTER (OUTPATIENT)
Age: 29
Setting detail: OBSERVATION
LOS: 1 days | Discharge: HOME | End: 2024-04-13
Attending: EMERGENCY MEDICINE | Admitting: PSYCHIATRY & NEUROLOGY
Payer: COMMERCIAL

## 2024-04-12 ENCOUNTER — TELEPHONE (OUTPATIENT)
Dept: PSYCHIATRY | Age: 29
End: 2024-04-12

## 2024-04-12 ENCOUNTER — OFFICE VISIT (OUTPATIENT)
Dept: INTERNAL MEDICINE CLINIC | Age: 29
End: 2024-04-12
Payer: COMMERCIAL

## 2024-04-12 VITALS
SYSTOLIC BLOOD PRESSURE: 100 MMHG | WEIGHT: 118.8 LBS | OXYGEN SATURATION: 100 % | DIASTOLIC BLOOD PRESSURE: 70 MMHG | HEART RATE: 84 BPM | BODY MASS INDEX: 21.73 KG/M2

## 2024-04-12 DIAGNOSIS — F39 MOOD DISORDER (HCC): Primary | ICD-10-CM

## 2024-04-12 DIAGNOSIS — F33.2 SEVERE EPISODE OF RECURRENT MAJOR DEPRESSIVE DISORDER, WITHOUT PSYCHOTIC FEATURES (HCC): Primary | ICD-10-CM

## 2024-04-12 DIAGNOSIS — R45.851 SUICIDAL IDEATION: ICD-10-CM

## 2024-04-12 PROBLEM — F33.9 MAJOR DEPRESSION, RECURRENT (HCC): Status: ACTIVE | Noted: 2024-04-12

## 2024-04-12 PROBLEM — F33.9 MAJOR DEPRESSIVE DISORDER, RECURRENT, UNSPECIFIED (HCC): Status: ACTIVE | Noted: 2024-04-12

## 2024-04-12 LAB
ALBUMIN SERPL-MCNC: 4.3 G/DL (ref 3.4–5)
ALBUMIN/GLOB SERPL: 1.4 {RATIO} (ref 1.1–2.2)
ALP SERPL-CCNC: 63 U/L (ref 40–129)
ALT SERPL-CCNC: 11 U/L (ref 10–40)
AMORPH SED URNS QL MICRO: ABNORMAL /HPF
AMPHETAMINES UR QL SCN>1000 NG/ML: POSITIVE
ANION GAP SERPL CALCULATED.3IONS-SCNC: 10 MMOL/L (ref 3–16)
APAP SERPL-MCNC: <5 UG/ML (ref 10–30)
AST SERPL-CCNC: 17 U/L (ref 15–37)
BACTERIA URNS QL MICRO: ABNORMAL /HPF
BARBITURATES UR QL SCN>200 NG/ML: ABNORMAL
BASOPHILS # BLD: 0 K/UL (ref 0–0.2)
BASOPHILS NFR BLD: 0.5 %
BENZODIAZ UR QL SCN>200 NG/ML: ABNORMAL
BILIRUB SERPL-MCNC: 0.5 MG/DL (ref 0–1)
BILIRUB UR QL STRIP.AUTO: NEGATIVE
BUN SERPL-MCNC: 11 MG/DL (ref 7–20)
CALCIUM SERPL-MCNC: 9.3 MG/DL (ref 8.3–10.6)
CANNABINOIDS UR QL SCN>50 NG/ML: ABNORMAL
CHLORIDE SERPL-SCNC: 103 MMOL/L (ref 99–110)
CLARITY UR: CLEAR
CO2 SERPL-SCNC: 25 MMOL/L (ref 21–32)
COCAINE UR QL SCN: ABNORMAL
COLOR UR: YELLOW
CREAT SERPL-MCNC: 0.6 MG/DL (ref 0.6–1.1)
DEPRECATED RDW RBC AUTO: 12.7 % (ref 12.4–15.4)
DRUG SCREEN COMMENT UR-IMP: ABNORMAL
EKG ATRIAL RATE: 62 BPM
EKG DIAGNOSIS: NORMAL
EKG P AXIS: 65 DEGREES
EKG P-R INTERVAL: 116 MS
EKG Q-T INTERVAL: 402 MS
EKG QRS DURATION: 112 MS
EKG QTC CALCULATION (BAZETT): 408 MS
EKG R AXIS: 79 DEGREES
EKG T AXIS: 57 DEGREES
EKG VENTRICULAR RATE: 62 BPM
EOSINOPHIL # BLD: 0.2 K/UL (ref 0–0.6)
EOSINOPHIL NFR BLD: 1.9 %
EPI CELLS #/AREA URNS HPF: ABNORMAL /HPF (ref 0–5)
ETHANOLAMINE SERPL-MCNC: NORMAL MG/DL (ref 0–0.08)
FENTANYL SCREEN, URINE: ABNORMAL
GFR SERPLBLD CREATININE-BSD FMLA CKD-EPI: >90 ML/MIN/{1.73_M2}
GLUCOSE SERPL-MCNC: 111 MG/DL (ref 70–99)
GLUCOSE UR STRIP.AUTO-MCNC: NEGATIVE MG/DL
HCG SERPL QL: NEGATIVE
HCT VFR BLD AUTO: 38.8 % (ref 36–48)
HGB BLD-MCNC: 13.6 G/DL (ref 12–16)
HGB UR QL STRIP.AUTO: ABNORMAL
KETONES UR STRIP.AUTO-MCNC: NEGATIVE MG/DL
LEUKOCYTE ESTERASE UR QL STRIP.AUTO: NEGATIVE
LYMPHOCYTES # BLD: 1.4 K/UL (ref 1–5.1)
LYMPHOCYTES NFR BLD: 16.6 %
MCH RBC QN AUTO: 31.1 PG (ref 26–34)
MCHC RBC AUTO-ENTMCNC: 35 G/DL (ref 31–36)
MCV RBC AUTO: 89 FL (ref 80–100)
METHADONE UR QL SCN>300 NG/ML: ABNORMAL
MONOCYTES # BLD: 0.5 K/UL (ref 0–1.3)
MONOCYTES NFR BLD: 6.5 %
MUCOUS THREADS #/AREA URNS LPF: ABNORMAL /LPF
NEUTROPHILS # BLD: 6.1 K/UL (ref 1.7–7.7)
NEUTROPHILS NFR BLD: 74.5 %
NITRITE UR QL STRIP.AUTO: NEGATIVE
OPIATES UR QL SCN>300 NG/ML: ABNORMAL
OXYCODONE UR QL SCN: ABNORMAL
PCP UR QL SCN>25 NG/ML: ABNORMAL
PH UR STRIP.AUTO: 6 [PH] (ref 5–8)
PH UR STRIP: 6 [PH]
PLATELET # BLD AUTO: 293 K/UL (ref 135–450)
PMV BLD AUTO: 7.2 FL (ref 5–10.5)
POTASSIUM SERPL-SCNC: 3.8 MMOL/L (ref 3.5–5.1)
PROT SERPL-MCNC: 7.4 G/DL (ref 6.4–8.2)
PROT UR STRIP.AUTO-MCNC: NEGATIVE MG/DL
RBC # BLD AUTO: 4.35 M/UL (ref 4–5.2)
RBC #/AREA URNS HPF: ABNORMAL /HPF (ref 0–4)
SALICYLATES SERPL-MCNC: 0.9 MG/DL (ref 15–30)
SARS-COV-2 RDRP RESP QL NAA+PROBE: NOT DETECTED
SODIUM SERPL-SCNC: 138 MMOL/L (ref 136–145)
SP GR UR STRIP.AUTO: >=1.03 (ref 1–1.03)
TSH SERPL DL<=0.005 MIU/L-ACNC: 1.41 UIU/ML (ref 0.27–4.2)
UA COMPLETE W REFLEX CULTURE PNL UR: ABNORMAL
UA DIPSTICK W REFLEX MICRO PNL UR: YES
URN SPEC COLLECT METH UR: ABNORMAL
UROBILINOGEN UR STRIP-ACNC: 0.2 E.U./DL
WBC # BLD AUTO: 8.1 K/UL (ref 4–11)
WBC #/AREA URNS HPF: ABNORMAL /HPF (ref 0–5)

## 2024-04-12 PROCEDURE — 6370000000 HC RX 637 (ALT 250 FOR IP): Performed by: PSYCHIATRY & NEUROLOGY

## 2024-04-12 PROCEDURE — 87635 SARS-COV-2 COVID-19 AMP PRB: CPT

## 2024-04-12 PROCEDURE — 82077 ASSAY SPEC XCP UR&BREATH IA: CPT

## 2024-04-12 PROCEDURE — 1240000000 HC EMOTIONAL WELLNESS R&B

## 2024-04-12 PROCEDURE — 80307 DRUG TEST PRSMV CHEM ANLYZR: CPT

## 2024-04-12 PROCEDURE — 93010 ELECTROCARDIOGRAM REPORT: CPT | Performed by: INTERNAL MEDICINE

## 2024-04-12 PROCEDURE — 99214 OFFICE O/P EST MOD 30 MIN: CPT | Performed by: INTERNAL MEDICINE

## 2024-04-12 PROCEDURE — 99285 EMERGENCY DEPT VISIT HI MDM: CPT

## 2024-04-12 PROCEDURE — 80053 COMPREHEN METABOLIC PANEL: CPT

## 2024-04-12 PROCEDURE — 93005 ELECTROCARDIOGRAM TRACING: CPT | Performed by: PSYCHIATRY & NEUROLOGY

## 2024-04-12 PROCEDURE — 36415 COLL VENOUS BLD VENIPUNCTURE: CPT

## 2024-04-12 PROCEDURE — 84703 CHORIONIC GONADOTROPIN ASSAY: CPT

## 2024-04-12 PROCEDURE — 85025 COMPLETE CBC W/AUTO DIFF WBC: CPT

## 2024-04-12 PROCEDURE — 81001 URINALYSIS AUTO W/SCOPE: CPT

## 2024-04-12 PROCEDURE — 80061 LIPID PANEL: CPT

## 2024-04-12 PROCEDURE — 84443 ASSAY THYROID STIM HORMONE: CPT

## 2024-04-12 PROCEDURE — 80179 DRUG ASSAY SALICYLATE: CPT

## 2024-04-12 PROCEDURE — 80143 DRUG ASSAY ACETAMINOPHEN: CPT

## 2024-04-12 PROCEDURE — 83036 HEMOGLOBIN GLYCOSYLATED A1C: CPT

## 2024-04-12 RX ORDER — HYDROXYZINE 50 MG/1
50 TABLET, FILM COATED ORAL EVERY 6 HOURS PRN
Status: DISCONTINUED | OUTPATIENT
Start: 2024-04-12 | End: 2024-04-13 | Stop reason: HOSPADM

## 2024-04-12 RX ORDER — NICOTINE 21 MG/24HR
1 PATCH, TRANSDERMAL 24 HOURS TRANSDERMAL DAILY
Status: DISCONTINUED | OUTPATIENT
Start: 2024-04-12 | End: 2024-04-13 | Stop reason: HOSPADM

## 2024-04-12 RX ORDER — ESCITALOPRAM OXALATE 10 MG/1
10 TABLET ORAL DAILY
Status: DISCONTINUED | OUTPATIENT
Start: 2024-04-12 | End: 2024-04-12

## 2024-04-12 RX ORDER — DEXTROAMPHETAMINE SACCHARATE, AMPHETAMINE ASPARTATE, DEXTROAMPHETAMINE SULFATE AND AMPHETAMINE SULFATE 2.5; 2.5; 2.5; 2.5 MG/1; MG/1; MG/1; MG/1
10 TABLET ORAL DAILY
Status: DISCONTINUED | OUTPATIENT
Start: 2024-04-13 | End: 2024-04-13 | Stop reason: HOSPADM

## 2024-04-12 RX ORDER — OLANZAPINE 10 MG/1
10 TABLET ORAL EVERY 4 HOURS PRN
Status: DISCONTINUED | OUTPATIENT
Start: 2024-04-12 | End: 2024-04-13 | Stop reason: HOSPADM

## 2024-04-12 RX ORDER — DEXTROAMPHETAMINE SACCHARATE, AMPHETAMINE ASPARTATE, DEXTROAMPHETAMINE SULFATE AND AMPHETAMINE SULFATE 2.5; 2.5; 2.5; 2.5 MG/1; MG/1; MG/1; MG/1
10 TABLET ORAL DAILY
Status: DISCONTINUED | OUTPATIENT
Start: 2024-04-12 | End: 2024-04-12

## 2024-04-12 RX ORDER — POLYETHYLENE GLYCOL 3350 17 G
2 POWDER IN PACKET (EA) ORAL
Status: DISCONTINUED | OUTPATIENT
Start: 2024-04-12 | End: 2024-04-13 | Stop reason: HOSPADM

## 2024-04-12 RX ORDER — MAGNESIUM HYDROXIDE/ALUMINUM HYDROXICE/SIMETHICONE 120; 1200; 1200 MG/30ML; MG/30ML; MG/30ML
30 SUSPENSION ORAL EVERY 6 HOURS PRN
Status: DISCONTINUED | OUTPATIENT
Start: 2024-04-12 | End: 2024-04-13 | Stop reason: HOSPADM

## 2024-04-12 RX ORDER — ESCITALOPRAM OXALATE 10 MG/1
10 TABLET ORAL DAILY
Status: DISCONTINUED | OUTPATIENT
Start: 2024-04-13 | End: 2024-04-13 | Stop reason: HOSPADM

## 2024-04-12 RX ORDER — ACETAMINOPHEN 325 MG/1
650 TABLET ORAL EVERY 4 HOURS PRN
Status: DISCONTINUED | OUTPATIENT
Start: 2024-04-12 | End: 2024-04-13 | Stop reason: HOSPADM

## 2024-04-12 RX ORDER — MONTELUKAST SODIUM 10 MG/1
10 TABLET ORAL NIGHTLY PRN
Status: DISCONTINUED | OUTPATIENT
Start: 2024-04-12 | End: 2024-04-13 | Stop reason: HOSPADM

## 2024-04-12 RX ORDER — IBUPROFEN 400 MG/1
400 TABLET ORAL EVERY 6 HOURS PRN
Status: DISCONTINUED | OUTPATIENT
Start: 2024-04-12 | End: 2024-04-13 | Stop reason: HOSPADM

## 2024-04-12 RX ORDER — DEXTROAMPHETAMINE SACCHARATE, AMPHETAMINE ASPARTATE MONOHYDRATE, DEXTROAMPHETAMINE SULFATE AND AMPHETAMINE SULFATE 2.5; 2.5; 2.5; 2.5 MG/1; MG/1; MG/1; MG/1
20 CAPSULE, EXTENDED RELEASE ORAL EVERY MORNING
Status: DISCONTINUED | OUTPATIENT
Start: 2024-04-13 | End: 2024-04-13 | Stop reason: HOSPADM

## 2024-04-12 RX ORDER — TRAZODONE HYDROCHLORIDE 50 MG/1
50 TABLET ORAL NIGHTLY PRN
Status: DISCONTINUED | OUTPATIENT
Start: 2024-04-12 | End: 2024-04-13 | Stop reason: HOSPADM

## 2024-04-12 RX ORDER — BUPROPION HYDROCHLORIDE 150 MG/1
150 TABLET ORAL EVERY MORNING
Status: DISCONTINUED | OUTPATIENT
Start: 2024-04-13 | End: 2024-04-13 | Stop reason: HOSPADM

## 2024-04-12 RX ORDER — BENZTROPINE MESYLATE 1 MG/ML
2 INJECTION INTRAMUSCULAR; INTRAVENOUS 2 TIMES DAILY PRN
Status: DISCONTINUED | OUTPATIENT
Start: 2024-04-12 | End: 2024-04-13 | Stop reason: HOSPADM

## 2024-04-12 RX ADMIN — TRAZODONE HYDROCHLORIDE 50 MG: 50 TABLET ORAL at 22:28

## 2024-04-12 ASSESSMENT — PATIENT HEALTH QUESTIONNAIRE - PHQ9
7. TROUBLE CONCENTRATING ON THINGS, SUCH AS READING THE NEWSPAPER OR WATCHING TELEVISION: SEVERAL DAYS
5. POOR APPETITE OR OVEREATING: SEVERAL DAYS
SUM OF ALL RESPONSES TO PHQ QUESTIONS 1-9: 9
7. TROUBLE CONCENTRATING ON THINGS, SUCH AS READING THE NEWSPAPER OR WATCHING TELEVISION: SEVERAL DAYS
4. FEELING TIRED OR HAVING LITTLE ENERGY: MORE THAN HALF THE DAYS
SUM OF ALL RESPONSES TO PHQ QUESTIONS 1-9: 9
SUM OF ALL RESPONSES TO PHQ9 QUESTIONS 1 & 2: 2
4. FEELING TIRED OR HAVING LITTLE ENERGY: MORE THAN HALF THE DAYS
SUM OF ALL RESPONSES TO PHQ QUESTIONS 1-9: 10
9. THOUGHTS THAT YOU WOULD BE BETTER OFF DEAD, OR OF HURTING YOURSELF: SEVERAL DAYS
1. LITTLE INTEREST OR PLEASURE IN DOING THINGS: SEVERAL DAYS
10. IF YOU CHECKED OFF ANY PROBLEMS, HOW DIFFICULT HAVE THESE PROBLEMS MADE IT FOR YOU TO DO YOUR WORK, TAKE CARE OF THINGS AT HOME, OR GET ALONG WITH OTHER PEOPLE: SOMEWHAT DIFFICULT
9. THOUGHTS THAT YOU WOULD BE BETTER OFF DEAD, OR OF HURTING YOURSELF: SEVERAL DAYS
SUM OF ALL RESPONSES TO PHQ QUESTIONS 1-9: 10
SUM OF ALL RESPONSES TO PHQ QUESTIONS 1-9: 10
8. MOVING OR SPEAKING SO SLOWLY THAT OTHER PEOPLE COULD HAVE NOTICED. OR THE OPPOSITE, BEING SO FIGETY OR RESTLESS THAT YOU HAVE BEEN MOVING AROUND A LOT MORE THAN USUAL: NOT AT ALL
10. IF YOU CHECKED OFF ANY PROBLEMS, HOW DIFFICULT HAVE THESE PROBLEMS MADE IT FOR YOU TO DO YOUR WORK, TAKE CARE OF THINGS AT HOME, OR GET ALONG WITH OTHER PEOPLE: VERY DIFFICULT
6. FEELING BAD ABOUT YOURSELF - OR THAT YOU ARE A FAILURE OR HAVE LET YOURSELF OR YOUR FAMILY DOWN: SEVERAL DAYS
5. POOR APPETITE OR OVEREATING: SEVERAL DAYS
SUM OF ALL RESPONSES TO PHQ QUESTIONS 1-9: 10
SUM OF ALL RESPONSES TO PHQ9 QUESTIONS 1 & 2: 2
3. TROUBLE FALLING OR STAYING ASLEEP: MORE THAN HALF THE DAYS
SUM OF ALL RESPONSES TO PHQ QUESTIONS 1-9: 10
1. LITTLE INTEREST OR PLEASURE IN DOING THINGS: SEVERAL DAYS
6. FEELING BAD ABOUT YOURSELF - OR THAT YOU ARE A FAILURE OR HAVE LET YOURSELF OR YOUR FAMILY DOWN: SEVERAL DAYS
2. FEELING DOWN, DEPRESSED OR HOPELESS: SEVERAL DAYS
3. TROUBLE FALLING OR STAYING ASLEEP: MORE THAN HALF THE DAYS
8. MOVING OR SPEAKING SO SLOWLY THAT OTHER PEOPLE COULD HAVE NOTICED. OR THE OPPOSITE, BEING SO FIGETY OR RESTLESS THAT YOU HAVE BEEN MOVING AROUND A LOT MORE THAN USUAL: NOT AT ALL
2. FEELING DOWN, DEPRESSED OR HOPELESS: SEVERAL DAYS
SUM OF ALL RESPONSES TO PHQ QUESTIONS 1-9: 10

## 2024-04-12 ASSESSMENT — SLEEP AND FATIGUE QUESTIONNAIRES
AVERAGE NUMBER OF SLEEP HOURS: 8
DO YOU HAVE DIFFICULTY SLEEPING: NO
DO YOU USE A SLEEP AID: NO

## 2024-04-12 ASSESSMENT — ANXIETY QUESTIONNAIRES
2. NOT BEING ABLE TO STOP OR CONTROL WORRYING: NOT AT ALL
GAD7 TOTAL SCORE: 3
4. TROUBLE RELAXING: SEVERAL DAYS
1. FEELING NERVOUS, ANXIOUS, OR ON EDGE: NOT AT ALL
5. BEING SO RESTLESS THAT IT IS HARD TO SIT STILL: NOT AT ALL
7. FEELING AFRAID AS IF SOMETHING AWFUL MIGHT HAPPEN: NOT AT ALL
3. WORRYING TOO MUCH ABOUT DIFFERENT THINGS: NOT AT ALL
6. BECOMING EASILY ANNOYED OR IRRITABLE: MORE THAN HALF THE DAYS
IF YOU CHECKED OFF ANY PROBLEMS ON THIS QUESTIONNAIRE, HOW DIFFICULT HAVE THESE PROBLEMS MADE IT FOR YOU TO DO YOUR WORK, TAKE CARE OF THINGS AT HOME, OR GET ALONG WITH OTHER PEOPLE: SOMEWHAT DIFFICULT

## 2024-04-12 ASSESSMENT — LIFESTYLE VARIABLES
HOW OFTEN DO YOU HAVE A DRINK CONTAINING ALCOHOL: MONTHLY OR LESS
HOW MANY STANDARD DRINKS CONTAINING ALCOHOL DO YOU HAVE ON A TYPICAL DAY: 1 OR 2
HOW MANY STANDARD DRINKS CONTAINING ALCOHOL DO YOU HAVE ON A TYPICAL DAY: 1 OR 2
HOW OFTEN DO YOU HAVE A DRINK CONTAINING ALCOHOL: 2-4 TIMES A MONTH

## 2024-04-12 NOTE — PROGRESS NOTES
CSSR-S Assessment completed with patient who then scored HIGH RISK.     Provider Dr. Paulino was notified of HIGH RISK score, via Telephone at 1740    Were suicide precautions ordered: YES, per protocol but Dc'd after assessed.    If not ordered, justification as follows: Patient denies current thoughts or intentions of SI and agrees to communicate with staff if that changes.    Completed by: Gilma BOWMAN RN

## 2024-04-12 NOTE — TELEPHONE ENCOUNTER
Patient's  is calling because patient has an appointment with  today 4/12 at 11:00 to discuss week-long depression and Lexapro not being effective.   is very concerned about her and I asked if she was having suicidal  ideation and he states he believes she may have had such thoughts.  He asked me to message you because he had been hoping to get an appointment with you today but he is now aware you are not here on Friday.  I told him I believe your protocol is if circumstances worsen to go to the ER to have addressed.  This is just an FYI-so you are aware.  Please call her  Jose Alfredo with any questions/concerns at number provided.

## 2024-04-12 NOTE — ED PROVIDER NOTES
extended release tablet Take 1 tablet by mouth every morning 90 tablet 3    escitalopram (LEXAPRO) 10 MG tablet Take 1 tablet by mouth daily 90 tablet 3    montelukast (SINGULAIR) 10 MG tablet Take 1 tablet by mouth nightly (Patient taking differently: Take 1 tablet by mouth as needed) 30 tablet 5     No Known Allergies    PHYSICAL EXAM  /63   Pulse 79   Temp 99 °F (37.2 °C) (Oral)   Resp 14   Ht 1.575 m (5' 2\")   Wt 54 kg (119 lb)   LMP 03/17/2024   SpO2 100%   BMI 21.77 kg/m²    GENERAL APPEARANCE: Awake and alert. Cooperative.  No acute distress.  HENT: Normocephalic. Atraumatic. Mucous membranes are moist.  No drooling or stridor.  NECK: Supple.    EYES: PERRL. EOM's intact.  HEART/CHEST: RRR. No murmurs.    LUNGS: Respirations unlabored. CTAB. Good air exchange. Speaking comfortably in full sentences.   ABDOMEN: No tenderness. Soft. Non-distended. No masses. No organomegaly. No guarding or rebound.   MUSCULOSKELETAL: No extremity edema. Compartments soft.  No deformity.  No tenderness in the extremities.  SKIN: Warm and dry. No acute rashes.   NEUROLOGICAL: Alert and oriented.  No facial droop.  No gross focal deficits  PSYCHIATRIC: Tearful.  Depressed mood.  Calm and cooperative with linear thought process.  Notes pressured speech.  Denies hallucinations.  Denies homicidal ideation.    LABS  I have reviewed all labs for this visit.   Results for orders placed or performed during the hospital encounter of 04/12/24   CBC with Auto Differential   Result Value Ref Range    WBC 8.1 4.0 - 11.0 K/uL    RBC 4.35 4.00 - 5.20 M/uL    Hemoglobin 13.6 12.0 - 16.0 g/dL    Hematocrit 38.8 36.0 - 48.0 %    MCV 89.0 80.0 - 100.0 fL    MCH 31.1 26.0 - 34.0 pg    MCHC 35.0 31.0 - 36.0 g/dL    RDW 12.7 12.4 - 15.4 %    Platelets 293 135 - 450 K/uL    MPV 7.2 5.0 - 10.5 fL    Neutrophils % 74.5 %    Lymphocytes % 16.6 %    Monocytes % 6.5 %    Eosinophils % 1.9 %    Basophils % 0.5 %    Neutrophils Absolute 6.1 1.7  - 7.7 K/uL    Lymphocytes Absolute 1.4 1.0 - 5.1 K/uL    Monocytes Absolute 0.5 0.0 - 1.3 K/uL    Eosinophils Absolute 0.2 0.0 - 0.6 K/uL    Basophils Absolute 0.0 0.0 - 0.2 K/uL   CMP w/ Reflex to MG   Result Value Ref Range    Sodium 138 136 - 145 mmol/L    Potassium reflex Magnesium 3.8 3.5 - 5.1 mmol/L    Chloride 103 99 - 110 mmol/L    CO2 25 21 - 32 mmol/L    Anion Gap 10 3 - 16    Glucose 111 (H) 70 - 99 mg/dL    BUN 11 7 - 20 mg/dL    Creatinine 0.6 0.6 - 1.1 mg/dL    Est, Glom Filt Rate >90 >60    Calcium 9.3 8.3 - 10.6 mg/dL    Total Protein 7.4 6.4 - 8.2 g/dL    Albumin 4.3 3.4 - 5.0 g/dL    Albumin/Globulin Ratio 1.4 1.1 - 2.2    Total Bilirubin 0.5 0.0 - 1.0 mg/dL    Alkaline Phosphatase 63 40 - 129 U/L    ALT 11 10 - 40 U/L    AST 17 15 - 37 U/L   Urinalysis with Reflex to Culture    Specimen: Urine   Result Value Ref Range    Urine Type Voided    HCG Qualitative, Serum   Result Value Ref Range    hCG Qual Negative Detects HCG level >10 MIU/mL   Acetaminophen Level   Result Value Ref Range    Acetaminophen Level <5 (L) 10 - 30 ug/mL   Salicylate   Result Value Ref Range    Salicylate, Serum 0.9 (L) 15.0 - 30.0 mg/dL   Ethanol   Result Value Ref Range    Ethanol Lvl None Detected mg/dL   Urine Drug Screen   Result Value Ref Range    Drug Screen Comment: see below        RADIOLOGY  None     Point of care ultrasound  No results found.     ED COURSE/MDM  I have performed a medical clearance examination on this patient.  It is my opinion that no medical conditions were discovered that would preclude admission to a behavioral health unit or discharge home.  I feel that the patient is medically stable for disposition by the behavioral health team at this time.    At this time, telepsych evaluation has been initiated, with final disposition per behavioral recommendations    Received call from the behavioral team and at this time they recommend admission for further behavioral evaluation and management.  Will

## 2024-04-12 NOTE — VIRTUAL HEALTH
Odalys Orozco  [id number]  1995     Social Work Behavioral Health Crisis Assessment    04/12/24    Chief Complaint: Suicidal ideation    HPI: Patient is a 28 y.o. White (non-) female who presents for SI and depression. Patient presented to the ED on 04/12/24 from home.  Recorded, Pt presents to the ED complaining of worsening depression with suicidal ideation with a plan to overdose on her medications.  She is on several medications including Wellbutrin, Lexapro and Adderall.  She states that she has been taking these as prescribed except for missing 1 dose on accident earlier this week.  She did recently take a vacation and had some alcohol while there as well as smoked some marijuana but does not use marijuana on a daily basis more frequently and no history of alcohol abuse or dependence.  No other drug use.  She denies any homicidal ideation or hallucinations.    Pt reported, \"I just was not having a good day, I was having a hard time getting out of bed, three to four weeks. So yesterday I did, I was going to take a bunch of pill. I just want it to be done.\"  My , \"I feel like he is worried, and don't know how to calm me in the process\".  Mathew Orozco (Spouse)  [phone] (Home Phone)  Attempted to contact with out answer.    Past Psychiatric History:  Previous Diagnoses/symptoms: Depression, probably 8 to 9 years, Anxiety I used to have really bad.   Previous suicide attempts/self-harm: \"  Inpatient psychiatric hospitalizations: no  Current outpatient psychiatric provider: I do have a Primary physician.   Current therapist: \"no\"  Previous psychiatric medication trials: No prior medication trials  Current psychiatric medications: I have wellburi, adderal, lexapro, I do there are moments when it does and then it doesn't. Winter of 2022, not great\"  Family Psychiatric History: \"not that I know of\".    Sleep Hours: 8    Sleep concerns:  \"I have a hard time getting up\".    Use of sleep

## 2024-04-12 NOTE — PROGRESS NOTES
amphetamine-dextroamphetamine (ADDERALL, 10MG,) 10 MG tablet Take 1 tablet by mouth daily for 90 days. take early afternoon Max Daily Amount: 10 mg 90 tablet 0    buPROPion (WELLBUTRIN XL) 150 MG extended release tablet Take 1 tablet by mouth every morning 90 tablet 3    escitalopram (LEXAPRO) 10 MG tablet Take 1 tablet by mouth daily 90 tablet 3    montelukast (SINGULAIR) 10 MG tablet Take 1 tablet by mouth nightly (Patient taking differently: Take 1 tablet by mouth as needed) 30 tablet 5       OBJECTIVE:  Vitals:    04/12/24 1104   BP: 100/70   Site: Right Upper Arm   Position: Sitting   Cuff Size: Medium Adult   Pulse: 84   SpO2: 100%   Weight: 53.9 kg (118 lb 12.8 oz)     Physical Exam  Cardiovascular:      Rate and Rhythm: Normal rate and regular rhythm.   Pulmonary:      Breath sounds: Normal breath sounds.   Neurological:      Mental Status: She is alert.   Psychiatric:         Attention and Perception: Attention normal.         Mood and Affect: Mood is depressed. Affect is tearful.         Speech: Speech normal.         Behavior: Behavior is slowed.         Thought Content: Thought content includes suicidal ideation. Thought content includes suicidal plan.         This note was generated completely or in part utilizing Dragon dictation speech recognition software.  Occasionally, words are mistranscribed and despite editing, the text may contain inaccuracies due to incorrect word recognition.  If further clarification is needed please contact the office at [phone]  --Darleen Go MD

## 2024-04-13 VITALS
TEMPERATURE: 97.9 F | DIASTOLIC BLOOD PRESSURE: 61 MMHG | HEIGHT: 62 IN | SYSTOLIC BLOOD PRESSURE: 88 MMHG | OXYGEN SATURATION: 96 % | RESPIRATION RATE: 16 BRPM | HEART RATE: 88 BPM | WEIGHT: 119 LBS | BODY MASS INDEX: 21.9 KG/M2

## 2024-04-13 PROBLEM — F32.A DEPRESSIVE DISORDER: Status: ACTIVE | Noted: 2024-04-13

## 2024-04-13 LAB
CHOLEST SERPL-MCNC: 185 MG/DL (ref 0–199)
HDLC SERPL-MCNC: 58 MG/DL (ref 40–60)
LDLC SERPL CALC-MCNC: 107 MG/DL
TRIGL SERPL-MCNC: 102 MG/DL (ref 0–150)
VLDLC SERPL CALC-MCNC: 20 MG/DL

## 2024-04-13 PROCEDURE — 99223 1ST HOSP IP/OBS HIGH 75: CPT | Performed by: PSYCHIATRY & NEUROLOGY

## 2024-04-13 PROCEDURE — 5130000000 HC BRIDGE APPOINTMENT

## 2024-04-13 PROCEDURE — G0378 HOSPITAL OBSERVATION PER HR: HCPCS

## 2024-04-13 PROCEDURE — 6370000000 HC RX 637 (ALT 250 FOR IP): Performed by: PSYCHIATRY & NEUROLOGY

## 2024-04-13 RX ORDER — BENZTROPINE MESYLATE 1 MG/ML
2 INJECTION INTRAMUSCULAR; INTRAVENOUS 2 TIMES DAILY PRN
Status: DISCONTINUED | OUTPATIENT
Start: 2024-04-13 | End: 2024-04-13 | Stop reason: SDUPTHER

## 2024-04-13 RX ORDER — ACETAMINOPHEN 325 MG/1
650 TABLET ORAL EVERY 4 HOURS PRN
Status: DISCONTINUED | OUTPATIENT
Start: 2024-04-13 | End: 2024-04-13 | Stop reason: SDUPTHER

## 2024-04-13 RX ORDER — OLANZAPINE 10 MG/1
10 TABLET ORAL EVERY 4 HOURS PRN
Status: DISCONTINUED | OUTPATIENT
Start: 2024-04-13 | End: 2024-04-13 | Stop reason: SDUPTHER

## 2024-04-13 RX ORDER — MAGNESIUM HYDROXIDE/ALUMINUM HYDROXICE/SIMETHICONE 120; 1200; 1200 MG/30ML; MG/30ML; MG/30ML
30 SUSPENSION ORAL EVERY 6 HOURS PRN
Status: DISCONTINUED | OUTPATIENT
Start: 2024-04-13 | End: 2024-04-13 | Stop reason: SDUPTHER

## 2024-04-13 RX ORDER — POLYETHYLENE GLYCOL 3350 17 G
2 POWDER IN PACKET (EA) ORAL
Status: DISCONTINUED | OUTPATIENT
Start: 2024-04-13 | End: 2024-04-13 | Stop reason: SDUPTHER

## 2024-04-13 RX ORDER — IBUPROFEN 400 MG/1
400 TABLET ORAL EVERY 6 HOURS PRN
Status: DISCONTINUED | OUTPATIENT
Start: 2024-04-13 | End: 2024-04-13 | Stop reason: SDUPTHER

## 2024-04-13 RX ORDER — NICOTINE 21 MG/24HR
1 PATCH, TRANSDERMAL 24 HOURS TRANSDERMAL DAILY
Status: DISCONTINUED | OUTPATIENT
Start: 2024-04-13 | End: 2024-04-13 | Stop reason: SDUPTHER

## 2024-04-13 RX ADMIN — BUPROPION HYDROCHLORIDE 150 MG: 150 TABLET, EXTENDED RELEASE ORAL at 09:02

## 2024-04-13 RX ADMIN — ESCITALOPRAM OXALATE 10 MG: 10 TABLET ORAL at 09:02

## 2024-04-13 RX ADMIN — DEXTROAMPHETAMINE SACCHARATE, AMPHETAMINE ASPARTATE MONOHYDRATE, DEXTROAMPHETAMINE SULFATE AND AMPHETAMINE SULFATE 20 MG: 2.5; 2.5; 2.5; 2.5 CAPSULE, EXTENDED RELEASE ORAL at 08:59

## 2024-04-13 ASSESSMENT — LIFESTYLE VARIABLES
HOW OFTEN DO YOU HAVE A DRINK CONTAINING ALCOHOL: 2-4 TIMES A MONTH
HOW MANY STANDARD DRINKS CONTAINING ALCOHOL DO YOU HAVE ON A TYPICAL DAY: 1 OR 2

## 2024-04-13 ASSESSMENT — PATIENT HEALTH QUESTIONNAIRE - PHQ9
1. LITTLE INTEREST OR PLEASURE IN DOING THINGS: SEVERAL DAYS
SUM OF ALL RESPONSES TO PHQ9 QUESTIONS 1 & 2: 2
6. FEELING BAD ABOUT YOURSELF - OR THAT YOU ARE A FAILURE OR HAVE LET YOURSELF OR YOUR FAMILY DOWN: SEVERAL DAYS
10. IF YOU CHECKED OFF ANY PROBLEMS, HOW DIFFICULT HAVE THESE PROBLEMS MADE IT FOR YOU TO DO YOUR WORK, TAKE CARE OF THINGS AT HOME, OR GET ALONG WITH OTHER PEOPLE: SOMEWHAT DIFFICULT
SUM OF ALL RESPONSES TO PHQ QUESTIONS 1-9: 8
7. TROUBLE CONCENTRATING ON THINGS, SUCH AS READING THE NEWSPAPER OR WATCHING TELEVISION: SEVERAL DAYS
9. THOUGHTS THAT YOU WOULD BE BETTER OFF DEAD, OR OF HURTING YOURSELF: SEVERAL DAYS
SUM OF ALL RESPONSES TO PHQ QUESTIONS 1-9: 8
2. FEELING DOWN, DEPRESSED OR HOPELESS: SEVERAL DAYS
5. POOR APPETITE OR OVEREATING: SEVERAL DAYS
3. TROUBLE FALLING OR STAYING ASLEEP: SEVERAL DAYS
SUM OF ALL RESPONSES TO PHQ QUESTIONS 1-9: 8
SUM OF ALL RESPONSES TO PHQ QUESTIONS 1-9: 7
8. MOVING OR SPEAKING SO SLOWLY THAT OTHER PEOPLE COULD HAVE NOTICED. OR THE OPPOSITE, BEING SO FIGETY OR RESTLESS THAT YOU HAVE BEEN MOVING AROUND A LOT MORE THAN USUAL: NOT AT ALL
4. FEELING TIRED OR HAVING LITTLE ENERGY: SEVERAL DAYS

## 2024-04-13 ASSESSMENT — SLEEP AND FATIGUE QUESTIONNAIRES
AVERAGE NUMBER OF SLEEP HOURS: 8
DO YOU USE A SLEEP AID: NO
DO YOU HAVE DIFFICULTY SLEEPING: NO

## 2024-04-13 NOTE — PROGRESS NOTES
Behavioral Services  Medicare Certification Upon Admission    I certify that this patient's inpatient psychiatric hospital admission is medically necessary for:    [x] (1) Treatment which could reasonably be expected to improve this patient's condition,       [x] (2) Or for diagnostic study;     AND     [x](2) The inpatient psychiatric services are provided while the individual is under the care of a physician and are included in the individualized plan of care.    Estimated length of stay/service 1 d    Plan for post-hospital care outpt    Electronically signed by MANISHA SNEED MD on 4/13/2024 at 8:24 AM

## 2024-04-13 NOTE — PROGRESS NOTES
Patient visible on the unit, sitting at a table reading a book and eating chips towards beginning of shift. Unit received call from patient's mother in law, who stated that she and the patient's  wanted to sign the patient out AMA, became belligerent and rude with staff when told that that was not possible. Nurse talked to patient about the situation, who did not know that her family was going to call the unit. Patient became very tearful. Stated that she did not wish to be here, that her mother in law was the person that made the appointment with her PCP that ended up with her being admitted here. States that her MIL is/was a nurse practitioner and feels that she has final say on \"all things medical\". Took patient out onto unit to talk further. Patient crying, saying that she was more upset at the nurses speaking about the situation at the station \"as though I'm not a person\". Comforted patient and asked about the events that led up to her admission. Patient stated that \"yesterday wasn't a good day and there was a scare but today wasn't as bad\". Patient's  called unit back and patient spoke on the phone with him, telling him that he and MIL were not helping the situation and that she would not be able to leave until the doctor discharged her, that she appreciated what they were trying to do but to stop. Patient expressed to nurse that she was having trouble sleeping, PRN Trazodone 50 mg PO given after pt got off the phone with . Denies all else at this time. Patient returned to bedroom shortly after and is resting in bed quietly.

## 2024-04-13 NOTE — PLAN OF CARE
Caller: Matthew Madrid    Relationship: Self    Best call back number: [phone]    What is the best time to reach you: ANYTIME     Who are you requesting to speak with (clinical staff, provider,  specific staff member): CANDIDA     What was the call regarding: STATES THIS WAS ABOUT HIS MEDI CARD, REQUESTING TO SPEAK WITH CANDIDA.     Is it okay if the provider responds through MyChart: CALL       Patient is calm, coopertive, Denies SI, AVH, HI at this time. Patient stats she is ready to be discharged. States I feel \"good, Im ready to go home\". No further questions, comments or concerns from patient at this time.     Problem: Self Harm/Suicidality  Goal: Will have no self-injury during hospital stay  Description: INTERVENTIONS:  1.  Ensure constant observer at bedside with Q15M safety checks  2.  Maintain a safe environment  3.  Secure patient belongings  4.  Ensure family/visitors adhere to safety recommendations  5.  Ensure safety tray has been added to patient's diet order  6.  Every shift and PRN: Re-assess suicidal risk via Frequent Screener    4/13/2024 0931 by Gayathri Treviño RN  Outcome: Progressing  4/13/2024 0107 by Guillermina Larson RN  Outcome: Progressing  Flowsheets (Taken 4/12/2024 2356)  Will have no self-injury during hospital stay: Maintain a safe environment     Problem: Anxiety  Goal: Will report anxiety at manageable levels  Description: INTERVENTIONS:  1. Administer medication as ordered  2. Teach and rehearse alternative coping skills  3. Provide emotional support with 1:1 interaction with staff  4/13/2024 0931 by Gayathri Treviño RN  Outcome: Progressing  Flowsheets (Taken 4/13/2024 0925)  Will report anxiety at manageable levels: Teach and rehearse alternative coping skills  4/13/2024 0107 by Guillermina Larson, RN  Outcome: Progressing  Flowsheets (Taken 4/12/2024 2356)  Will report anxiety at manageable levels:   Administer medication as ordered   Provide emotional support with 1:1 interaction with staff     Problem: Coping  Goal: Pt/Family able to verbalize concerns and demonstrate effective coping strategies  Description: INTERVENTIONS:  1. Assist patient/family to identify coping skills, available support systems and cultural and spiritual values  2. Provide emotional support, including active listening and acknowledgement of concerns of patient and caregivers  3. Reduce  self harm: Assess impact of patient’s symptoms on level of functioning, self care needs and offer support as indicated  Taken 4/13/2024 0925  Effect of psychiatric condition will be minimized and patient will be protected from self harm: Assess impact of patient’s symptoms on level of functioning, self care needs and offer support as indicated  4/13/2024 0107 by Guillermina Larson, RN  Outcome: Not Progressing  Flowsheets (Taken 4/12/2024 0181)  Effect of psychiatric condition will be minimized and patient will be protected from self harm:   Assess impact of patient’s symptoms on level of functioning, self care needs and offer support as indicated   Assess patient/family knowledge of depression, impact on illness and need for teaching   Provide emotional support, presence and reassurance   Assess for suicidal thoughts or ideation. If patient expresses suicidal thoughts or statements do not leave alone, initiate Suicide Precautions, move near nurse station, obtain sitter

## 2024-04-13 NOTE — CARE COORDINATION
04/13/24 0827   Psychiatric History   Psychiatric history treatment   (Depression, no thougths of hurting self. Reports nothing sparked the depression. PCP thought she needed to go to the hospital)   Contact information Dr. Marquez Howell had an an appt. wants therapy through the PCP. Wants to set up appt with dr son psych   Are there any medication issues? No   Recent Psychological Experiences   (Reports nothings has sparked this depression)   Support System   Support system Adequate   Types of Support System Spouse;Friend   Problems in support system Isolated   Current Living Situation   Home Living Adequate   Living information Lives with others  ()   Problems with living situation  No   Lack of basic needs No   SSDI/SSI NA   Other government assistance NA   Problems with environment NA   Current abuse issues NA   Relationship problems No   Contact information NA   Medical and Self-Care Issues   Relevant medical problems Depression, ADD   Relevant self-care issues NA   Barriers to treatment No   Family Constellation   Spouse/partner-name/age Jose Alfredo   Children-names/ages NA   Parents Kayce and Nick does have contact   Siblings 1 brother 1 sister. Does have relationship   Contact information NA   Support services   (NA)   Childhood   Raised by Biological mother;Biological father   Biological mother Good upbringing   Biological father Good upbringing   Relevant family history NA   History of abuse No   Legal History   Legal history No   Juvenile legal history No   Abuse Assessment   Physical Abuse Denies   Verbal Abuse Denies   Emotional abuse Denies   Financial Abuse Denies   Sexual abuse Denies   Possible abuse reported to None needed   Substance Use   Use of substances  No   Motivation for SA Treatment   Motivation for treatment No   Education   Education College graduate   Work History   Currently employed Yes  (self emplyed)    service   (NA)   Cultural and Spiritual   Spiritual concerns No

## 2024-04-13 NOTE — PROGRESS NOTES
Bridge Appointment completed: Reviewed Discharge Instructions with patient.    Patient verbalizes understanding and agreement with the discharge plan using the teachback method.     Referral for Outpatient Tobacco Cessation Counseling, upon discharge (indu X if applicable and completed):    ( )  Hospital staff assisted patient to call Quit Line or faxed referral                                   during hospitalization                  ( )  Recognizing danger situations (included triggers and roadblocks), if not completed on admission                    ( )  Coping skills (new ways to manage stress, exercise, relaxation techniques, changing routine, distraction), if not completed on admission                                                           ( )  Basic information about quitting (benefits of quitting, techniques in how to quit, available resources, if not completed on admission  ( ) [facility]   ( ) Patient refused referral  ( X) Patient refused counseling  ( ) Patient refused smoking cessation medication upon discharge    Vaccinations (indu X if applicable and completed):  ( ) Patient states already received influenza vaccine elsewhere  ( ) Patient received influenza vaccine during this hospitalization  ( ) Patient refused influenza vaccine at this time  ( X) Not offered

## 2024-04-13 NOTE — PROGRESS NOTES
[facility] [place]  Discharge Note    Pt discharged with followings belongings:   Dental Appliances: None  Vision - Corrective Lenses: Eyeglasses, At home  Hearing Aid: None  Jewelry: Other (Comment), Ring, Bracelet (bracelet in locker, 7 rings on fingers)  Body Piercings Removed: N/A  Clothing: Belt, Pants, Shirt, Sweater  Other Valuables: Other (Comment) (none)   Valuables sent home with Patient  or returned to patient. Patient educated on aftercare instructions: Yes  at 10:12 AM .Patient verbalize understanding of AVS:  Yes.    Status EXAM upon discharge:  Mental Status and Behavioral Exam  Normal: No  Level of Assistance: Independent/Self  Facial Expression: Sad  Affect: Appropriate  Level of Consciousness: Alert  Frequency of Checks: 4 times per hour, close  Mood:Normal: No  Mood: Anxious  Motor Activity:Normal: No  Motor Activity: Decreased  Eye Contact: Good  Observed Behavior: Cooperative  Sexual Misconduct History: Current - no  Preception: [place] to person, [place] to time, [place] to place, [place] to situation  Attention:Normal: Yes  Attention: Unable to concentrate  Thought Processes: Unremarkable  Thought Content:Normal: Yes  Thought Content:  (NA)  Depression Symptoms: Isolative  Anxiety Symptoms: Generalized  Shila Symptoms: No problems reported or observed.  Hallucinations: None  Delusions: No  Memory:Normal: Yes  Insight and Judgment: No  Insight and Judgment: Poor judgment, Poor insight    Tobacco Screening:  Practical Counseling, on admission, indu X, if applicable and completed (first 3 are required if patient doesn't refuse):            ( ) Recognizing danger situations (included triggers and roadblocks)                    ( ) Coping skills (new ways to manage stress,relaxation techniques, changing routine, distraction)                                                           ( ) Basic information about quitting (benefits of quitting, techniques in how to quit, available resources  ( )  Referral for counseling faxed to [facility]                                                                                                                   ( ) Patient refused counseling  ( ) Patient refused referral  ( ) Patient refused prescription upon discharge  ( X) Patient has not smoked in the last 30 days    Metabolic Screening:    Lab Results   Component Value Date    LABA1C 4.3 02/24/2021       Lab Results   Component Value Date    CHOL 177 07/21/2023    CHOL 196 02/24/2021     Lab Results   Component Value Date    TRIG 61 07/21/2023    TRIG 101 02/24/2021     Lab Results   Component Value Date    HDL 70 (H) 07/21/2023    HDL 58 02/24/2021     No components found for: \"LDLCAL\"  No components found for: \"LABVLDL\"    Gayathri Treviño RN

## 2024-04-13 NOTE — PLAN OF CARE
Problem: Self Harm/Suicidality  Goal: Will have no self-injury during hospital stay  Description: INTERVENTIONS:  1.  Ensure constant observer at bedside with Q15M safety checks  2.  Maintain a safe environment  3.  Secure patient belongings  4.  Ensure family/visitors adhere to safety recommendations  5.  Ensure safety tray has been added to patient's diet order  6.  Every shift and PRN: Re-assess suicidal risk via Frequent Screener    4/13/2024 0107 by Guillermina Larson RN  Outcome: Progressing  Flowsheets (Taken 4/12/2024 2356)  Will have no self-injury during hospital stay: Maintain a safe environment     Problem: Anxiety  Goal: Will report anxiety at manageable levels  Description: INTERVENTIONS:  1. Administer medication as ordered  2. Teach and rehearse alternative coping skills  3. Provide emotional support with 1:1 interaction with staff  Outcome: Progressing  Flowsheets (Taken 4/12/2024 2356)  Will report anxiety at manageable levels:   Administer medication as ordered   Provide emotional support with 1:1 interaction with staff     Problem: Coping  Goal: Pt/Family able to verbalize concerns and demonstrate effective coping strategies  Description: INTERVENTIONS:  1. Assist patient/family to identify coping skills, available support systems and cultural and spiritual values  2. Provide emotional support, including active listening and acknowledgement of concerns of patient and caregivers  3. Reduce environmental stimuli, as able  4. Instruct patient/family in relaxation techniques, as appropriate  5. Assess for spiritual pain/suffering and initiate Spiritual Care, Psychosocial Clinical Specialist consults as needed  Outcome: Progressing  Flowsheets (Taken 4/12/2024 2356)  Patient/family able to verbalize anxieties, fears, and concerns, and demonstrate effective coping:   Provide emotional support, including active listening and acknowledgement of concerns of patient and caregivers   Instruct  patient/family in relaxation techniques, as appropriate

## 2024-04-13 NOTE — TRANSITION OF CARE
Behavioral Health Transition Record to Provider    Patient Name: Odalys Orozco  YOB: 1995   Medical Record Number: [id number]  Date of Admission: 4/12/2024 12:37 PM   Date of Discharge: 4/13/2024    Attending Provider: Mariano Paulino MD   Discharging Provider: Lora PONCE   To contact this individual call [phone] and ask the  to page.  If unavailable, ask to be transferred to Behavioral Health Provider on call.  A Behavioral Health Provider will be available on call 24/7 and during holidays.    Primary Care Provider: Darleen Go MD    No Known Allergies    Reason for Admission: \"Patient here with depression.  Chronic problem, waxing and waning over the years. In last several weeks, symptoms worsening.  No trigger. Irritable, sad,  and in last few days  having hard time getting her out of bed.  Sleeping too much, no appetite.\"     Admission Diagnosis: Mood disorder (HCC) [F39]  Major depression, recurrent (HCC) [F33.9]  Major depressive disorder, recurrent, unspecified (HCC) [F33.9]  Depressive disorder [F32.A]    * No surgery found *    Results for orders placed or performed during the hospital encounter of 04/12/24   COVID-19, Rapid    Specimen: Nasopharyngeal Swab   Result Value Ref Range    SARS-CoV-2, NAAT Not Detected Not Detected   CBC with Auto Differential   Result Value Ref Range    WBC 8.1 4.0 - 11.0 K/uL    RBC 4.35 4.00 - 5.20 M/uL    Hemoglobin 13.6 12.0 - 16.0 g/dL    Hematocrit 38.8 36.0 - 48.0 %    MCV 89.0 80.0 - 100.0 fL    MCH 31.1 26.0 - 34.0 pg    MCHC 35.0 31.0 - 36.0 g/dL    RDW 12.7 12.4 - 15.4 %    Platelets 293 135 - 450 K/uL    MPV 7.2 5.0 - 10.5 fL    Neutrophils % 74.5 %    Lymphocytes % 16.6 %    Monocytes % 6.5 %    Eosinophils % 1.9 %    Basophils % 0.5 %    Neutrophils Absolute 6.1 1.7 - 7.7 K/uL    Lymphocytes Absolute 1.4 1.0 - 5.1 K/uL    Monocytes Absolute 0.5 0.0 - 1.3 K/uL    Eosinophils Absolute 0.2 0.0 - 0.6 K/uL    Basophils Absolute

## 2024-04-14 LAB
EST. AVERAGE GLUCOSE BLD GHB EST-MCNC: 82.5 MG/DL
HBA1C MFR BLD: 4.5 %

## 2024-04-15 ENCOUNTER — OFFICE VISIT (OUTPATIENT)
Dept: PSYCHIATRY | Age: 29
End: 2024-04-15
Payer: COMMERCIAL

## 2024-04-15 VITALS
WEIGHT: 118 LBS | BODY MASS INDEX: 21.71 KG/M2 | OXYGEN SATURATION: 98 % | HEART RATE: 81 BPM | DIASTOLIC BLOOD PRESSURE: 54 MMHG | SYSTOLIC BLOOD PRESSURE: 90 MMHG | HEIGHT: 62 IN

## 2024-04-15 DIAGNOSIS — F33.2 MAJOR DEPRESSIVE DISORDER, RECURRENT SEVERE WITHOUT PSYCHOTIC FEATURES (HCC): Primary | ICD-10-CM

## 2024-04-15 DIAGNOSIS — F32.81 PMDD (PREMENSTRUAL DYSPHORIC DISORDER): ICD-10-CM

## 2024-04-15 DIAGNOSIS — F90.2 ATTENTION DEFICIT HYPERACTIVITY DISORDER (ADHD), COMBINED TYPE: ICD-10-CM

## 2024-04-15 DIAGNOSIS — F40.10 SOCIAL ANXIETY DISORDER: ICD-10-CM

## 2024-04-15 PROCEDURE — 99214 OFFICE O/P EST MOD 30 MIN: CPT | Performed by: STUDENT IN AN ORGANIZED HEALTH CARE EDUCATION/TRAINING PROGRAM

## 2024-04-15 PROCEDURE — 90833 PSYTX W PT W E/M 30 MIN: CPT | Performed by: STUDENT IN AN ORGANIZED HEALTH CARE EDUCATION/TRAINING PROGRAM

## 2024-04-15 RX ORDER — ESCITALOPRAM OXALATE 20 MG/1
20 TABLET ORAL DAILY
Qty: 30 TABLET | Refills: 2 | Status: SHIPPED | OUTPATIENT
Start: 2024-04-15

## 2024-04-15 ASSESSMENT — PATIENT HEALTH QUESTIONNAIRE - PHQ9
SUM OF ALL RESPONSES TO PHQ QUESTIONS 1-9: 13
3. TROUBLE FALLING OR STAYING ASLEEP: MORE THAN HALF THE DAYS
8. MOVING OR SPEAKING SO SLOWLY THAT OTHER PEOPLE COULD HAVE NOTICED. OR THE OPPOSITE, BEING SO FIGETY OR RESTLESS THAT YOU HAVE BEEN MOVING AROUND A LOT MORE THAN USUAL: SEVERAL DAYS
6. FEELING BAD ABOUT YOURSELF - OR THAT YOU ARE A FAILURE OR HAVE LET YOURSELF OR YOUR FAMILY DOWN: MORE THAN HALF THE DAYS
10. IF YOU CHECKED OFF ANY PROBLEMS, HOW DIFFICULT HAVE THESE PROBLEMS MADE IT FOR YOU TO DO YOUR WORK, TAKE CARE OF THINGS AT HOME, OR GET ALONG WITH OTHER PEOPLE: VERY DIFFICULT
2. FEELING DOWN, DEPRESSED OR HOPELESS: MORE THAN HALF THE DAYS
SUM OF ALL RESPONSES TO PHQ9 QUESTIONS 1 & 2: 4
1. LITTLE INTEREST OR PLEASURE IN DOING THINGS: MORE THAN HALF THE DAYS
9. THOUGHTS THAT YOU WOULD BE BETTER OFF DEAD, OR OF HURTING YOURSELF: MORE THAN HALF THE DAYS
SUM OF ALL RESPONSES TO PHQ QUESTIONS 1-9: 15
7. TROUBLE CONCENTRATING ON THINGS, SUCH AS READING THE NEWSPAPER OR WATCHING TELEVISION: SEVERAL DAYS
5. POOR APPETITE OR OVEREATING: SEVERAL DAYS
4. FEELING TIRED OR HAVING LITTLE ENERGY: MORE THAN HALF THE DAYS

## 2024-04-15 ASSESSMENT — ANXIETY QUESTIONNAIRES
IF YOU CHECKED OFF ANY PROBLEMS ON THIS QUESTIONNAIRE, HOW DIFFICULT HAVE THESE PROBLEMS MADE IT FOR YOU TO DO YOUR WORK, TAKE CARE OF THINGS AT HOME, OR GET ALONG WITH OTHER PEOPLE: SOMEWHAT DIFFICULT
2. NOT BEING ABLE TO STOP OR CONTROL WORRYING: SEVERAL DAYS
3. WORRYING TOO MUCH ABOUT DIFFERENT THINGS: SEVERAL DAYS
1. FEELING NERVOUS, ANXIOUS, OR ON EDGE: SEVERAL DAYS
7. FEELING AFRAID AS IF SOMETHING AWFUL MIGHT HAPPEN: NOT AT ALL
5. BEING SO RESTLESS THAT IT IS HARD TO SIT STILL: SEVERAL DAYS
6. BECOMING EASILY ANNOYED OR IRRITABLE: NEARLY EVERY DAY
GAD7 TOTAL SCORE: 7
4. TROUBLE RELAXING: NOT AT ALL

## 2024-04-15 ASSESSMENT — ENCOUNTER SYMPTOMS
ALLERGIC/IMMUNOLOGIC NEGATIVE: 1
RESPIRATORY NEGATIVE: 1
EYES NEGATIVE: 1
GASTROINTESTINAL NEGATIVE: 1

## 2024-04-15 NOTE — PROGRESS NOTES
PSYCHIATRY PROGRESS NOTE    Odalys Orozco  1995  04/15/2024  Face to Face time: 45 minutes, of which 20 minutes were spent in supportive psychotherapy  PCP: Darleen Go MD    CC:   Chief Complaint   Patient presents with    Follow-up       Patient is a 28 y.o. female without significant past medical history presents the outpatient psychiatric clinic today for evaluation management of depression and anxiety.    A:  Patient's presentation today is indicative of an acute worsening of her underlying depression that does appear to have managed with a possible diagnosis of PMDD.  The patient's view of what happened last week is still unstable and in fluctuation, leading to some moods that put her at higher risk than previous.  We will attempt to adjust her medication regimen to provide her with further support.    Diagnosis:  ADHD combined type  MDD-R severe with PMDD  Social Anxiety Disorder    P:   1.  Increase escitalopram up to 20 mg daily.  Patient was cautioned regarding adverse effects of this medication as have been described to her previously.  2.  Continue current medications of bupropion  mg daily, Adderall ER 25 mg daily, and Adderall IR 10 mg every afternoon to supplement    Medication Monitoring:    - PDMP reviewed: Adderall IR 10 mg daily 90-day supply last filled 2/16/2024.  Adderall ER 25 mg daily 90-day supply last filled 2/15/2024.    Follow-up: 2 weeks    Safety: Pt was counseled on the potential for increased suicidal ideations and advised on potential options for dealing with these including hotlines, calling the office, or going to the nearest emergency room.      __________________________________________________________________________    S:   Patient endorsed that she was not doing that well at the moment.  She noted that it was not a great week last week, however things have been building for the last 3 to 4 weeks.  She ended up arriving back from a trip to see relatives in [facility]

## 2024-04-15 NOTE — H&P
[address] [address]                         OBSERVATION ADMIT/ DC SUMMARY                                    HISTORY & PHYSICAL      PATIENT NAME: RADHA PIERCE               : 1995  MED REC NO: [id number]                      ROOM: [id number]  ACCOUNT NO: [id number]                       ADMIT DATE: 2024  PROVIDER: Mariano Paulino MD      This is an observation admission and discharge summary.    CONDITION ON DISCHARGE:  Stable.    DISPOSITION:  The patient to be discharged home.  Follow up in Outpatient Services through [facility].    DISCHARGE DIAGNOSES:  Axis I:  Major depressive episode, recurrent, nonpsychotic, severe.  Axis II:  Deferred.    Axis III:  Unremarkable.  Axis IV:  Moderate.  Axis:  60.    DISCHARGE MEDICATIONS:  Wellbutrin  mg daily, Lexapro 10 mg daily; Adderall XR 20 mg morning, 10 mg in the afternoon.    LABORATORY DATA:  Laboratories reviewed.  Urine drug screen was positive for amphetamine, but is prescribed Adderall.  Alcohol, none detected.    VITAL SIGNS:  Temperature 97.9, pulse 80, respirations 16, blood pressure 88/61.  She is 5 feet 2 inches tall and 119 pounds.    CHIEF COMPLAINT:  Depression and suicidality.    HISTORY OF PRESENT ILLNESS:  The patient is a 28-year-old female, who presented to the ED at [facility] on 2024 with suicidal ideation and depression.  She came from home.  She stated that 2 days ago she was \"having a bad day\" and had been traveling recently and was sleeping a lot and had slept from 3 p.m. to 11 a.m. the next day and woke up irritable and got into an argument with her .  She stated that she felt overwhelmed and went into the bathroom and impulsively found a bottle of Wellbutrin that was old and she had thoughts of taking that.  She did not realize her  was standing near her, and he grabbed the

## 2024-04-24 NOTE — PROGRESS NOTES
None Detected  mg/dL Final    Comment:    None Detected  Conversion factor:  100 mg/dl = .100 g/dl  For Medical Purposes Only      Amphetamine Screen, Urine 04/12/2024 POSITIVE (A)  Negative <1000ng/mL Final    Comment: High concentrations of ephedrine/pseudoephedrine or  phenylpropanolamine may cause false positive results  for amphetamine. Therefore, confirmatory testing for  amphetamine should be considered if clinically indicated.      Barbiturate Screen, Ur 04/12/2024 Neg  Negative <200 ng/mL Final    Benzodiazepine Screen, Urine 04/12/2024 Neg  Negative <200 ng/mL Final    Cannabinoid Scrn, Ur 04/12/2024 Neg  Negative <50 ng/mL Final    Cocaine Metabolite Screen, Urine 04/12/2024 Neg  Negative <300 ng/mL Final    Opiate Scrn, Ur 04/12/2024 Neg  Negative <300 ng/mL Final    Comment: \"Therapeutic levels of pain medication, especially oxycontin and synthetic  opioids, may not be detected by this Methodology. Pain management screen  panel  Drug panel-PM-Hi Res Ur, Interp (PAIN) should be considered for drug  monitoring \".      PCP Screen, Urine 04/12/2024 Neg  Negative <25 ng/mL Final    Methadone Screen, Urine 04/12/2024 Neg  Negative <300 ng/mL Final    Oxycodone Urine 04/12/2024 Neg  Negative <100 ng/ml Final    FENTANYL SCREEN, URINE 04/12/2024 Neg  Negative <5 ng/mL Final    pH, UA 04/12/2024 6.0   Final    Comment: Urine pH less than 5.0 or greater than 8.0 may indicate sample adulteration.  Another sample should be collected if clinically  indicated.      Drug Screen Comment: 04/12/2024 see below   Final    Comment: This method is a screening test to detect only these drug  classes as part of a medical workup.  Confirmatory testing  by another method should be ordered if clinically indicated.      Mucus, UA 04/12/2024 1+ (A)  None Seen /LPF Final    WBC, UA 04/12/2024 0-2  0 - 5 /HPF Final    RBC, UA 04/12/2024 0-2  0 - 4 /HPF Final    Epithelial Cells, UA 04/12/2024 2-5  0 - 5 /HPF Final    Bacteria, UA

## 2024-04-29 ENCOUNTER — OFFICE VISIT (OUTPATIENT)
Dept: PSYCHIATRY | Age: 29
End: 2024-04-29
Payer: COMMERCIAL

## 2024-04-29 VITALS
DIASTOLIC BLOOD PRESSURE: 52 MMHG | WEIGHT: 118.2 LBS | SYSTOLIC BLOOD PRESSURE: 90 MMHG | BODY MASS INDEX: 21.75 KG/M2 | HEIGHT: 62 IN

## 2024-04-29 DIAGNOSIS — F32.81 PMDD (PREMENSTRUAL DYSPHORIC DISORDER): ICD-10-CM

## 2024-04-29 DIAGNOSIS — F33.2 MAJOR DEPRESSIVE DISORDER, RECURRENT SEVERE WITHOUT PSYCHOTIC FEATURES (HCC): Primary | ICD-10-CM

## 2024-04-29 DIAGNOSIS — F40.10 SOCIAL ANXIETY DISORDER: ICD-10-CM

## 2024-04-29 DIAGNOSIS — F90.2 ATTENTION DEFICIT HYPERACTIVITY DISORDER (ADHD), COMBINED TYPE: ICD-10-CM

## 2024-04-29 PROCEDURE — 99214 OFFICE O/P EST MOD 30 MIN: CPT | Performed by: STUDENT IN AN ORGANIZED HEALTH CARE EDUCATION/TRAINING PROGRAM

## 2024-04-29 ASSESSMENT — PATIENT HEALTH QUESTIONNAIRE - PHQ9
SUM OF ALL RESPONSES TO PHQ9 QUESTIONS 1 & 2: 2
3. TROUBLE FALLING OR STAYING ASLEEP: MORE THAN HALF THE DAYS
7. TROUBLE CONCENTRATING ON THINGS, SUCH AS READING THE NEWSPAPER OR WATCHING TELEVISION: NOT AT ALL
SUM OF ALL RESPONSES TO PHQ QUESTIONS 1-9: 9
9. THOUGHTS THAT YOU WOULD BE BETTER OFF DEAD, OR OF HURTING YOURSELF: NOT AT ALL
SUM OF ALL RESPONSES TO PHQ QUESTIONS 1-9: 9
10. IF YOU CHECKED OFF ANY PROBLEMS, HOW DIFFICULT HAVE THESE PROBLEMS MADE IT FOR YOU TO DO YOUR WORK, TAKE CARE OF THINGS AT HOME, OR GET ALONG WITH OTHER PEOPLE: VERY DIFFICULT
1. LITTLE INTEREST OR PLEASURE IN DOING THINGS: SEVERAL DAYS
SUM OF ALL RESPONSES TO PHQ QUESTIONS 1-9: 9
6. FEELING BAD ABOUT YOURSELF - OR THAT YOU ARE A FAILURE OR HAVE LET YOURSELF OR YOUR FAMILY DOWN: SEVERAL DAYS
2. FEELING DOWN, DEPRESSED OR HOPELESS: SEVERAL DAYS
8. MOVING OR SPEAKING SO SLOWLY THAT OTHER PEOPLE COULD HAVE NOTICED. OR THE OPPOSITE, BEING SO FIGETY OR RESTLESS THAT YOU HAVE BEEN MOVING AROUND A LOT MORE THAN USUAL: NOT AT ALL
4. FEELING TIRED OR HAVING LITTLE ENERGY: NEARLY EVERY DAY
SUM OF ALL RESPONSES TO PHQ QUESTIONS 1-9: 9
5. POOR APPETITE OR OVEREATING: SEVERAL DAYS

## 2024-04-29 ASSESSMENT — ANXIETY QUESTIONNAIRES
1. FEELING NERVOUS, ANXIOUS, OR ON EDGE: SEVERAL DAYS
IF YOU CHECKED OFF ANY PROBLEMS ON THIS QUESTIONNAIRE, HOW DIFFICULT HAVE THESE PROBLEMS MADE IT FOR YOU TO DO YOUR WORK, TAKE CARE OF THINGS AT HOME, OR GET ALONG WITH OTHER PEOPLE: SOMEWHAT DIFFICULT
7. FEELING AFRAID AS IF SOMETHING AWFUL MIGHT HAPPEN: NOT AT ALL
5. BEING SO RESTLESS THAT IT IS HARD TO SIT STILL: SEVERAL DAYS
6. BECOMING EASILY ANNOYED OR IRRITABLE: SEVERAL DAYS
2. NOT BEING ABLE TO STOP OR CONTROL WORRYING: SEVERAL DAYS
3. WORRYING TOO MUCH ABOUT DIFFERENT THINGS: NOT AT ALL
4. TROUBLE RELAXING: SEVERAL DAYS
GAD7 TOTAL SCORE: 5

## 2024-05-01 ASSESSMENT — ENCOUNTER SYMPTOMS
EYES NEGATIVE: 1
ALLERGIC/IMMUNOLOGIC NEGATIVE: 1
RESPIRATORY NEGATIVE: 1
GASTROINTESTINAL NEGATIVE: 1

## 2024-05-08 DIAGNOSIS — F90.2 ATTENTION DEFICIT HYPERACTIVITY DISORDER (ADHD), COMBINED TYPE: ICD-10-CM

## 2024-05-08 RX ORDER — DEXTROAMPHETAMINE SACCHARATE, AMPHETAMINE ASPARTATE MONOHYDRATE, DEXTROAMPHETAMINE SULFATE AND AMPHETAMINE SULFATE 6.25; 6.25; 6.25; 6.25 MG/1; MG/1; MG/1; MG/1
25 CAPSULE, EXTENDED RELEASE ORAL EVERY MORNING
Qty: 90 CAPSULE | Refills: 0 | Status: SHIPPED | OUTPATIENT
Start: 2024-05-08 | End: 2024-08-06

## 2024-05-08 RX ORDER — DEXTROAMPHETAMINE SACCHARATE, AMPHETAMINE ASPARTATE, DEXTROAMPHETAMINE SULFATE AND AMPHETAMINE SULFATE 2.5; 2.5; 2.5; 2.5 MG/1; MG/1; MG/1; MG/1
10 TABLET ORAL DAILY
Qty: 90 TABLET | Refills: 0 | Status: SHIPPED | OUTPATIENT
Start: 2024-05-08 | End: 2024-08-06

## 2024-05-08 RX ORDER — BUPROPION HYDROCHLORIDE 150 MG/1
150 TABLET ORAL EVERY MORNING
Qty: 90 TABLET | Refills: 3 | OUTPATIENT
Start: 2024-05-08

## 2024-05-08 NOTE — TELEPHONE ENCOUNTER
Medication:   Requested Prescriptions     Pending Prescriptions Disp Refills    amphetamine-dextroamphetamine (ADDERALL XR) 25 MG extended release capsule 90 capsule 0     Sig: Take 1 capsule by mouth every morning for 90 days. Max Daily Amount: 25 mg    amphetamine-dextroamphetamine (ADDERALL, 10MG,) 10 MG tablet 90 tablet 0     Sig: Take 1 tablet by mouth daily for 90 days. take early afternoon Max Daily Amount: 10 mg    buPROPion (WELLBUTRIN XL) 150 MG extended release tablet 90 tablet 3     Sig: Take 1 tablet by mouth every morning        Last Filled:      Patient Phone Number: [phone] (home)     Last appt: 4/12/2024   Next appt: Visit date not found    Last OARRS:        No data to display

## 2024-05-08 NOTE — PROGRESS NOTES
PSYCHIATRY PROGRESS NOTE    Odalys Orozco  1995  05/15/2024  Face to Face time: 30 minutes  PCP: Darleen Go MD    CC:   Chief Complaint   Patient presents with    Follow-up       Patient is a 29 y.o. female without significant past medical history presents the outpatient psychiatric clinic today for evaluation management of depression and anxiety.    A:  Patient's presentation today is indicative of continued difficulties with depression, however there is a question as to whether her medications are optimally working for her.  We will attempt to adjust things to provide her with further support.    Diagnosis:  ADHD combined type  MDD-R severe with PMDD  Social Anxiety Disorder    P:   1.  Recommend a trial discontinuation of bupropion to see if that is affecting her sleep.  Patient was advised to contact Dr. Lara if she feels that she needs to add this medication back in because there are desired effects that are missing.  2.  Continue current medications of Adderall XR 25 mg daily, Adderall IR 10 mg every afternoon, escitalopram 20 mg nightly    Medication Monitoring:    - PDMP reviewed: Adderall IR 10 mg daily 90-day supply last filled 2/16/2024.  Adderall ER 25 mg daily 90-day supply last filled 2/15/2024.    Follow-up: 6 weeks    Safety: Pt was counseled on the potential for increased suicidal ideations and advised on potential options for dealing with these including hotlines, calling the office, or going to the nearest emergency room.      __________________________________________________________________________    S:   Patient indicated that she was doing \"ok\" but not great at the moment.  She stated that she was right in the midst of the week before her cycle and stated that her moods as well as her sleep were still being impacted by this.  She stated that she's waking consistently around 3am despite getting to bed around 11pm.  She would ideally like to wake up around 645, but often this does

## 2024-05-15 ENCOUNTER — OFFICE VISIT (OUTPATIENT)
Dept: PSYCHIATRY | Age: 29
End: 2024-05-15
Payer: COMMERCIAL

## 2024-05-15 VITALS
SYSTOLIC BLOOD PRESSURE: 102 MMHG | WEIGHT: 119 LBS | DIASTOLIC BLOOD PRESSURE: 62 MMHG | BODY MASS INDEX: 21.9 KG/M2 | HEIGHT: 62 IN

## 2024-05-15 DIAGNOSIS — F32.81 PMDD (PREMENSTRUAL DYSPHORIC DISORDER): Primary | ICD-10-CM

## 2024-05-15 DIAGNOSIS — F90.2 ATTENTION DEFICIT HYPERACTIVITY DISORDER (ADHD), COMBINED TYPE: ICD-10-CM

## 2024-05-15 DIAGNOSIS — F33.2 MAJOR DEPRESSIVE DISORDER, RECURRENT SEVERE WITHOUT PSYCHOTIC FEATURES (HCC): ICD-10-CM

## 2024-05-15 PROCEDURE — 99214 OFFICE O/P EST MOD 30 MIN: CPT | Performed by: STUDENT IN AN ORGANIZED HEALTH CARE EDUCATION/TRAINING PROGRAM

## 2024-05-15 RX ORDER — DEXTROAMPHETAMINE SACCHARATE, AMPHETAMINE ASPARTATE MONOHYDRATE, DEXTROAMPHETAMINE SULFATE AND AMPHETAMINE SULFATE 6.25; 6.25; 6.25; 6.25 MG/1; MG/1; MG/1; MG/1
25 CAPSULE, EXTENDED RELEASE ORAL EVERY MORNING
Qty: 90 CAPSULE | Refills: 0 | Status: CANCELLED | OUTPATIENT
Start: 2024-05-15 | End: 2024-08-13

## 2024-05-15 ASSESSMENT — ANXIETY QUESTIONNAIRES
3. WORRYING TOO MUCH ABOUT DIFFERENT THINGS: NOT AT ALL
IF YOU CHECKED OFF ANY PROBLEMS ON THIS QUESTIONNAIRE, HOW DIFFICULT HAVE THESE PROBLEMS MADE IT FOR YOU TO DO YOUR WORK, TAKE CARE OF THINGS AT HOME, OR GET ALONG WITH OTHER PEOPLE: SOMEWHAT DIFFICULT
2. NOT BEING ABLE TO STOP OR CONTROL WORRYING: NOT AT ALL
1. FEELING NERVOUS, ANXIOUS, OR ON EDGE: SEVERAL DAYS
6. BECOMING EASILY ANNOYED OR IRRITABLE: MORE THAN HALF THE DAYS
5. BEING SO RESTLESS THAT IT IS HARD TO SIT STILL: SEVERAL DAYS
4. TROUBLE RELAXING: SEVERAL DAYS
7. FEELING AFRAID AS IF SOMETHING AWFUL MIGHT HAPPEN: NOT AT ALL
GAD7 TOTAL SCORE: 5

## 2024-05-15 ASSESSMENT — PATIENT HEALTH QUESTIONNAIRE - PHQ9
SUM OF ALL RESPONSES TO PHQ QUESTIONS 1-9: 11
SUM OF ALL RESPONSES TO PHQ QUESTIONS 1-9: 11
5. POOR APPETITE OR OVEREATING: SEVERAL DAYS
7. TROUBLE CONCENTRATING ON THINGS, SUCH AS READING THE NEWSPAPER OR WATCHING TELEVISION: SEVERAL DAYS
8. MOVING OR SPEAKING SO SLOWLY THAT OTHER PEOPLE COULD HAVE NOTICED. OR THE OPPOSITE, BEING SO FIGETY OR RESTLESS THAT YOU HAVE BEEN MOVING AROUND A LOT MORE THAN USUAL: NOT AT ALL
SUM OF ALL RESPONSES TO PHQ QUESTIONS 1-9: 11
3. TROUBLE FALLING OR STAYING ASLEEP: NEARLY EVERY DAY
4. FEELING TIRED OR HAVING LITTLE ENERGY: MORE THAN HALF THE DAYS
9. THOUGHTS THAT YOU WOULD BE BETTER OFF DEAD, OR OF HURTING YOURSELF: NOT AT ALL
2. FEELING DOWN, DEPRESSED OR HOPELESS: SEVERAL DAYS
6. FEELING BAD ABOUT YOURSELF - OR THAT YOU ARE A FAILURE OR HAVE LET YOURSELF OR YOUR FAMILY DOWN: SEVERAL DAYS
SUM OF ALL RESPONSES TO PHQ9 QUESTIONS 1 & 2: 3
SUM OF ALL RESPONSES TO PHQ QUESTIONS 1-9: 11
1. LITTLE INTEREST OR PLEASURE IN DOING THINGS: MORE THAN HALF THE DAYS
10. IF YOU CHECKED OFF ANY PROBLEMS, HOW DIFFICULT HAVE THESE PROBLEMS MADE IT FOR YOU TO DO YOUR WORK, TAKE CARE OF THINGS AT HOME, OR GET ALONG WITH OTHER PEOPLE: SOMEWHAT DIFFICULT

## 2024-05-30 DIAGNOSIS — F33.2 MAJOR DEPRESSIVE DISORDER, RECURRENT SEVERE WITHOUT PSYCHOTIC FEATURES (HCC): ICD-10-CM

## 2024-05-30 DIAGNOSIS — F32.81 PMDD (PREMENSTRUAL DYSPHORIC DISORDER): ICD-10-CM

## 2024-05-30 RX ORDER — ESCITALOPRAM OXALATE 20 MG/1
20 TABLET ORAL DAILY
Qty: 30 TABLET | Refills: 2 | Status: SHIPPED | OUTPATIENT
Start: 2024-05-30

## 2024-05-30 NOTE — TELEPHONE ENCOUNTER
Medication:   Requested Prescriptions     Pending Prescriptions Disp Refills    escitalopram (LEXAPRO) 20 MG tablet 30 tablet 2     Sig: Take 1 tablet by mouth daily        Last Filled:      Patient Phone Number: [phone] (home)     Last appt: 5/15/2024   Next appt: 6/20/2024    Last OARRS:        No data to display

## 2024-06-19 NOTE — PROGRESS NOTES
PSYCHIATRY PROGRESS NOTE    Odalys Orozco  1995 06/20/2024  Face to Face time: ***  PCP: Darleen Go MD    CC: No chief complaint on file.      Patient is a 29 y.o. female     A:  ***    ***    P:   ***    Medication Monitoring:    - PDMP reviewed: ***     Follow-up: ***    Safety: Pt was counseled on the potential for increased suicidal ideations and advised on potential options for dealing with these including hotlines, calling the office, or going to the nearest emergency room.      __________________________________________________________________________    S:   ***    ROS:  Review of Systems     Brief Medical Hx:   Patient Active Problem List   Diagnosis    Seasonal allergies    Otitis externa of right ear    Attention deficit hyperactivity disorder (ADHD), combined type    Major depressive disorder, recurrent severe without psychotic features (HCC)    Social anxiety disorder    Former smoker    PMDD (premenstrual dysphoric disorder)        Brief Psych Hx:      O:  Wt Readings from Last 3 Encounters:   05/15/24 54 kg (119 lb)   04/29/24 53.6 kg (118 lb 3.2 oz)   04/15/24 53.5 kg (118 lb)       There were no vitals filed for this visit.    Mental Status Exam:   Appearance:    ***  Motor: No abnormal movements, tics or mannerisms.  Eye Contact: ***  Speech:    ***  Language:   ***  Mood/Affect:  \"***\"/ ***  Thought Process:   ***  Thought Content:    ***  Hallucinations:   ***  Associations:   Intact  Attention/Concentration:   Intact to conversation  Orientation:    Alert and oriented x4  Memory:   Intact  Fund of Knowledge:    Appropriate for age and education  Insight/Judgement:   Intact/intact          5/15/2024    10:46 AM 4/29/2024     3:49 PM   PHQ-9 Questionaire   Little interest or pleasure in doing things 2 1   Feeling down, depressed, or hopeless 1 1   Trouble falling or staying asleep, or sleeping too much 3 2   Feeling tired or having little energy 2 3   Poor appetite or overeating 1 1

## 2024-06-20 ENCOUNTER — OFFICE VISIT (OUTPATIENT)
Dept: PSYCHIATRY | Age: 29
End: 2024-06-20
Payer: COMMERCIAL

## 2024-06-20 VITALS
SYSTOLIC BLOOD PRESSURE: 98 MMHG | HEIGHT: 62 IN | OXYGEN SATURATION: 98 % | HEART RATE: 89 BPM | WEIGHT: 117.8 LBS | BODY MASS INDEX: 21.68 KG/M2 | DIASTOLIC BLOOD PRESSURE: 60 MMHG

## 2024-06-20 DIAGNOSIS — F40.10 SOCIAL ANXIETY DISORDER: ICD-10-CM

## 2024-06-20 DIAGNOSIS — F33.2 MAJOR DEPRESSIVE DISORDER, RECURRENT SEVERE WITHOUT PSYCHOTIC FEATURES (HCC): ICD-10-CM

## 2024-06-20 DIAGNOSIS — F32.81 PMDD (PREMENSTRUAL DYSPHORIC DISORDER): Primary | ICD-10-CM

## 2024-06-20 DIAGNOSIS — F90.2 ATTENTION DEFICIT HYPERACTIVITY DISORDER (ADHD), COMBINED TYPE: ICD-10-CM

## 2024-06-20 PROCEDURE — 99214 OFFICE O/P EST MOD 30 MIN: CPT | Performed by: STUDENT IN AN ORGANIZED HEALTH CARE EDUCATION/TRAINING PROGRAM

## 2024-06-20 ASSESSMENT — ANXIETY QUESTIONNAIRES
7. FEELING AFRAID AS IF SOMETHING AWFUL MIGHT HAPPEN: NOT AT ALL
1. FEELING NERVOUS, ANXIOUS, OR ON EDGE: SEVERAL DAYS
5. BEING SO RESTLESS THAT IT IS HARD TO SIT STILL: SEVERAL DAYS
6. BECOMING EASILY ANNOYED OR IRRITABLE: MORE THAN HALF THE DAYS
IF YOU CHECKED OFF ANY PROBLEMS ON THIS QUESTIONNAIRE, HOW DIFFICULT HAVE THESE PROBLEMS MADE IT FOR YOU TO DO YOUR WORK, TAKE CARE OF THINGS AT HOME, OR GET ALONG WITH OTHER PEOPLE: SOMEWHAT DIFFICULT
GAD7 TOTAL SCORE: 5
2. NOT BEING ABLE TO STOP OR CONTROL WORRYING: NOT AT ALL
4. TROUBLE RELAXING: SEVERAL DAYS
3. WORRYING TOO MUCH ABOUT DIFFERENT THINGS: NOT AT ALL

## 2024-06-20 ASSESSMENT — PATIENT HEALTH QUESTIONNAIRE - PHQ9
5. POOR APPETITE OR OVEREATING: NOT AT ALL
SUM OF ALL RESPONSES TO PHQ9 QUESTIONS 1 & 2: 2
SUM OF ALL RESPONSES TO PHQ QUESTIONS 1-9: 9
8. MOVING OR SPEAKING SO SLOWLY THAT OTHER PEOPLE COULD HAVE NOTICED. OR THE OPPOSITE, BEING SO FIGETY OR RESTLESS THAT YOU HAVE BEEN MOVING AROUND A LOT MORE THAN USUAL: NOT AT ALL
1. LITTLE INTEREST OR PLEASURE IN DOING THINGS: SEVERAL DAYS
4. FEELING TIRED OR HAVING LITTLE ENERGY: SEVERAL DAYS
3. TROUBLE FALLING OR STAYING ASLEEP: MORE THAN HALF THE DAYS
SUM OF ALL RESPONSES TO PHQ QUESTIONS 1-9: 9
7. TROUBLE CONCENTRATING ON THINGS, SUCH AS READING THE NEWSPAPER OR WATCHING TELEVISION: MORE THAN HALF THE DAYS
10. IF YOU CHECKED OFF ANY PROBLEMS, HOW DIFFICULT HAVE THESE PROBLEMS MADE IT FOR YOU TO DO YOUR WORK, TAKE CARE OF THINGS AT HOME, OR GET ALONG WITH OTHER PEOPLE: SOMEWHAT DIFFICULT
2. FEELING DOWN, DEPRESSED OR HOPELESS: SEVERAL DAYS
6. FEELING BAD ABOUT YOURSELF - OR THAT YOU ARE A FAILURE OR HAVE LET YOURSELF OR YOUR FAMILY DOWN: MORE THAN HALF THE DAYS
9. THOUGHTS THAT YOU WOULD BE BETTER OFF DEAD, OR OF HURTING YOURSELF: NOT AT ALL

## 2024-06-20 NOTE — PROGRESS NOTES
PSYCHIATRY PROGRESS NOTE    Odalys Orozco  1995 06/20/2024  Face to Face time: 30 minutes  PCP: Wai Kessler MD    CC:   Chief Complaint   Patient presents with    Follow-up       Patient is a 29 y.o. female without significant past medical history presents the outpatient psychiatric clinic today for evaluation management of depression and anxiety.    A:  Patient's presentation today is indicative of a general improvement in her mental health.  She has cut the bupropion out and found that there were likely more adverse effects associated with the medication than positives.  She presents as closer to fully stable at this time on the current regimen.    Diagnosis:  ADHD combined type  MDD-R severe with PMDD  Social Anxiety Disorder    P:   Discontinue bupropion from medication list  Continue current prescriptions of Adderall XR 25mg daily, Adderall IR 10mg every afternoon to supplement, and escitalopram 20mg daily baseline with an increase to 30mg daily on the week prior and week of her menstrual cycle    Medication Monitoring:    - PDMP reviewed: Adderall IR 10mg daily 90-day supply filled 5/24/2024.  Adderall ER 25mg daily 90-day supply filled 5/13/2024.     Follow-up: None at this time secondary to writer's impending departure    Safety: Pt was counseled on the potential for increased suicidal ideations and advised on potential options for dealing with these including hotlines, calling the office, or going to the nearest emergency room.      __________________________________________________________________________    S:   Patient identified that she is doing well at this time.  She noted that the bupropion was a problem to her medication regimen, now having cut that out successfully.  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

## 2024-06-24 ENCOUNTER — OFFICE VISIT (OUTPATIENT)
Dept: PRIMARY CARE CLINIC | Age: 29
End: 2024-06-24
Payer: COMMERCIAL

## 2024-06-24 VITALS
TEMPERATURE: 98.4 F | OXYGEN SATURATION: 96 % | SYSTOLIC BLOOD PRESSURE: 128 MMHG | HEIGHT: 62 IN | HEART RATE: 97 BPM | DIASTOLIC BLOOD PRESSURE: 60 MMHG | WEIGHT: 118 LBS | BODY MASS INDEX: 21.71 KG/M2

## 2024-06-24 DIAGNOSIS — F90.2 ATTENTION DEFICIT HYPERACTIVITY DISORDER (ADHD), COMBINED TYPE: ICD-10-CM

## 2024-06-24 DIAGNOSIS — F33.2 MAJOR DEPRESSIVE DISORDER, RECURRENT SEVERE WITHOUT PSYCHOTIC FEATURES (HCC): ICD-10-CM

## 2024-06-24 DIAGNOSIS — F32.81 PMDD (PREMENSTRUAL DYSPHORIC DISORDER): ICD-10-CM

## 2024-06-24 PROCEDURE — 99214 OFFICE O/P EST MOD 30 MIN: CPT | Performed by: FAMILY MEDICINE

## 2024-06-24 RX ORDER — ESCITALOPRAM OXALATE 20 MG/1
20-30 TABLET ORAL DAILY
Qty: 45 TABLET | Refills: 5 | Status: SHIPPED | OUTPATIENT
Start: 2024-06-24

## 2024-06-24 ASSESSMENT — ENCOUNTER SYMPTOMS
RESPIRATORY NEGATIVE: 1
GASTROINTESTINAL NEGATIVE: 1

## 2024-06-24 NOTE — PROGRESS NOTES
Wai Kessler MD  [facility]   Family and Community Medicine Residency Practice   [address], [address]  [facility] [address]  Phone: [phone]  Fax: [phone]    Odalys Orozco (: 1995) is a 29 y.o. female here for evaluation of the following chief complaint(s):  New Patient (Establishing care/No issues)    Assessment/Plan:  1. Major depressive disorder, recurrent severe without psychotic features (HCC)  2. PMDD (premenstrual dysphoric disorder)  Will take over prescriptions once psychiatry has moved.  Currently doing well.  Discussed going ahead with the trial of increase dosing during PMDD sx.    - escitalopram (LEXAPRO) 20 MG tablet; Take 1-1.5 tablets by mouth daily  Dispense: 45 tablet; Refill: 5    3. Attention deficit hyperactivity disorder (ADHD), combined type  Controlled,Will take over when her next set of refills are due.        No follow-ups on file.         Subjective:  Patient here to establish care,     Has been working with a psychiatric, they will be leaving the practice soon. Has been doing taking adderall 25 mg xr in am, adderall 10 mg in afternoon.      Doing well on lexapro.  Was told she may have PMDD, they discussed going up to 30 mg for a 10-12 day period.      Works as a farmer, [employer].          Review of Systems   Constitutional: Negative.    Respiratory: Negative.     Cardiovascular: Negative.    Gastrointestinal: Negative.    Neurological: Negative.    Psychiatric/Behavioral: Negative.         Patient Active Problem List    Diagnosis Date Noted    Former smoker 2022    Major depressive disorder, recurrent severe without psychotic features (HCC) 2022    Social anxiety disorder 2022    Otitis externa of right ear 2022    PMDD (premenstrual dysphoric disorder) 04/15/2024    Attention deficit hyperactivity disorder (ADHD), combined type 2022    Seasonal allergies        Health Maintenance   Topic Date Due

## 2024-07-10 DIAGNOSIS — F32.81 PMDD (PREMENSTRUAL DYSPHORIC DISORDER): ICD-10-CM

## 2024-07-10 DIAGNOSIS — F33.2 MAJOR DEPRESSIVE DISORDER, RECURRENT SEVERE WITHOUT PSYCHOTIC FEATURES (HCC): ICD-10-CM

## 2024-07-11 RX ORDER — ESCITALOPRAM OXALATE 20 MG/1
20-30 TABLET ORAL DAILY
Qty: 45 TABLET | Refills: 5 | Status: SHIPPED | OUTPATIENT
Start: 2024-07-11

## 2024-07-11 NOTE — TELEPHONE ENCOUNTER
Refill Request       Last Seen: Last Seen Department: 6/24/2024  Last Seen by PCP: 6/24/2024    Last Written: 6/24/24 45 with 5    Next Appointment:   Future Appointments   Date Time Provider Department Center   7/18/2024 10:30 AM Austyn Lara MD [facility]   12/2/2024 11:00 AM Wai Kessler MD [facility]       Requested Prescriptions     Pending Prescriptions Disp Refills    escitalopram (LEXAPRO) 20 MG tablet 45 tablet 5     Sig: Take 1-1.5 tablets by mouth daily

## 2024-07-12 NOTE — PROGRESS NOTES
Total Score 6 9   If you checked off any problems, how difficult have these problems made it for you to do your work, take care of things at home, or get along with other people? 1 1         7/18/2024    10:00 AM 6/20/2024     2:00 PM   DIONI-7 SCREENING   Feeling nervous, anxious, or on edge Not at all Several days   Not being able to stop or control worrying Not at all Not at all   Worrying too much about different things Not at all Not at all   Trouble relaxing Several days Several days   Being so restless that it is hard to sit still Several days Several days   Becoming easily annoyed or irritable Several days More than half the days   Feeling afraid as if something awful might happen Not at all Not at all   DIONI-7 Total Score 3 5   How difficult have these problems made it for you to do your work, take care of things at home, or get along with other people? Somewhat difficult Somewhat difficult        Labs:     Orders Only on 07/21/2023   Component Date Value Ref Range Status    Cholesterol, Total 07/21/2023 177  0 - 199 mg/dL Final    Triglycerides 07/21/2023 61  0 - 150 mg/dL Final    HDL 07/21/2023 70 (H)  40 - 60 mg/dL Final    Comment: An HDL cholesterol less than 40 mg/dL is low and  constitutes a coronary heart disease risk factor.  An HDL cholesterol greater than 60 mg/dL is a  negative risk factor for coronary heart disease.      LDL Calculated 07/21/2023 95  <100 mg/dL Final    VLDL Cholesterol Calculated 07/21/2023 12  Not Established mg/dL Final    Hep C Ab Interp 07/21/2023 Non-reactive  Non-reactive Final       EKG: None on file         Austyn Lara MD  Psychiatrist

## 2024-07-17 ASSESSMENT — ENCOUNTER SYMPTOMS
RESPIRATORY NEGATIVE: 1
EYES NEGATIVE: 1
GASTROINTESTINAL NEGATIVE: 1
ALLERGIC/IMMUNOLOGIC NEGATIVE: 1

## 2024-07-18 ENCOUNTER — OFFICE VISIT (OUTPATIENT)
Dept: PSYCHIATRY | Age: 29
End: 2024-07-18
Payer: COMMERCIAL

## 2024-07-18 VITALS
HEART RATE: 100 BPM | DIASTOLIC BLOOD PRESSURE: 62 MMHG | HEIGHT: 62 IN | BODY MASS INDEX: 21.64 KG/M2 | WEIGHT: 117.6 LBS | SYSTOLIC BLOOD PRESSURE: 98 MMHG

## 2024-07-18 DIAGNOSIS — F40.10 SOCIAL ANXIETY DISORDER: ICD-10-CM

## 2024-07-18 DIAGNOSIS — F90.2 ATTENTION DEFICIT HYPERACTIVITY DISORDER (ADHD), COMBINED TYPE: ICD-10-CM

## 2024-07-18 DIAGNOSIS — F32.81 PMDD (PREMENSTRUAL DYSPHORIC DISORDER): Primary | ICD-10-CM

## 2024-07-18 PROCEDURE — 99214 OFFICE O/P EST MOD 30 MIN: CPT | Performed by: STUDENT IN AN ORGANIZED HEALTH CARE EDUCATION/TRAINING PROGRAM

## 2024-07-18 ASSESSMENT — PATIENT HEALTH QUESTIONNAIRE - PHQ9
SUM OF ALL RESPONSES TO PHQ9 QUESTIONS 1 & 2: 2
7. TROUBLE CONCENTRATING ON THINGS, SUCH AS READING THE NEWSPAPER OR WATCHING TELEVISION: SEVERAL DAYS
SUM OF ALL RESPONSES TO PHQ QUESTIONS 1-9: 6
1. LITTLE INTEREST OR PLEASURE IN DOING THINGS: SEVERAL DAYS
SUM OF ALL RESPONSES TO PHQ QUESTIONS 1-9: 6
5. POOR APPETITE OR OVEREATING: NOT AT ALL
3. TROUBLE FALLING OR STAYING ASLEEP: MORE THAN HALF THE DAYS
9. THOUGHTS THAT YOU WOULD BE BETTER OFF DEAD, OR OF HURTING YOURSELF: NOT AT ALL
4. FEELING TIRED OR HAVING LITTLE ENERGY: SEVERAL DAYS
10. IF YOU CHECKED OFF ANY PROBLEMS, HOW DIFFICULT HAVE THESE PROBLEMS MADE IT FOR YOU TO DO YOUR WORK, TAKE CARE OF THINGS AT HOME, OR GET ALONG WITH OTHER PEOPLE: SOMEWHAT DIFFICULT
2. FEELING DOWN, DEPRESSED OR HOPELESS: SEVERAL DAYS
6. FEELING BAD ABOUT YOURSELF - OR THAT YOU ARE A FAILURE OR HAVE LET YOURSELF OR YOUR FAMILY DOWN: NOT AT ALL
8. MOVING OR SPEAKING SO SLOWLY THAT OTHER PEOPLE COULD HAVE NOTICED. OR THE OPPOSITE, BEING SO FIGETY OR RESTLESS THAT YOU HAVE BEEN MOVING AROUND A LOT MORE THAN USUAL: NOT AT ALL

## 2024-07-18 ASSESSMENT — ANXIETY QUESTIONNAIRES
7. FEELING AFRAID AS IF SOMETHING AWFUL MIGHT HAPPEN: NOT AT ALL
IF YOU CHECKED OFF ANY PROBLEMS ON THIS QUESTIONNAIRE, HOW DIFFICULT HAVE THESE PROBLEMS MADE IT FOR YOU TO DO YOUR WORK, TAKE CARE OF THINGS AT HOME, OR GET ALONG WITH OTHER PEOPLE: SOMEWHAT DIFFICULT
GAD7 TOTAL SCORE: 3
5. BEING SO RESTLESS THAT IT IS HARD TO SIT STILL: SEVERAL DAYS
1. FEELING NERVOUS, ANXIOUS, OR ON EDGE: NOT AT ALL
2. NOT BEING ABLE TO STOP OR CONTROL WORRYING: NOT AT ALL
6. BECOMING EASILY ANNOYED OR IRRITABLE: SEVERAL DAYS
4. TROUBLE RELAXING: SEVERAL DAYS
3. WORRYING TOO MUCH ABOUT DIFFERENT THINGS: NOT AT ALL

## 2024-07-18 ASSESSMENT — ENCOUNTER SYMPTOMS
GASTROINTESTINAL NEGATIVE: 1
ALLERGIC/IMMUNOLOGIC NEGATIVE: 1
EYES NEGATIVE: 1
RESPIRATORY NEGATIVE: 1

## 2024-07-18 NOTE — PATIENT INSTRUCTIONS
[facility] for a therapist or a psychiatrist in the future.  Allows you to filter your results based on your area code as well as your price point and insurance.

## 2024-08-06 DIAGNOSIS — F90.2 ATTENTION DEFICIT HYPERACTIVITY DISORDER (ADHD), COMBINED TYPE: ICD-10-CM

## 2024-08-06 RX ORDER — DEXTROAMPHETAMINE SACCHARATE, AMPHETAMINE ASPARTATE, DEXTROAMPHETAMINE SULFATE AND AMPHETAMINE SULFATE 2.5; 2.5; 2.5; 2.5 MG/1; MG/1; MG/1; MG/1
10 TABLET ORAL DAILY
Qty: 90 TABLET | Refills: 0 | OUTPATIENT
Start: 2024-08-06 | End: 2024-11-04

## 2024-08-06 RX ORDER — DEXTROAMPHETAMINE SACCHARATE, AMPHETAMINE ASPARTATE MONOHYDRATE, DEXTROAMPHETAMINE SULFATE AND AMPHETAMINE SULFATE 6.25; 6.25; 6.25; 6.25 MG/1; MG/1; MG/1; MG/1
25 CAPSULE, EXTENDED RELEASE ORAL EVERY MORNING
Qty: 90 CAPSULE | Refills: 0 | OUTPATIENT
Start: 2024-08-06 | End: 2024-11-04

## 2024-08-09 ENCOUNTER — TELEPHONE (OUTPATIENT)
Dept: ADMINISTRATIVE | Age: 29
End: 2024-08-09

## 2024-08-09 DIAGNOSIS — F32.81 PMDD (PREMENSTRUAL DYSPHORIC DISORDER): ICD-10-CM

## 2024-08-09 DIAGNOSIS — F90.2 ATTENTION DEFICIT HYPERACTIVITY DISORDER (ADHD), COMBINED TYPE: ICD-10-CM

## 2024-08-09 DIAGNOSIS — F33.2 MAJOR DEPRESSIVE DISORDER, RECURRENT SEVERE WITHOUT PSYCHOTIC FEATURES (HCC): ICD-10-CM

## 2024-08-09 RX ORDER — DEXTROAMPHETAMINE SACCHARATE, AMPHETAMINE ASPARTATE, DEXTROAMPHETAMINE SULFATE AND AMPHETAMINE SULFATE 2.5; 2.5; 2.5; 2.5 MG/1; MG/1; MG/1; MG/1
10 TABLET ORAL DAILY
Qty: 30 TABLET | Refills: 0 | Status: SHIPPED | OUTPATIENT
Start: 2024-09-08 | End: 2024-10-08

## 2024-08-09 RX ORDER — DEXTROAMPHETAMINE SACCHARATE, AMPHETAMINE ASPARTATE, DEXTROAMPHETAMINE SULFATE AND AMPHETAMINE SULFATE 2.5; 2.5; 2.5; 2.5 MG/1; MG/1; MG/1; MG/1
10 TABLET ORAL DAILY
Qty: 30 TABLET | Refills: 0 | Status: SHIPPED | OUTPATIENT
Start: 2024-08-09 | End: 2024-09-08

## 2024-08-09 RX ORDER — ESCITALOPRAM OXALATE 20 MG/1
20-30 TABLET ORAL DAILY
Qty: 135 TABLET | Refills: 3 | Status: SHIPPED | OUTPATIENT
Start: 2024-08-09

## 2024-08-09 RX ORDER — DEXTROAMPHETAMINE SACCHARATE, AMPHETAMINE ASPARTATE MONOHYDRATE, DEXTROAMPHETAMINE SULFATE AND AMPHETAMINE SULFATE 6.25; 6.25; 6.25; 6.25 MG/1; MG/1; MG/1; MG/1
25 CAPSULE, EXTENDED RELEASE ORAL EVERY MORNING
Qty: 30 CAPSULE | Refills: 0 | Status: SHIPPED | OUTPATIENT
Start: 2024-10-08 | End: 2024-11-07

## 2024-08-09 RX ORDER — DEXTROAMPHETAMINE SACCHARATE, AMPHETAMINE ASPARTATE, DEXTROAMPHETAMINE SULFATE AND AMPHETAMINE SULFATE 2.5; 2.5; 2.5; 2.5 MG/1; MG/1; MG/1; MG/1
10 TABLET ORAL DAILY
Qty: 90 TABLET | Refills: 0 | OUTPATIENT
Start: 2024-08-09 | End: 2024-11-07

## 2024-08-09 RX ORDER — DEXTROAMPHETAMINE SACCHARATE, AMPHETAMINE ASPARTATE MONOHYDRATE, DEXTROAMPHETAMINE SULFATE AND AMPHETAMINE SULFATE 6.25; 6.25; 6.25; 6.25 MG/1; MG/1; MG/1; MG/1
25 CAPSULE, EXTENDED RELEASE ORAL EVERY MORNING
Qty: 90 CAPSULE | Refills: 0 | OUTPATIENT
Start: 2024-08-09 | End: 2024-11-07

## 2024-08-09 RX ORDER — MONTELUKAST SODIUM 10 MG/1
10 TABLET ORAL NIGHTLY
Qty: 30 TABLET | Refills: 5 | OUTPATIENT
Start: 2024-08-09

## 2024-08-09 RX ORDER — DEXTROAMPHETAMINE SACCHARATE, AMPHETAMINE ASPARTATE MONOHYDRATE, DEXTROAMPHETAMINE SULFATE AND AMPHETAMINE SULFATE 6.25; 6.25; 6.25; 6.25 MG/1; MG/1; MG/1; MG/1
25 CAPSULE, EXTENDED RELEASE ORAL EVERY MORNING
Qty: 30 CAPSULE | Refills: 0 | Status: SHIPPED | OUTPATIENT
Start: 2024-09-08 | End: 2024-10-08

## 2024-08-09 RX ORDER — DEXTROAMPHETAMINE SACCHARATE, AMPHETAMINE ASPARTATE, DEXTROAMPHETAMINE SULFATE AND AMPHETAMINE SULFATE 2.5; 2.5; 2.5; 2.5 MG/1; MG/1; MG/1; MG/1
10 TABLET ORAL DAILY
Qty: 30 TABLET | Refills: 0 | Status: SHIPPED | OUTPATIENT
Start: 2024-10-08 | End: 2024-11-07

## 2024-08-09 RX ORDER — MONTELUKAST SODIUM 10 MG/1
10 TABLET ORAL NIGHTLY
Qty: 90 TABLET | Refills: 3 | Status: SHIPPED | OUTPATIENT
Start: 2024-08-09

## 2024-08-09 RX ORDER — DEXTROAMPHETAMINE SACCHARATE, AMPHETAMINE ASPARTATE MONOHYDRATE, DEXTROAMPHETAMINE SULFATE AND AMPHETAMINE SULFATE 6.25; 6.25; 6.25; 6.25 MG/1; MG/1; MG/1; MG/1
25 CAPSULE, EXTENDED RELEASE ORAL EVERY MORNING
Qty: 30 CAPSULE | Refills: 0 | Status: SHIPPED | OUTPATIENT
Start: 2024-08-09 | End: 2024-09-08

## 2024-08-09 NOTE — TELEPHONE ENCOUNTER
Refill Request       Last Seen: Last Seen Department: 6/24/2024  Last Seen by PCP: 6/24/2024    Last Written: 7/11/24 45 with 5    Next Appointment:   Future Appointments   Date Time Provider Department Center   12/2/2024 11:00 AM Wai Kessler MD [facility] DEP     Requested Prescriptions     Pending Prescriptions Disp Refills    escitalopram (LEXAPRO) 20 MG tablet 45 tablet 5     Sig: Take 1-1.5 tablets by mouth daily

## 2024-08-09 NOTE — TELEPHONE ENCOUNTER
Submitted PA for Escitalopram Oxalate 20MG tablets   Via CM  (Key: [id number]) STATUS: PENDING.    Follow up done daily; if no decision with in three days we will refax.  If another three days goes by with no decision will call the insurance for status.

## 2024-08-12 NOTE — TELEPHONE ENCOUNTER
The medication was DENIED; DENIAL letter uploaded to MEDIA.    Health plan approved qty limit is one tablet per day    If you want an APPEAL; please note in this encounter what new information you would like to APPEAL with.  Once complete route back to PA POOL.    If this requires a response please respond to the pool ( P [facility] MEDICATION PRE-AUTH).      Thank you please advise patient.

## 2024-08-16 NOTE — TELEPHONE ENCOUNTER
An Appeal has been faxed in for Escitalopram Oxalate 20MG tablets.  Appeals normally take 30 days to come back with a decision, but follow up will be done every 10 days.    If no response by the 30th day, then the insurance will be called to check status.    If this requires a response please respond to the pool ( P [facility] MEDICATION PRE-AUTH).      Thank you

## 2024-08-16 NOTE — TELEPHONE ENCOUNTER
I would like to appeal.  This is the regimen that she has been on for her depression and premenstrual dysphoric disorder.

## 2024-11-10 DIAGNOSIS — F90.2 ATTENTION DEFICIT HYPERACTIVITY DISORDER (ADHD), COMBINED TYPE: ICD-10-CM

## 2024-11-11 RX ORDER — DEXTROAMPHETAMINE SACCHARATE, AMPHETAMINE ASPARTATE, DEXTROAMPHETAMINE SULFATE AND AMPHETAMINE SULFATE 2.5; 2.5; 2.5; 2.5 MG/1; MG/1; MG/1; MG/1
10 TABLET ORAL DAILY
Qty: 30 TABLET | Refills: 0 | Status: SHIPPED | OUTPATIENT
Start: 2024-11-11 | End: 2024-12-11

## 2024-11-11 RX ORDER — DEXTROAMPHETAMINE SACCHARATE, AMPHETAMINE ASPARTATE MONOHYDRATE, DEXTROAMPHETAMINE SULFATE AND AMPHETAMINE SULFATE 6.25; 6.25; 6.25; 6.25 MG/1; MG/1; MG/1; MG/1
25 CAPSULE, EXTENDED RELEASE ORAL EVERY MORNING
Qty: 30 CAPSULE | Refills: 0 | Status: SHIPPED | OUTPATIENT
Start: 2024-11-11 | End: 2024-12-11

## 2024-11-11 NOTE — TELEPHONE ENCOUNTER
Refill Request     Last Seen: 6/24/2024    Last Written: 8/9/24    Next Appointment:   Future Appointments   Date Time Provider Department Center   12/2/2024 11:00 AM Wai Kessler MD [facility] DEP             Requested Prescriptions     Pending Prescriptions Disp Refills    amphetamine-dextroamphetamine (ADDERALL XR) 25 MG extended release capsule 30 capsule 0     Sig: Take 1 capsule by mouth every morning for 30 days. Max Daily Amount: 25 mg    amphetamine-dextroamphetamine (ADDERALL, 10MG,) 10 MG tablet 30 tablet 0     Sig: Take 1 tablet by mouth daily for 30 days. take early afternoon Max Daily Amount: 10 mg

## 2024-12-02 ENCOUNTER — OFFICE VISIT (OUTPATIENT)
Dept: PRIMARY CARE CLINIC | Age: 29
End: 2024-12-02
Payer: COMMERCIAL

## 2024-12-02 VITALS
WEIGHT: 124.8 LBS | OXYGEN SATURATION: 100 % | DIASTOLIC BLOOD PRESSURE: 60 MMHG | BODY MASS INDEX: 22.83 KG/M2 | HEART RATE: 86 BPM | SYSTOLIC BLOOD PRESSURE: 112 MMHG

## 2024-12-02 DIAGNOSIS — F33.2 MAJOR DEPRESSIVE DISORDER, RECURRENT SEVERE WITHOUT PSYCHOTIC FEATURES (HCC): ICD-10-CM

## 2024-12-02 DIAGNOSIS — F90.2 ATTENTION DEFICIT HYPERACTIVITY DISORDER (ADHD), COMBINED TYPE: ICD-10-CM

## 2024-12-02 PROCEDURE — 99214 OFFICE O/P EST MOD 30 MIN: CPT | Performed by: FAMILY MEDICINE

## 2024-12-02 RX ORDER — ESCITALOPRAM OXALATE 10 MG/1
10 TABLET ORAL DAILY
Qty: 90 TABLET | Refills: 1 | Status: SHIPPED | OUTPATIENT
Start: 2024-12-02

## 2024-12-02 RX ORDER — DEXTROAMPHETAMINE SACCHARATE, AMPHETAMINE ASPARTATE, DEXTROAMPHETAMINE SULFATE AND AMPHETAMINE SULFATE 2.5; 2.5; 2.5; 2.5 MG/1; MG/1; MG/1; MG/1
10 TABLET ORAL DAILY
Qty: 30 TABLET | Refills: 0 | Status: SHIPPED | OUTPATIENT
Start: 2025-01-31 | End: 2025-03-02

## 2024-12-02 RX ORDER — DEXTROAMPHETAMINE SACCHARATE, AMPHETAMINE ASPARTATE, DEXTROAMPHETAMINE SULFATE AND AMPHETAMINE SULFATE 2.5; 2.5; 2.5; 2.5 MG/1; MG/1; MG/1; MG/1
10 TABLET ORAL DAILY
Qty: 30 TABLET | Refills: 0 | Status: SHIPPED | OUTPATIENT
Start: 2025-01-01 | End: 2025-01-31

## 2024-12-02 RX ORDER — DEXTROAMPHETAMINE SACCHARATE, AMPHETAMINE ASPARTATE MONOHYDRATE, DEXTROAMPHETAMINE SULFATE AND AMPHETAMINE SULFATE 6.25; 6.25; 6.25; 6.25 MG/1; MG/1; MG/1; MG/1
25 CAPSULE, EXTENDED RELEASE ORAL EVERY MORNING
Qty: 30 CAPSULE | Refills: 0 | Status: SHIPPED | OUTPATIENT
Start: 2025-01-01 | End: 2025-01-31

## 2024-12-02 RX ORDER — DEXTROAMPHETAMINE SACCHARATE, AMPHETAMINE ASPARTATE, DEXTROAMPHETAMINE SULFATE AND AMPHETAMINE SULFATE 2.5; 2.5; 2.5; 2.5 MG/1; MG/1; MG/1; MG/1
10 TABLET ORAL DAILY
Qty: 30 TABLET | Refills: 0 | Status: SHIPPED | OUTPATIENT
Start: 2024-12-02 | End: 2025-01-01

## 2024-12-02 RX ORDER — DEXTROAMPHETAMINE SACCHARATE, AMPHETAMINE ASPARTATE MONOHYDRATE, DEXTROAMPHETAMINE SULFATE AND AMPHETAMINE SULFATE 6.25; 6.25; 6.25; 6.25 MG/1; MG/1; MG/1; MG/1
25 CAPSULE, EXTENDED RELEASE ORAL EVERY MORNING
Qty: 30 CAPSULE | Refills: 0 | Status: SHIPPED | OUTPATIENT
Start: 2024-12-02 | End: 2025-01-01

## 2024-12-02 RX ORDER — ESCITALOPRAM OXALATE 20 MG/1
20 TABLET ORAL DAILY
Qty: 90 TABLET | Refills: 1 | Status: SHIPPED | OUTPATIENT
Start: 2024-12-02

## 2024-12-02 RX ORDER — DEXTROAMPHETAMINE SACCHARATE, AMPHETAMINE ASPARTATE MONOHYDRATE, DEXTROAMPHETAMINE SULFATE AND AMPHETAMINE SULFATE 6.25; 6.25; 6.25; 6.25 MG/1; MG/1; MG/1; MG/1
25 CAPSULE, EXTENDED RELEASE ORAL EVERY MORNING
Qty: 30 CAPSULE | Refills: 0 | Status: SHIPPED | OUTPATIENT
Start: 2025-01-31 | End: 2025-03-02

## 2024-12-02 SDOH — ECONOMIC STABILITY: FOOD INSECURITY: WITHIN THE PAST 12 MONTHS, THE FOOD YOU BOUGHT JUST DIDN'T LAST AND YOU DIDN'T HAVE MONEY TO GET MORE.: NEVER TRUE

## 2024-12-02 SDOH — ECONOMIC STABILITY: FOOD INSECURITY: WITHIN THE PAST 12 MONTHS, YOU WORRIED THAT YOUR FOOD WOULD RUN OUT BEFORE YOU GOT MONEY TO BUY MORE.: NEVER TRUE

## 2024-12-02 SDOH — ECONOMIC STABILITY: INCOME INSECURITY: HOW HARD IS IT FOR YOU TO PAY FOR THE VERY BASICS LIKE FOOD, HOUSING, MEDICAL CARE, AND HEATING?: NOT HARD AT ALL

## 2024-12-02 ASSESSMENT — PATIENT HEALTH QUESTIONNAIRE - PHQ9
3. TROUBLE FALLING OR STAYING ASLEEP: MORE THAN HALF THE DAYS
SUM OF ALL RESPONSES TO PHQ9 QUESTIONS 1 & 2: 3
6. FEELING BAD ABOUT YOURSELF - OR THAT YOU ARE A FAILURE OR HAVE LET YOURSELF OR YOUR FAMILY DOWN: NOT AT ALL
SUM OF ALL RESPONSES TO PHQ QUESTIONS 1-9: 9
4. FEELING TIRED OR HAVING LITTLE ENERGY: MORE THAN HALF THE DAYS
10. IF YOU CHECKED OFF ANY PROBLEMS, HOW DIFFICULT HAVE THESE PROBLEMS MADE IT FOR YOU TO DO YOUR WORK, TAKE CARE OF THINGS AT HOME, OR GET ALONG WITH OTHER PEOPLE: VERY DIFFICULT
SUM OF ALL RESPONSES TO PHQ QUESTIONS 1-9: 9
5. POOR APPETITE OR OVEREATING: SEVERAL DAYS
SUM OF ALL RESPONSES TO PHQ QUESTIONS 1-9: 9
8. MOVING OR SPEAKING SO SLOWLY THAT OTHER PEOPLE COULD HAVE NOTICED. OR THE OPPOSITE, BEING SO FIGETY OR RESTLESS THAT YOU HAVE BEEN MOVING AROUND A LOT MORE THAN USUAL: NOT AT ALL
7. TROUBLE CONCENTRATING ON THINGS, SUCH AS READING THE NEWSPAPER OR WATCHING TELEVISION: SEVERAL DAYS
9. THOUGHTS THAT YOU WOULD BE BETTER OFF DEAD, OR OF HURTING YOURSELF: NOT AT ALL
1. LITTLE INTEREST OR PLEASURE IN DOING THINGS: MORE THAN HALF THE DAYS
SUM OF ALL RESPONSES TO PHQ QUESTIONS 1-9: 9
2. FEELING DOWN, DEPRESSED OR HOPELESS: SEVERAL DAYS

## 2024-12-02 ASSESSMENT — ENCOUNTER SYMPTOMS: RESPIRATORY NEGATIVE: 1

## 2024-12-02 NOTE — PROGRESS NOTES
Rhythm: Normal rate and regular rhythm.      Heart sounds: Normal heart sounds.   Pulmonary:      Effort: Pulmonary effort is normal.      Breath sounds: Normal breath sounds.   Neurological:      Mental Status: She is alert.   Psychiatric:         Mood and Affect: Mood normal.         Behavior: Behavior normal.         Thought Content: Thought content normal.         No visits with results within 6 Month(s) from this visit.   Latest known visit with results is:   Orders Only on 07/21/2023   Component Date Value Ref Range Status    Cholesterol, Total 07/21/2023 177  0 - 199 mg/dL Final    Triglycerides 07/21/2023 61  0 - 150 mg/dL Final    HDL 07/21/2023 70 (H)  40 - 60 mg/dL Final    LDL Calculated 07/21/2023 95  <100 mg/dL Final    VLDL Cholesterol Calculated 07/21/2023 12  Not Established mg/dL Final    Hep C Ab Interp 07/21/2023 Non-reactive  Non-reactive Final       An electronic signature was used to authenticate this note.  --Wai Kessler MD

## 2025-01-12 DIAGNOSIS — F90.2 ATTENTION DEFICIT HYPERACTIVITY DISORDER (ADHD), COMBINED TYPE: ICD-10-CM

## 2025-01-13 RX ORDER — DEXTROAMPHETAMINE SACCHARATE, AMPHETAMINE ASPARTATE, DEXTROAMPHETAMINE SULFATE AND AMPHETAMINE SULFATE 2.5; 2.5; 2.5; 2.5 MG/1; MG/1; MG/1; MG/1
10 TABLET ORAL DAILY
Qty: 30 TABLET | Refills: 0 | Status: SHIPPED | OUTPATIENT
Start: 2025-01-13 | End: 2025-02-12

## 2025-01-13 RX ORDER — DEXTROAMPHETAMINE SACCHARATE, AMPHETAMINE ASPARTATE MONOHYDRATE, DEXTROAMPHETAMINE SULFATE AND AMPHETAMINE SULFATE 6.25; 6.25; 6.25; 6.25 MG/1; MG/1; MG/1; MG/1
25 CAPSULE, EXTENDED RELEASE ORAL EVERY MORNING
Qty: 30 CAPSULE | Refills: 0 | Status: SHIPPED | OUTPATIENT
Start: 2025-02-12 | End: 2025-03-14

## 2025-01-13 RX ORDER — DEXTROAMPHETAMINE SACCHARATE, AMPHETAMINE ASPARTATE MONOHYDRATE, DEXTROAMPHETAMINE SULFATE AND AMPHETAMINE SULFATE 6.25; 6.25; 6.25; 6.25 MG/1; MG/1; MG/1; MG/1
25 CAPSULE, EXTENDED RELEASE ORAL EVERY MORNING
Qty: 30 CAPSULE | Refills: 0 | Status: SHIPPED | OUTPATIENT
Start: 2025-01-13 | End: 2025-02-12

## 2025-01-13 RX ORDER — DEXTROAMPHETAMINE SACCHARATE, AMPHETAMINE ASPARTATE MONOHYDRATE, DEXTROAMPHETAMINE SULFATE AND AMPHETAMINE SULFATE 6.25; 6.25; 6.25; 6.25 MG/1; MG/1; MG/1; MG/1
25 CAPSULE, EXTENDED RELEASE ORAL EVERY MORNING
Qty: 30 CAPSULE | Refills: 0 | Status: SHIPPED | OUTPATIENT
Start: 2025-03-14 | End: 2025-04-13

## 2025-01-13 RX ORDER — DEXTROAMPHETAMINE SACCHARATE, AMPHETAMINE ASPARTATE, DEXTROAMPHETAMINE SULFATE AND AMPHETAMINE SULFATE 2.5; 2.5; 2.5; 2.5 MG/1; MG/1; MG/1; MG/1
10 TABLET ORAL DAILY
Qty: 30 TABLET | Refills: 0 | Status: SHIPPED | OUTPATIENT
Start: 2025-02-12 | End: 2025-03-14

## 2025-01-13 RX ORDER — DEXTROAMPHETAMINE SACCHARATE, AMPHETAMINE ASPARTATE, DEXTROAMPHETAMINE SULFATE AND AMPHETAMINE SULFATE 2.5; 2.5; 2.5; 2.5 MG/1; MG/1; MG/1; MG/1
10 TABLET ORAL DAILY
Qty: 30 TABLET | Refills: 0 | Status: SHIPPED | OUTPATIENT
Start: 2025-03-14 | End: 2025-04-13

## 2025-01-13 NOTE — TELEPHONE ENCOUNTER
Refill Request     Last Seen: 12/2/2024    Last Written: 12/2/24    Next Appointment:   Future Appointments   Date Time Provider Department Center   6/2/2025  9:30 AM Wai Kessler MD [facility] DEP             Requested Prescriptions     Pending Prescriptions Disp Refills    amphetamine-dextroamphetamine (ADDERALL XR) 25 MG extended release capsule 30 capsule 0     Sig: Take 1 capsule by mouth every morning for 30 days. Max Daily Amount: 25 mg    amphetamine-dextroamphetamine (ADDERALL, 10MG,) 10 MG tablet 30 tablet 0     Sig: Take 1 tablet by mouth daily for 30 days. take early afternoon Max Daily Amount: 10 mg

## 2025-03-11 ENCOUNTER — E-VISIT (OUTPATIENT)
Dept: PRIMARY CARE CLINIC | Age: 30
End: 2025-03-11
Payer: COMMERCIAL

## 2025-03-11 DIAGNOSIS — U07.1 COVID: Primary | ICD-10-CM

## 2025-03-11 DIAGNOSIS — J06.9 UPPER RESPIRATORY TRACT INFECTION, UNSPECIFIED TYPE: ICD-10-CM

## 2025-03-11 PROCEDURE — 99423 OL DIG E/M SVC 21+ MIN: CPT | Performed by: NURSE PRACTITIONER

## 2025-03-11 ASSESSMENT — LIFESTYLE VARIABLES: SMOKING_STATUS: NO, I'VE NEVER SMOKED

## 2025-03-11 NOTE — PROGRESS NOTES
Reviewed questionnaire    Reviewed meds/allergies    Dx URI/covid    Plan Rx given for augmentin, follow up with PCP if no improvement  Patient then messages that she has a positive covid test. D/c augmentin. Symptoms x 3 weeks. Rest/fluids. Rx given for medrol dose pack. Follow up with PCP if no improvement    Time spent on visit 23 min

## 2025-03-12 RX ORDER — METHYLPREDNISOLONE 4 MG/1
TABLET ORAL
Qty: 1 KIT | Refills: 0 | Status: SHIPPED | OUTPATIENT
Start: 2025-03-12 | End: 2025-03-18

## 2025-04-24 DIAGNOSIS — F90.2 ATTENTION DEFICIT HYPERACTIVITY DISORDER (ADHD), COMBINED TYPE: ICD-10-CM

## 2025-04-25 RX ORDER — DEXTROAMPHETAMINE SACCHARATE, AMPHETAMINE ASPARTATE MONOHYDRATE, DEXTROAMPHETAMINE SULFATE AND AMPHETAMINE SULFATE 6.25; 6.25; 6.25; 6.25 MG/1; MG/1; MG/1; MG/1
25 CAPSULE, EXTENDED RELEASE ORAL EVERY MORNING
Qty: 30 CAPSULE | Refills: 0 | Status: SHIPPED | OUTPATIENT
Start: 2025-04-25 | End: 2025-06-02 | Stop reason: SDUPTHER

## 2025-04-25 RX ORDER — DEXTROAMPHETAMINE SACCHARATE, AMPHETAMINE ASPARTATE, DEXTROAMPHETAMINE SULFATE AND AMPHETAMINE SULFATE 2.5; 2.5; 2.5; 2.5 MG/1; MG/1; MG/1; MG/1
10 TABLET ORAL DAILY
Qty: 30 TABLET | Refills: 0 | Status: SHIPPED | OUTPATIENT
Start: 2025-04-25 | End: 2025-06-02 | Stop reason: SDUPTHER

## 2025-04-25 RX ORDER — DEXTROAMPHETAMINE SACCHARATE, AMPHETAMINE ASPARTATE, DEXTROAMPHETAMINE SULFATE AND AMPHETAMINE SULFATE 2.5; 2.5; 2.5; 2.5 MG/1; MG/1; MG/1; MG/1
10 TABLET ORAL DAILY
Qty: 30 TABLET | Refills: 0 | Status: SHIPPED | OUTPATIENT
Start: 2025-05-25 | End: 2025-06-24

## 2025-04-25 RX ORDER — DEXTROAMPHETAMINE SACCHARATE, AMPHETAMINE ASPARTATE MONOHYDRATE, DEXTROAMPHETAMINE SULFATE AND AMPHETAMINE SULFATE 6.25; 6.25; 6.25; 6.25 MG/1; MG/1; MG/1; MG/1
25 CAPSULE, EXTENDED RELEASE ORAL EVERY MORNING
Qty: 30 CAPSULE | Refills: 0 | Status: SHIPPED | OUTPATIENT
Start: 2025-05-25 | End: 2025-06-24

## 2025-04-25 NOTE — TELEPHONE ENCOUNTER
Refill Request - Controlled Substance      Last Seen Department: 12/2/2024  Last Seen by PCP: 12/2/2024    Last Written:   amphetamine-dextroamphetamine (ADDERALL XR) 25 MG extended release capsule 1/13/25 30 with 0    amphetamine-dextroamphetamine (ADDERALL, 10MG,) 10 MG tablet 1/13/25 30 with 0    Last UDS: 4/12/24     Med Agreement Signed On: -    Next Appointment:   Future Appointments   Date Time Provider Department Center   6/2/2025  9:30 AM Wai Kessler MD [facility] DEP         Future appointment scheduled    Requested Prescriptions     Pending Prescriptions Disp Refills    amphetamine-dextroamphetamine (ADDERALL XR) 25 MG extended release capsule 30 capsule 0     Sig: Take 1 capsule by mouth every morning for 30 days. Max Daily Amount: 25 mg    amphetamine-dextroamphetamine (ADDERALL, 10MG,) 10 MG tablet 30 tablet 0     Sig: Take 1 tablet by mouth daily for 30 days. take early afternoon Max Daily Amount: 10 mg

## 2025-06-02 ENCOUNTER — OFFICE VISIT (OUTPATIENT)
Dept: PRIMARY CARE CLINIC | Age: 30
End: 2025-06-02
Payer: COMMERCIAL

## 2025-06-02 VITALS
SYSTOLIC BLOOD PRESSURE: 110 MMHG | HEART RATE: 85 BPM | BODY MASS INDEX: 22.86 KG/M2 | OXYGEN SATURATION: 99 % | DIASTOLIC BLOOD PRESSURE: 68 MMHG | RESPIRATION RATE: 16 BRPM | WEIGHT: 125 LBS

## 2025-06-02 DIAGNOSIS — F32.81 PMDD (PREMENSTRUAL DYSPHORIC DISORDER): ICD-10-CM

## 2025-06-02 DIAGNOSIS — J06.9 UPPER RESPIRATORY TRACT INFECTION, UNSPECIFIED TYPE: ICD-10-CM

## 2025-06-02 DIAGNOSIS — F90.2 ATTENTION DEFICIT HYPERACTIVITY DISORDER (ADHD), COMBINED TYPE: ICD-10-CM

## 2025-06-02 DIAGNOSIS — F33.2 MAJOR DEPRESSIVE DISORDER, RECURRENT SEVERE WITHOUT PSYCHOTIC FEATURES (HCC): Primary | ICD-10-CM

## 2025-06-02 PROCEDURE — 99214 OFFICE O/P EST MOD 30 MIN: CPT | Performed by: STUDENT IN AN ORGANIZED HEALTH CARE EDUCATION/TRAINING PROGRAM

## 2025-06-02 RX ORDER — DEXTROAMPHETAMINE SACCHARATE, AMPHETAMINE ASPARTATE MONOHYDRATE, DEXTROAMPHETAMINE SULFATE AND AMPHETAMINE SULFATE 6.25; 6.25; 6.25; 6.25 MG/1; MG/1; MG/1; MG/1
25 CAPSULE, EXTENDED RELEASE ORAL DAILY
Qty: 30 CAPSULE | Refills: 0 | Status: SHIPPED | OUTPATIENT
Start: 2025-07-25 | End: 2025-08-24

## 2025-06-02 RX ORDER — DEXTROAMPHETAMINE SACCHARATE, AMPHETAMINE ASPARTATE, DEXTROAMPHETAMINE SULFATE AND AMPHETAMINE SULFATE 2.5; 2.5; 2.5; 2.5 MG/1; MG/1; MG/1; MG/1
10 TABLET ORAL DAILY
Qty: 30 TABLET | Refills: 0 | Status: SHIPPED | OUTPATIENT
Start: 2025-07-25 | End: 2025-08-24

## 2025-06-02 RX ORDER — DEXTROAMPHETAMINE SACCHARATE, AMPHETAMINE ASPARTATE, DEXTROAMPHETAMINE SULFATE AND AMPHETAMINE SULFATE 2.5; 2.5; 2.5; 2.5 MG/1; MG/1; MG/1; MG/1
10 TABLET ORAL DAILY
Qty: 30 TABLET | Refills: 0 | Status: SHIPPED | OUTPATIENT
Start: 2025-06-25 | End: 2025-07-25

## 2025-06-02 RX ORDER — DEXTROAMPHETAMINE SACCHARATE, AMPHETAMINE ASPARTATE MONOHYDRATE, DEXTROAMPHETAMINE SULFATE AND AMPHETAMINE SULFATE 6.25; 6.25; 6.25; 6.25 MG/1; MG/1; MG/1; MG/1
25 CAPSULE, EXTENDED RELEASE ORAL DAILY
Qty: 30 CAPSULE | Refills: 0 | Status: SHIPPED | OUTPATIENT
Start: 2025-06-25 | End: 2025-07-25

## 2025-06-02 SDOH — ECONOMIC STABILITY: FOOD INSECURITY: WITHIN THE PAST 12 MONTHS, YOU WORRIED THAT YOUR FOOD WOULD RUN OUT BEFORE YOU GOT MONEY TO BUY MORE.: NEVER TRUE

## 2025-06-02 SDOH — ECONOMIC STABILITY: FOOD INSECURITY: WITHIN THE PAST 12 MONTHS, THE FOOD YOU BOUGHT JUST DIDN'T LAST AND YOU DIDN'T HAVE MONEY TO GET MORE.: NEVER TRUE

## 2025-06-02 ASSESSMENT — ENCOUNTER SYMPTOMS
NAUSEA: 0
COUGH: 0
CONSTIPATION: 0
BACK PAIN: 0
WHEEZING: 0
SHORTNESS OF BREATH: 0
ABDOMINAL PAIN: 0
RHINORRHEA: 1
VOMITING: 0
SORE THROAT: 0
CHEST TIGHTNESS: 0
DIARRHEA: 0

## 2025-06-02 ASSESSMENT — PATIENT HEALTH QUESTIONNAIRE - PHQ9
SUM OF ALL RESPONSES TO PHQ QUESTIONS 1-9: 7
4. FEELING TIRED OR HAVING LITTLE ENERGY: SEVERAL DAYS
2. FEELING DOWN, DEPRESSED OR HOPELESS: SEVERAL DAYS
8. MOVING OR SPEAKING SO SLOWLY THAT OTHER PEOPLE COULD HAVE NOTICED. OR THE OPPOSITE, BEING SO FIGETY OR RESTLESS THAT YOU HAVE BEEN MOVING AROUND A LOT MORE THAN USUAL: NOT AT ALL
10. IF YOU CHECKED OFF ANY PROBLEMS, HOW DIFFICULT HAVE THESE PROBLEMS MADE IT FOR YOU TO DO YOUR WORK, TAKE CARE OF THINGS AT HOME, OR GET ALONG WITH OTHER PEOPLE: SOMEWHAT DIFFICULT
SUM OF ALL RESPONSES TO PHQ QUESTIONS 1-9: 7
SUM OF ALL RESPONSES TO PHQ QUESTIONS 1-9: 7
1. LITTLE INTEREST OR PLEASURE IN DOING THINGS: SEVERAL DAYS
9. THOUGHTS THAT YOU WOULD BE BETTER OFF DEAD, OR OF HURTING YOURSELF: NOT AT ALL
6. FEELING BAD ABOUT YOURSELF - OR THAT YOU ARE A FAILURE OR HAVE LET YOURSELF OR YOUR FAMILY DOWN: SEVERAL DAYS
3. TROUBLE FALLING OR STAYING ASLEEP: SEVERAL DAYS
7. TROUBLE CONCENTRATING ON THINGS, SUCH AS READING THE NEWSPAPER OR WATCHING TELEVISION: MORE THAN HALF THE DAYS
SUM OF ALL RESPONSES TO PHQ QUESTIONS 1-9: 7
5. POOR APPETITE OR OVEREATING: NOT AT ALL

## 2025-06-02 NOTE — PATIENT INSTRUCTIONS
[facility] (accepts caresource)  Phone: [phone]    [facility] ([facility]) (multiple locations)   [phone]     [facility]   [phone]     [facility]   [phone]     [facility] ([facility])  [phone]     [facility]   [address], [address]   [phone]      Counseling Services    [facility] Professional [facility]  [address]  [place], [place] [address]  [phone]    [facility]   [address] [place], [place] [address]  [phone]      Psychiatrists    Dr. Richard Brown Dr. Neil Dubin  [address] [facility] [facility]    [address] [place], [place] [address]   [place], [place] [address]  [phone]    Dr. Kirk Ponce  [address]   [address] [place], [place] [address] [place] [address]  [phone]      Dr.Jerome SUSANA Tyson  [address] [facility]   [address]  [place], [place] [address]   [place], [place] [address]  [phone]    Dr. Paras Chapa  [address] [facility]   [address]  [place], [place] [address]   [place], [place] [address]  [phone]      Dr. Miguel Ángel Bearden  [address]   [address] [place], [place] [address]   [place], [place] [address]  [phone]    Dr. Mariann Vogel  [address] [facility]  [place], [place] [address]  [phone]    Community Mental Health Agencies    [facility]   [facility]  [address]    [address] [place], [place] [address]   [place], [place] [address] [phone]    [facility]  [address] [facility] [facility] [address] [facility]    [address] [place] [id number]    [place], [place]

## 2025-06-02 NOTE — PROGRESS NOTES
PROGRESS NOTE   [facility] Community [facility]                                  [address], [address], [facility] [address]         Phone: [phone]    Date of Service:  6/2/2025     Patient ID: Libby Orozco is a 30 y.o. female      Subjective:     CC: ADHD Follow Up    HPI  Patient is a 30-year-old female with past medical history of depression, social anxiety disorder, PMDD, ADHD, and history of tobacco use who presents in office for ADHD follow-up today.    Interval history:  Since last time patient was seen in office, she notes that she recently started to develop URI symptoms approximately 3 days ago.  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  No other recent travel noted.  She endorses sore throat which is now resolved and has continuous congestion/rhinorrhea.  She denies any fever or chills at this time.  No other ear pain appreciated.  She was supposed to increase to Lexapro 30 mg daily however states that she \"was in a good place\" and has continued on Lexapro 20 mg daily at this time.  She has also been on Adderall XR 25 mg daily in the morning and 10 mg SR in the afternoon for some time now.  She feels that her symptoms are well-controlled overall however ends up taking her morning dose earlier than usual.  She is inquiring about new therapist for CBT today as she felt like she did not \"click\" with her prior therapist.        ROS:  Review of Systems   Constitutional:  Negative for chills, diaphoresis, fatigue and fever.   HENT:  Positive for congestion and rhinorrhea. Negative for ear pain and sore throat.    Respiratory:  Negative for cough, chest tightness, shortness of breath and wheezing.    Cardiovascular:  Negative for chest pain and palpitations.   Gastrointestinal:  Negative for abdominal pain, constipation, diarrhea, nausea and vomiting.   Musculoskeletal:

## 2025-06-24 DIAGNOSIS — F33.2 MAJOR DEPRESSIVE DISORDER, RECURRENT SEVERE WITHOUT PSYCHOTIC FEATURES (HCC): ICD-10-CM

## 2025-06-24 DIAGNOSIS — F90.2 ATTENTION DEFICIT HYPERACTIVITY DISORDER (ADHD), COMBINED TYPE: ICD-10-CM

## 2025-06-25 DIAGNOSIS — F90.2 ATTENTION DEFICIT HYPERACTIVITY DISORDER (ADHD), COMBINED TYPE: ICD-10-CM

## 2025-06-25 RX ORDER — DEXTROAMPHETAMINE SACCHARATE, AMPHETAMINE ASPARTATE MONOHYDRATE, DEXTROAMPHETAMINE SULFATE AND AMPHETAMINE SULFATE 6.25; 6.25; 6.25; 6.25 MG/1; MG/1; MG/1; MG/1
25 CAPSULE, EXTENDED RELEASE ORAL EVERY MORNING
Qty: 30 CAPSULE | Refills: 0 | OUTPATIENT
Start: 2025-06-25 | End: 2025-07-25

## 2025-06-25 RX ORDER — DEXTROAMPHETAMINE SACCHARATE, AMPHETAMINE ASPARTATE MONOHYDRATE, DEXTROAMPHETAMINE SULFATE AND AMPHETAMINE SULFATE 6.25; 6.25; 6.25; 6.25 MG/1; MG/1; MG/1; MG/1
25 CAPSULE, EXTENDED RELEASE ORAL DAILY
Qty: 30 CAPSULE | Refills: 0 | OUTPATIENT
Start: 2025-06-25 | End: 2025-07-25

## 2025-06-25 RX ORDER — ESCITALOPRAM OXALATE 20 MG/1
20 TABLET ORAL DAILY
Qty: 90 TABLET | Refills: 1 | Status: SHIPPED | OUTPATIENT
Start: 2025-06-25

## 2025-06-25 RX ORDER — DEXTROAMPHETAMINE SACCHARATE, AMPHETAMINE ASPARTATE, DEXTROAMPHETAMINE SULFATE AND AMPHETAMINE SULFATE 2.5; 2.5; 2.5; 2.5 MG/1; MG/1; MG/1; MG/1
10 TABLET ORAL DAILY
Qty: 30 TABLET | Refills: 0 | OUTPATIENT
Start: 2025-06-25 | End: 2025-07-25

## 2025-06-25 NOTE — TELEPHONE ENCOUNTER
Refill Request - Controlled Substance      Last Seen Department: 6/2/2025  Last Seen by PCP: 12/2/2024    Last Written:   escitalopram (LEXAPRO) 20 MG tablet 12/2/24 90 with 1    amphetamine-dextroamphetamine (ADDERALL XR) 25 MG extended release capsule 5/25/25 30 with 0    amphetamine-dextroamphetamine (ADDERALL, 10MG,) 10 MG tablet 5/25/25 30 with 0        Last UDS: 4/12/24    Med Agreement Signed On: -    Next Appointment:   Future Appointments   Date Time Provider Department Center   12/22/2025 10:00 AM Wai Kessler MD [facility]         Future appointment scheduled    Requested Prescriptions     Pending Prescriptions Disp Refills    escitalopram (LEXAPRO) 20 MG tablet 90 tablet 1     Sig: Take 1 tablet by mouth daily Take with 10 mg tablet.    amphetamine-dextroamphetamine (ADDERALL XR) 25 MG extended release capsule 30 capsule 0     Sig: Take 1 capsule by mouth every morning for 30 days. Max Daily Amount: 25 mg    amphetamine-dextroamphetamine (ADDERALL, 10MG,) 10 MG tablet 30 tablet 0     Sig: Take 1 tablet by mouth daily for 30 days. take early afternoon Max Daily Amount: 10 mg

## 2025-06-25 NOTE — TELEPHONE ENCOUNTER
-Will refill Lexapro  -Dr. Jesus sent a second Adderall prescription to be filled in July  -Will need updated urine drug screen and controlled substance agreement

## 2025-08-25 DIAGNOSIS — F90.2 ATTENTION DEFICIT HYPERACTIVITY DISORDER (ADHD), COMBINED TYPE: ICD-10-CM

## 2025-08-26 RX ORDER — DEXTROAMPHETAMINE SACCHARATE, AMPHETAMINE ASPARTATE, DEXTROAMPHETAMINE SULFATE AND AMPHETAMINE SULFATE 2.5; 2.5; 2.5; 2.5 MG/1; MG/1; MG/1; MG/1
10 TABLET ORAL DAILY
Qty: 30 TABLET | Refills: 0 | Status: SHIPPED | OUTPATIENT
Start: 2025-08-26 | End: 2025-09-25

## 2025-08-26 RX ORDER — DEXTROAMPHETAMINE SACCHARATE, AMPHETAMINE ASPARTATE MONOHYDRATE, DEXTROAMPHETAMINE SULFATE AND AMPHETAMINE SULFATE 6.25; 6.25; 6.25; 6.25 MG/1; MG/1; MG/1; MG/1
25 CAPSULE, EXTENDED RELEASE ORAL EVERY MORNING
Qty: 30 CAPSULE | Refills: 0 | Status: SHIPPED | OUTPATIENT
Start: 2025-08-26 | End: 2025-09-25